# Patient Record
Sex: FEMALE | Race: WHITE | Employment: OTHER | URBAN - METROPOLITAN AREA
[De-identification: names, ages, dates, MRNs, and addresses within clinical notes are randomized per-mention and may not be internally consistent; named-entity substitution may affect disease eponyms.]

---

## 2020-03-05 LAB
BASOPHILS ABSOLUTE: ABNORMAL
BASOPHILS ABSOLUTE: ABNORMAL
BASOPHILS RELATIVE PERCENT: ABNORMAL
BASOPHILS RELATIVE PERCENT: ABNORMAL
EOSINOPHILS ABSOLUTE: ABNORMAL
EOSINOPHILS ABSOLUTE: ABNORMAL
EOSINOPHILS RELATIVE PERCENT: ABNORMAL
EOSINOPHILS RELATIVE PERCENT: ABNORMAL
HCT VFR BLD CALC: 28.6 % (ref 36–46)
HCT VFR BLD CALC: 28.6 % (ref 36–46)
HEMOGLOBIN: 8.4 G/DL (ref 12–16)
HEMOGLOBIN: 8.4 G/DL (ref 12–16)
LYMPHOCYTES ABSOLUTE: ABNORMAL
LYMPHOCYTES ABSOLUTE: ABNORMAL
LYMPHOCYTES RELATIVE PERCENT: ABNORMAL
LYMPHOCYTES RELATIVE PERCENT: ABNORMAL
MCH RBC QN AUTO: ABNORMAL PG
MCH RBC QN AUTO: ABNORMAL PG
MCHC RBC AUTO-ENTMCNC: 29.4 G/DL
MCHC RBC AUTO-ENTMCNC: 29.4 G/DL
MCV RBC AUTO: 78 FL
MCV RBC AUTO: 78 FL
MONOCYTES ABSOLUTE: ABNORMAL
MONOCYTES ABSOLUTE: ABNORMAL
MONOCYTES RELATIVE PERCENT: ABNORMAL
MONOCYTES RELATIVE PERCENT: ABNORMAL
NEUTROPHILS ABSOLUTE: ABNORMAL
NEUTROPHILS ABSOLUTE: ABNORMAL
NEUTROPHILS RELATIVE PERCENT: ABNORMAL
NEUTROPHILS RELATIVE PERCENT: ABNORMAL
PLATELET # BLD: 357 K/ΜL
PLATELET # BLD: 357 K/ΜL
PMV BLD AUTO: ABNORMAL FL
PMV BLD AUTO: ABNORMAL FL
RBC # BLD: 3.65 10^6/ΜL
RBC # BLD: ABNORMAL 10*6/UL
WBC # BLD: 6.1 10^3/ML
WBC # BLD: 8.1 10^3/ML

## 2020-08-03 LAB
ALBUMIN SERPL-MCNC: 3.5 G/DL
ALP BLD-CCNC: 66 U/L
ALT SERPL-CCNC: 9 U/L
ANION GAP SERPL CALCULATED.3IONS-SCNC: 12 MMOL/L
AST SERPL-CCNC: 18 U/L
BILIRUB SERPL-MCNC: 0.4 MG/DL (ref 0.1–1.4)
BUN BLDV-MCNC: 10 MG/DL
CALCIUM SERPL-MCNC: 8.6 MG/DL
CHLORIDE BLD-SCNC: 102 MMOL/L
CO2: NORMAL
CREAT SERPL-MCNC: 0.37 MG/DL
FERRITIN: 10 NG/ML (ref 9–150)
GFR CALCULATED: NORMAL
GLUCOSE BLD-MCNC: 94 MG/DL
IRON: 11
POTASSIUM SERPL-SCNC: 4.3 MMOL/L
SEDIMENTATION RATE, ERYTHROCYTE: 46
SODIUM BLD-SCNC: 137 MMOL/L
TOTAL IRON BINDING CAPACITY: 379
TOTAL PROTEIN: 8.8

## 2020-09-28 ENCOUNTER — OFFICE VISIT (OUTPATIENT)
Dept: FAMILY MEDICINE CLINIC | Age: 65
End: 2020-09-28
Payer: MEDICARE

## 2020-09-28 VITALS
SYSTOLIC BLOOD PRESSURE: 90 MMHG | OXYGEN SATURATION: 96 % | HEART RATE: 74 BPM | DIASTOLIC BLOOD PRESSURE: 60 MMHG | TEMPERATURE: 98.1 F | HEIGHT: 63 IN

## 2020-09-28 PROBLEM — H04.123 DRY EYE SYNDROME, BILATERAL: Status: ACTIVE | Noted: 2020-07-23

## 2020-09-28 PROBLEM — G89.4 CHRONIC PAIN DISORDER: Status: ACTIVE | Noted: 2018-11-07

## 2020-09-28 PROBLEM — M81.0 AGE-RELATED OSTEOPOROSIS WITHOUT CURRENT PATHOLOGICAL FRACTURE: Status: ACTIVE | Noted: 2018-03-29

## 2020-09-28 PROBLEM — D64.9 ANEMIA, UNSPECIFIED: Status: ACTIVE | Noted: 2018-10-16

## 2020-09-28 PROBLEM — M05.79 RHEU ARTHRITIS W RHEU FACTOR MULT SITE W/O ORG/SYS INVOLV (HCC): Status: ACTIVE | Noted: 2018-11-07

## 2020-09-28 PROBLEM — E03.9 HYPOTHYROIDISM, UNSPECIFIED: Status: ACTIVE | Noted: 2018-10-16

## 2020-09-28 PROCEDURE — 1036F TOBACCO NON-USER: CPT | Performed by: FAMILY MEDICINE

## 2020-09-28 PROCEDURE — 1090F PRES/ABSN URINE INCON ASSESS: CPT | Performed by: FAMILY MEDICINE

## 2020-09-28 PROCEDURE — 3017F COLORECTAL CA SCREEN DOC REV: CPT | Performed by: FAMILY MEDICINE

## 2020-09-28 PROCEDURE — G8421 BMI NOT CALCULATED: HCPCS | Performed by: FAMILY MEDICINE

## 2020-09-28 PROCEDURE — 1123F ACP DISCUSS/DSCN MKR DOCD: CPT | Performed by: FAMILY MEDICINE

## 2020-09-28 PROCEDURE — 99203 OFFICE O/P NEW LOW 30 MIN: CPT | Performed by: FAMILY MEDICINE

## 2020-09-28 PROCEDURE — 4040F PNEUMOC VAC/ADMIN/RCVD: CPT | Performed by: FAMILY MEDICINE

## 2020-09-28 PROCEDURE — G8400 PT W/DXA NO RESULTS DOC: HCPCS | Performed by: FAMILY MEDICINE

## 2020-09-28 PROCEDURE — G8427 DOCREV CUR MEDS BY ELIG CLIN: HCPCS | Performed by: FAMILY MEDICINE

## 2020-09-28 RX ORDER — LIFITEGRAST 50 MG/ML
1 SOLUTION/ DROPS OPHTHALMIC 2 TIMES DAILY
COMMUNITY
Start: 2020-07-23

## 2020-09-28 RX ORDER — FOLIC ACID 1 MG/1
TABLET ORAL
COMMUNITY
Start: 2020-07-05

## 2020-09-28 RX ORDER — PREDNISONE 1 MG/1
5 TABLET ORAL DAILY
COMMUNITY

## 2020-09-28 RX ORDER — LEVOTHYROXINE SODIUM 0.1 MG/1
TABLET ORAL
COMMUNITY
Start: 2020-07-07

## 2020-09-28 RX ORDER — OXYCODONE AND ACETAMINOPHEN 7.5; 325 MG/1; MG/1
1 TABLET ORAL
COMMUNITY
Start: 2020-09-04 | End: 2021-11-16 | Stop reason: SDUPTHER

## 2020-09-28 RX ORDER — ZOLEDRONIC ACID 5 MG/100ML
5 INJECTION, SOLUTION INTRAVENOUS ONCE
COMMUNITY

## 2020-09-28 RX ORDER — NAPROXEN 500 MG/1
TABLET ORAL
Status: ON HOLD | COMMUNITY
Start: 2020-06-23 | End: 2021-10-04 | Stop reason: HOSPADM

## 2020-09-28 SDOH — HEALTH STABILITY: MENTAL HEALTH: HOW OFTEN DO YOU HAVE A DRINK CONTAINING ALCOHOL?: NEVER

## 2020-09-28 ASSESSMENT — PATIENT HEALTH QUESTIONNAIRE - PHQ9
SUM OF ALL RESPONSES TO PHQ QUESTIONS 1-9: 0
SUM OF ALL RESPONSES TO PHQ9 QUESTIONS 1 & 2: 0
1. LITTLE INTEREST OR PLEASURE IN DOING THINGS: 0
2. FEELING DOWN, DEPRESSED OR HOPELESS: 0
SUM OF ALL RESPONSES TO PHQ QUESTIONS 1-9: 0

## 2020-09-28 ASSESSMENT — ENCOUNTER SYMPTOMS
COUGH: 0
CONSTIPATION: 0
SHORTNESS OF BREATH: 0
BLOOD IN STOOL: 0
CHEST TIGHTNESS: 0
APNEA: 0
NAUSEA: 0
DIARRHEA: 0
VOMITING: 0
ABDOMINAL PAIN: 0

## 2020-09-28 NOTE — PROGRESS NOTES
Subjective:      Patient ID: Amena Petit is a 76 y.o. female who presents today for:     Chief Complaint   Patient presents with    New Patient     New patient presents to establish care, previous PCP Dr. Roger Sauer, still sees him for rheumatology.  Health Maintenance     Patient states she had a mammogram done at [facility] about 1 year ago, refuses colonoscopy. HPI  Patient is a 50-year-old female with a history of severe rheumatoid arthritis and hypothyroidism and anemia that is been in the care of rheumatology without having a PCP. She recently suffered from a bout of gastroenteritis after eating an egg sandwich at [facility] about 3 weeks ago. Both she and her  became ill with nausea, vomiting and diarrhea which lasted for approximately 2 days. Since that time patient has had weakness and fatigue and has not returned to her baseline. She initially was not able to eat or drink during the acute phase of the illness, and supplemented with Pedialyte and Gatorade which provoked improvement. she is slowly increased her diet and has been craving [employer] man turkey dinners\". At baseline she is typically able to ambulate around the house with her walker but does need assistance from her . Since this illness, she is required much more assistance and takes longer more frequent naps during the day. She has difficulty toileting and has needed assistance. She states that she has had approximately 30% improvement over the past 3 weeks and has not reached her baseline. She denies any fever, chills, abdominal pain, nausea, vomiting, diarrhea, shortness of breath, cough. Her main complaint is weakness and fatigue    Anemia: Patient receives iron infusions and is followed by rheumatology.   We do not have current access to the baseline labs      Past Medical History:   Diagnosis Date    Fibromyalgia     Fracture of pelvis ([facility]     left    History of left hip replacement     History of total left knee replacement     History of total right knee replacement     Light sensitivity     bilateral eye    Osteoporosis     Rheumatoid arthritis ([address]     Sjogrens syndrome ([address]      Past Surgical History:   Procedure Laterality Date    CATARACT REMOVAL WITH IMPLANT  01/2013    bilateral    CHOLECYSTECTOMY      OTHER SURGICAL HISTORY      bilateral eye implant    OVARY REMOVAL  06/2000    right    REVISION TOTAL HIP ARTHROPLASTY  10/2012    left    TOTAL HIP ARTHROPLASTY  04/1990    left    TOTAL KNEE ARTHROPLASTY  08/1997    right and left     History reviewed. No pertinent family history.   Social History     Socioeconomic History    Marital status:      Spouse name: Not on file    Number of children: Not on file    Years of education: Not on file    Highest education level: Not on file   Occupational History    Not on file   Social Needs    Financial resource strain: Not on file    Food insecurity     Worry: Not on file     Inability: Not on file    Transportation needs     Medical: Not on file     Non-medical: Not on file   Tobacco Use    Smoking status: Never Smoker    Smokeless tobacco: Never Used   Substance and Sexual Activity    Alcohol use: Never     Frequency: Never    Drug use: Never    Sexual activity: Not Currently   Lifestyle    Physical activity     Days per week: Not on file     Minutes per session: Not on file    Stress: Not on file   Relationships    Social connections     Talks on phone: Not on file     Gets together: Not on file     Attends Judaism service: Not on file     Active member of club or organization: Not on file     Attends meetings of clubs or organizations: Not on file     Relationship status: Not on file    Intimate partner violence     Fear of current or ex partner: Not on file     Emotionally abused: Not on file     Physically abused: Not on file     Forced sexual activity: Not on file   Other Topics Concern    Not on file   Social History Narrative    Not on file     Current Outpatient Medications on File Prior to Visit   Medication Sig Dispense Refill    etanercept (ENBREL) 50 MG/ML injection Inject into the skin once a week      folic acid (FOLVITE) 1 MG tablet TAKE 1 TABLET BY MOUTH ONCE DAILY      predniSONE (DELTASONE) 5 MG tablet Take 5 mg by mouth daily      Calcium Carbonate-Vit D-Min (CALTRATE 600+D PLUS PO) Take by mouth daily      levothyroxine (SYNTHROID) 100 MCG tablet TAKE 1 TABLET BY MOUTH ONCE DAILY      Lifitegrast (XIIDRA) 5 % SOLN Apply 1 drop to eye 2 times daily      oxyCODONE-acetaminophen (PERCOCET) 7.5-325 MG per tablet TAKE 1 2 (ONE HALF) TABLET BY MOUTH EVERY 4 HOURS AS NEEDED AND 1 AT BEDTIME AS NEEDED AS DIRECTED      naproxen (NAPROSYN) 500 MG tablet TAKE 1 TABLET BY MOUTH TWICE DAILY AS NEEDED      zoledronic acid (RECLAST) 5 MG/100ML SOLN Infuse 5 mg intravenously once      Multiple Vitamins-Minerals (CENTRUM ADULTS PO) Take by mouth      Carboxymethylcellul-Glycerin (REFRESH RELIEVA OP) Apply to eye      Pregabalin (LYRICA PO) Take 7 mLs by mouth nightly      iron sucrose (VENOFER) 20 MG/ML injection Infuse 100 mg intravenously Once in dialysis       No current facility-administered medications on file prior to visit. Allergies:  Hydrocodone-acetaminophen; Hydroxychloroquine; Indomethacin; Amoxicillin; Lactose; and Other    Review of Systems   Constitutional: Positive for appetite change and fatigue. Negative for activity change, fever and unexpected weight change. Respiratory: Negative for apnea, cough, chest tightness and shortness of breath. Cardiovascular: Negative for chest pain, palpitations and leg swelling. Gastrointestinal: Negative for abdominal pain, blood in stool, constipation, diarrhea, nausea and vomiting. Musculoskeletal: Positive for arthralgias (Chronic), back pain, gait problem and myalgias. Negative for joint swelling, neck pain and neck stiffness.    Neurological: Positive for weakness (Generalized). Negative for seizures and headaches. Psychiatric/Behavioral: Negative for hallucinations and suicidal ideas. Objective:   BP 90/60 (Site: Right Upper Arm, Position: Sitting, Cuff Size: Small Adult)   Pulse 74   Temp 98.1 °F (36.7 °C) (Temporal)   Ht 5' 3\" (1.6 m)   SpO2 96%     Physical Exam  Vitals signs and nursing note reviewed. Constitutional:       General: She is not in acute distress. Appearance: Normal appearance. She is well-developed. She is not diaphoretic. Comments: Underweight  In wheelchair   HENT:      Head: Normocephalic and atraumatic. Right Ear: Tympanic membrane, ear canal and external ear normal. There is no impacted cerumen. Left Ear: Tympanic membrane, ear canal and external ear normal. There is no impacted cerumen. Nose: Nose normal.      Mouth/Throat:      Mouth: Mucous membranes are moist.      Pharynx: Oropharynx is clear. No oropharyngeal exudate or posterior oropharyngeal erythema. Eyes:      Extraocular Movements: Extraocular movements intact. Conjunctiva/sclera: Conjunctivae normal.      Pupils: Pupils are equal, round, and reactive to light. Neck:      Musculoskeletal: Normal range of motion and neck supple. Cardiovascular:      Rate and Rhythm: Normal rate and regular rhythm. Pulses: Normal pulses. Heart sounds: Normal heart sounds. No murmur. No friction rub. No gallop. Pulmonary:      Effort: Pulmonary effort is normal. No respiratory distress. Breath sounds: Normal breath sounds. No wheezing or rales. Chest:      Chest wall: No tenderness. Abdominal:      General: Abdomen is flat. Bowel sounds are normal.      Palpations: Abdomen is soft. Tenderness: There is no abdominal tenderness. Musculoskeletal:      Comments: Contractures of bilateral hands and fingers  Inward rotated right foot/ankle contracture   Skin:     General: Skin is warm and dry.    Neurological: Mental Status: She is alert and oriented to person, place, and time. Psychiatric:         Mood and Affect: Mood normal.         Behavior: Behavior normal.         Thought Content: Thought content normal.         Judgment: Judgment normal.         Assessment & Plan:     1. Acute gastroenteritis  Resolved symptoms although still profound weakness  - CBC Auto Differential; Future  - Comprehensive Metabolic Panel; Future    2. Physical deconditioning  We will continue to monitor but patient may need formalized physical therapy and nutritional supplementation   - Comprehensive Metabolic Panel; Future    3. Hypothyroidism, unspecified type  We will check current TSH levels  - TSH with Reflex; Future      Return in about 1 month (around 10/28/2020), or if symptoms worsen or fail to improve.     Louis Sutton MD

## 2020-09-29 ENCOUNTER — HOSPITAL ENCOUNTER (OUTPATIENT)
Dept: LAB | Age: 65
Discharge: HOME | End: 2020-09-29
Payer: MEDICARE

## 2020-09-29 LAB
ALBUMIN SERPL-MCNC: 3 G/DL (ref 3.5–4.6)
ALP BLD-CCNC: 63 U/L (ref 40–130)
ALT SERPL-CCNC: 10 U/L (ref 0–33)
ANION GAP SERPL CALCULATED.3IONS-SCNC: 9 MEQ/L (ref 9–15)
AST SERPL-CCNC: 23 U/L (ref 0–35)
BASOPHILS ABSOLUTE: 0.1 K/UL (ref 0–0.2)
BASOPHILS RELATIVE PERCENT: 1.4 %
BILIRUB SERPL-MCNC: 0.3 MG/DL (ref 0.2–0.7)
BUN BLDV-MCNC: 7 MG/DL (ref 8–23)
CALCIUM SERPL-MCNC: 8.5 MG/DL (ref 8.5–9.9)
CHLORIDE BLD-SCNC: 104 MEQ/L (ref 95–107)
CO2: 25 MEQ/L (ref 20–31)
CREAT SERPL-MCNC: 0.36 MG/DL (ref 0.5–0.9)
EOSINOPHILS ABSOLUTE: 0 K/UL (ref 0–0.7)
EOSINOPHILS RELATIVE PERCENT: 0.7 %
GFR AFRICAN AMERICAN: >60
GFR NON-AFRICAN AMERICAN: >60
GLOBULIN: 3 G/DL (ref 2.3–3.5)
GLUCOSE BLD-MCNC: 101 MG/DL (ref 70–99)
HCT VFR BLD CALC: 33.8 % (ref 37–47)
HEMOGLOBIN: 10.3 G/DL (ref 12–16)
LYMPHOCYTES ABSOLUTE: 0.9 K/UL (ref 1–4.8)
LYMPHOCYTES RELATIVE PERCENT: 22.1 %
MCH RBC QN AUTO: 25.1 PG (ref 27–31.3)
MCHC RBC AUTO-ENTMCNC: 30.5 % (ref 33–37)
MCV RBC AUTO: 82.4 FL (ref 82–100)
MONOCYTES ABSOLUTE: 0.5 K/UL (ref 0.2–0.8)
MONOCYTES RELATIVE PERCENT: 12.6 %
NEUTROPHILS ABSOLUTE: 2.6 K/UL (ref 1.4–6.5)
NEUTROPHILS RELATIVE PERCENT: 63.2 %
PDW BLD-RTO: 20.4 % (ref 11.5–14.5)
PLATELET # BLD: 343 K/UL (ref 130–400)
POTASSIUM SERPL-SCNC: 4.4 MEQ/L (ref 3.4–4.9)
RBC # BLD: 4.1 M/UL (ref 4.2–5.4)
SODIUM BLD-SCNC: 138 MEQ/L (ref 135–144)
TOTAL PROTEIN: 6 G/DL (ref 6.3–8)
TSH REFLEX: 0.56 UIU/ML (ref 0.44–3.86)
WBC # BLD: 4.1 K/UL (ref 4.8–10.8)

## 2020-09-29 PROCEDURE — 36415 COLL VENOUS BLD VENIPUNCTURE: CPT

## 2020-09-29 PROCEDURE — 84443 ASSAY THYROID STIM HORMONE: CPT

## 2020-09-29 PROCEDURE — 80053 COMPREHEN METABOLIC PANEL: CPT

## 2020-09-29 PROCEDURE — 85025 COMPLETE CBC W/AUTO DIFF WBC: CPT

## 2020-10-02 ASSESSMENT — ENCOUNTER SYMPTOMS: BACK PAIN: 1

## 2020-10-13 ENCOUNTER — TELEPHONE (OUTPATIENT)
Dept: FAMILY MEDICINE CLINIC | Age: 65
End: 2020-10-13

## 2020-10-13 NOTE — TELEPHONE ENCOUNTER
Patient is calling us to give update from [address] [place] [address]. She states she has been improving, her appetite is fine but she has not had a BM in a few weeks. She denied any abdominal pain or constipation. She stated that she has had bad experiences with OTC laxatives so she does not want to go that route.

## 2020-10-15 NOTE — TELEPHONE ENCOUNTER
Spoke to patient today. She is not uncomfortable, has an appetite and eating normally. She is passing gas and denies any nausea. When asked about OTC medications, she was referencing miralax that worked too well resulting in an accident because she was not able to make it to the restroom in time due to limited mobility. I advised that she use miralax again but start with 1/4 capful and titrate upwards.  If she is not having a bowel movement in the next 48 hours we will need to obtain imaging or if any of the redflag signs above occurs she will need to go to the ER

## 2021-06-23 ENCOUNTER — TELEPHONE (OUTPATIENT)
Dept: PRIMARY CARE CLINIC | Age: 66
End: 2021-06-23

## 2021-08-25 ENCOUNTER — TELEPHONE (OUTPATIENT)
Dept: FAMILY MEDICINE CLINIC | Age: 66
End: 2021-08-25

## 2021-09-22 ENCOUNTER — HOSPITAL ENCOUNTER (OUTPATIENT)
Dept: LAB | Age: 66
Discharge: HOME | End: 2021-09-22
Payer: MEDICARE

## 2021-09-22 ENCOUNTER — OFFICE VISIT (OUTPATIENT)
Dept: FAMILY MEDICINE CLINIC | Age: 66
End: 2021-09-22
Payer: MEDICARE

## 2021-09-22 VITALS
HEIGHT: 63 IN | TEMPERATURE: 99.3 F | DIASTOLIC BLOOD PRESSURE: 60 MMHG | BODY MASS INDEX: 15.06 KG/M2 | SYSTOLIC BLOOD PRESSURE: 104 MMHG | WEIGHT: 85 LBS | OXYGEN SATURATION: 97 % | HEART RATE: 73 BPM

## 2021-09-22 DIAGNOSIS — R19.7 DIARRHEA, UNSPECIFIED TYPE: ICD-10-CM

## 2021-09-22 DIAGNOSIS — D64.9 ANEMIA, UNSPECIFIED TYPE: ICD-10-CM

## 2021-09-22 DIAGNOSIS — E03.9 HYPOTHYROIDISM, UNSPECIFIED TYPE: ICD-10-CM

## 2021-09-22 DIAGNOSIS — G89.4 CHRONIC PAIN SYNDROME: Primary | ICD-10-CM

## 2021-09-22 DIAGNOSIS — M05.79 RHEU ARTHRITIS W RHEU FACTOR MULT SITE W/O ORG/SYS INVOLV (HCC): ICD-10-CM

## 2021-09-22 LAB
AMYLASE: 50 U/L (ref 22–93)
ANISOCYTOSIS: ABNORMAL
BASOPHILS ABSOLUTE: 0 K/UL (ref 0–0.2)
BASOPHILS RELATIVE PERCENT: 0.4 %
EOSINOPHILS ABSOLUTE: 0.1 K/UL (ref 0–0.7)
EOSINOPHILS RELATIVE PERCENT: 1 %
HCT VFR BLD CALC: 33.3 % (ref 37–47)
HEMOGLOBIN: 10.1 G/DL (ref 12–16)
HYPOCHROMIA: ABNORMAL
LACTIC ACID: 1.3 MMOL/L (ref 0.5–2.2)
LIPASE: 17 U/L (ref 12–95)
LYMPHOCYTES ABSOLUTE: 0.8 K/UL (ref 1–4.8)
LYMPHOCYTES RELATIVE PERCENT: 12 %
MAMMOGRAPHY, EXTERNAL: NORMAL
MCH RBC QN AUTO: 24.7 PG (ref 27–31.3)
MCHC RBC AUTO-ENTMCNC: 30.5 % (ref 33–37)
MCV RBC AUTO: 81.1 FL (ref 82–100)
METAMYELOCYTES RELATIVE PERCENT: 1 %
MONOCYTES ABSOLUTE: 0.6 K/UL (ref 0.2–0.8)
MONOCYTES RELATIVE PERCENT: 8.5 %
NEUTROPHILS ABSOLUTE: 5.1 K/UL (ref 1.4–6.5)
NEUTROPHILS RELATIVE PERCENT: 77 %
PDW BLD-RTO: 25.7 % (ref 11.5–14.5)
PLATELET # BLD: 641 K/UL (ref 130–400)
PLATELET SLIDE REVIEW: ABNORMAL
RBC # BLD: 4.1 M/UL (ref 4.2–5.4)
T4 FREE: 1.32 NG/DL (ref 0.84–1.68)
TSH SERPL DL<=0.05 MIU/L-ACNC: 8.11 UIU/ML (ref 0.44–3.86)
WBC # BLD: 6.6 K/UL (ref 4.8–10.8)

## 2021-09-22 PROCEDURE — 80053 COMPREHEN METABOLIC PANEL: CPT

## 2021-09-22 PROCEDURE — 1036F TOBACCO NON-USER: CPT | Performed by: FAMILY MEDICINE

## 2021-09-22 PROCEDURE — 1123F ACP DISCUSS/DSCN MKR DOCD: CPT | Performed by: FAMILY MEDICINE

## 2021-09-22 PROCEDURE — 84439 ASSAY OF FREE THYROXINE: CPT

## 2021-09-22 PROCEDURE — 85025 COMPLETE CBC W/AUTO DIFF WBC: CPT

## 2021-09-22 PROCEDURE — 1090F PRES/ABSN URINE INCON ASSESS: CPT | Performed by: FAMILY MEDICINE

## 2021-09-22 PROCEDURE — 36415 COLL VENOUS BLD VENIPUNCTURE: CPT

## 2021-09-22 PROCEDURE — G8427 DOCREV CUR MEDS BY ELIG CLIN: HCPCS | Performed by: FAMILY MEDICINE

## 2021-09-22 PROCEDURE — G8400 PT W/DXA NO RESULTS DOC: HCPCS | Performed by: FAMILY MEDICINE

## 2021-09-22 PROCEDURE — 4040F PNEUMOC VAC/ADMIN/RCVD: CPT | Performed by: FAMILY MEDICINE

## 2021-09-22 PROCEDURE — 3017F COLORECTAL CA SCREEN DOC REV: CPT | Performed by: FAMILY MEDICINE

## 2021-09-22 PROCEDURE — 82150 ASSAY OF AMYLASE: CPT

## 2021-09-22 PROCEDURE — 84443 ASSAY THYROID STIM HORMONE: CPT

## 2021-09-22 PROCEDURE — G8419 CALC BMI OUT NRM PARAM NOF/U: HCPCS | Performed by: FAMILY MEDICINE

## 2021-09-22 PROCEDURE — 99214 OFFICE O/P EST MOD 30 MIN: CPT | Performed by: FAMILY MEDICINE

## 2021-09-22 PROCEDURE — 83605 ASSAY OF LACTIC ACID: CPT

## 2021-09-22 PROCEDURE — 83690 ASSAY OF LIPASE: CPT

## 2021-09-22 SDOH — ECONOMIC STABILITY: FOOD INSECURITY: WITHIN THE PAST 12 MONTHS, YOU WORRIED THAT YOUR FOOD WOULD RUN OUT BEFORE YOU GOT MONEY TO BUY MORE.: NEVER TRUE

## 2021-09-22 SDOH — ECONOMIC STABILITY: FOOD INSECURITY: WITHIN THE PAST 12 MONTHS, THE FOOD YOU BOUGHT JUST DIDN'T LAST AND YOU DIDN'T HAVE MONEY TO GET MORE.: NEVER TRUE

## 2021-09-22 ASSESSMENT — PATIENT HEALTH QUESTIONNAIRE - PHQ9
2. FEELING DOWN, DEPRESSED OR HOPELESS: 1
SUM OF ALL RESPONSES TO PHQ QUESTIONS 1-9: 2
SUM OF ALL RESPONSES TO PHQ QUESTIONS 1-9: 2
1. LITTLE INTEREST OR PLEASURE IN DOING THINGS: 1
SUM OF ALL RESPONSES TO PHQ9 QUESTIONS 1 & 2: 2
SUM OF ALL RESPONSES TO PHQ QUESTIONS 1-9: 2

## 2021-09-22 ASSESSMENT — ENCOUNTER SYMPTOMS
CHEST TIGHTNESS: 0
NAUSEA: 0
VOMITING: 0
BLOOD IN STOOL: 0
ABDOMINAL DISTENTION: 0
COUGH: 0
ABDOMINAL PAIN: 0
APNEA: 0
RECTAL PAIN: 0
DIARRHEA: 1
SHORTNESS OF BREATH: 0
CONSTIPATION: 0
ANAL BLEEDING: 0

## 2021-09-22 ASSESSMENT — SOCIAL DETERMINANTS OF HEALTH (SDOH): HOW HARD IS IT FOR YOU TO PAY FOR THE VERY BASICS LIKE FOOD, HOUSING, MEDICAL CARE, AND HEATING?: NOT HARD AT ALL

## 2021-09-22 NOTE — PROGRESS NOTES
Subjective:      Patient ID: Jack Moscoso is a 78 y.o. female who presents today for:     Chief Complaint   Patient presents with    Other     follow up, still fatigue, no diarrhea since sunday, no appetite x3.5 weeks        HPI  Patient is a very pleasant 71-year-old female with a history of severe rheumatoid arthritis who presents today to follow-up. She states that she was relatively well until approximately 3 weeks ago when she began to have diarrhea. She states that she would have 2-3 loose stools per day and had decreased appetite. She denies any fever, chills, abdominal pain, nausea or vomiting. She states due to her lack of intake, she has had significant fatigue. She has been utilizing Pedialyte to help replace her electrolytes which she states has helped with her overall energy level. Most recently during this past week, she has 3 days of diarrhea followed by 2 and half days of resolved symptoms followed by 1 day of severe diarrhea but she has not had a bowel movement in the past 3 days. She denies any fullness, cramping or pain. She has been able to tolerate a soft and clear liquid diet. She did have mashed potatoes yesterday and some cereal this morning and tolerated it well. Additionally patient states that her rheumatoid arthritis is quite severe. This is caused significant chronic disability. Patient is not able to perform her normal activities of daily living secondary to her pain. She is currently managed with Percocet and Lyrica by her rheumatologist.  She states that she has exhausted all DMARD options. She states that the degree of pain is at times intolerable. Her pain level has been affecting her mood and her motivation. She at times feels as though she is a burden to her  and she struggles to stay motivated. She does admit to increased anxiety about her overall situation in anticipation of pain. She admits to a depressed mood as well.   She is open to trying medication but does not want to consider counseling at this time. She has an excellent support system of both her  and her children. Patient also has a long history of anemia and has been receiving iron infusions coordinated by rheumatology. She states the most recent labs obtained to [facility] rheumatologist show a hemoglobin level of 8.2. Last recorded hemoglobin within our system was 10.3. She was supposed to have repeat testing, but was unable to do so. Past Medical History:   Diagnosis Date    Fibromyalgia     Fracture of pelvis ([address]     left    History of left hip replacement     History of total left knee replacement     History of total right knee replacement     Light sensitivity     bilateral eye    Osteoporosis     Rheumatoid arthritis ([facility] [address]     Sjogrens syndrome ([address]      Past Surgical History:   Procedure Laterality Date    CATARACT REMOVAL WITH IMPLANT  01/2013    bilateral    CHOLECYSTECTOMY      OTHER SURGICAL HISTORY      bilateral eye implant    OVARY REMOVAL  06/2000    right    REVISION TOTAL HIP ARTHROPLASTY  10/2012    left    TOTAL HIP ARTHROPLASTY  04/1990    left    TOTAL KNEE ARTHROPLASTY  08/1997    right and left     History reviewed. No pertinent family history.   Social History     Socioeconomic History    Marital status:      Spouse name: Not on file    Number of children: Not on file    Years of education: Not on file    Highest education level: Not on file   Occupational History    Not on file   Tobacco Use    Smoking status: Never Smoker    Smokeless tobacco: Never Used   Vaping Use    Vaping Use: Never used   Substance and Sexual Activity    Alcohol use: Never    Drug use: Never    Sexual activity: Not Currently   Other Topics Concern    Not on file   Social History Narrative    Not on file     Social Determinants of Health     Financial Resource Strain: Low Risk     Difficulty of Paying Living Expenses: Not hard at all Food Insecurity: No Food Insecurity    Worried About Running Out of Food in the Last Year: Never true    [place] of Food in the Last Year: Never true   Transportation Needs:     Lack of Transportation (Medical):      Lack of Transportation (Non-Medical):    Physical Activity:     Days of Exercise per Week:     Minutes of Exercise per Session:    Stress:     Feeling of Stress :    Social Connections:     Frequency of Communication with Friends and Family:     Frequency of Social Gatherings with Friends and Family:     Attends Mandaeism Services:     Active Member of Clubs or Organizations:     Attends Club or Organization Meetings:     Marital Status:    Intimate Partner Violence:     Fear of Current or Ex-Partner:     Emotionally Abused:     Physically Abused:     Sexually Abused:      Current Outpatient Medications on File Prior to Visit   Medication Sig Dispense Refill    folic acid (FOLVITE) 1 MG tablet TAKE 1 TABLET BY MOUTH ONCE DAILY      predniSONE (DELTASONE) 5 MG tablet Take 5 mg by mouth daily      Calcium Carbonate-Vit D-Min (CALTRATE 600+D PLUS PO) Take by mouth daily      levothyroxine (SYNTHROID) 100 MCG tablet TAKE 1 TABLET BY MOUTH ONCE DAILY      Lifitegrast (XIIDRA) 5 % SOLN Apply 1 drop to eye 2 times daily      oxyCODONE-acetaminophen (PERCOCET) 7.5-325 MG per tablet TAKE 1 2 (ONE HALF) TABLET BY MOUTH EVERY 4 HOURS AS NEEDED AND 1 AT BEDTIME AS NEEDED AS DIRECTED      naproxen (NAPROSYN) 500 MG tablet TAKE 1 TABLET BY MOUTH TWICE DAILY AS NEEDED      zoledronic acid (RECLAST) 5 MG/100ML SOLN Infuse 5 mg intravenously once      Multiple Vitamins-Minerals (CENTRUM ADULTS PO) Take by mouth      Carboxymethylcellul-Glycerin (REFRESH RELIEVA OP) Apply to eye      Pregabalin (LYRICA PO) Take 7 mLs by mouth nightly      etanercept (ENBREL) 50 MG/ML injection Inject into the skin once a week (Patient not taking: Reported on 9/22/2021)      iron sucrose (VENOFER) 20 MG/ML injection Infuse 100 mg intravenously Once in dialysis (Patient not taking: Reported on 9/22/2021)       No current facility-administered medications on file prior to visit. Allergies:  Hydrocodone-acetaminophen, Hydroxychloroquine, Indomethacin, Amoxicillin, Lactose, and Other    Review of Systems   Constitutional: Positive for activity change and appetite change. Negative for chills, diaphoresis, fatigue, fever and unexpected weight change. Respiratory: Negative for apnea, cough, chest tightness and shortness of breath. Cardiovascular: Negative for chest pain, palpitations and leg swelling. Gastrointestinal: Positive for diarrhea. Negative for abdominal distention, abdominal pain, anal bleeding, blood in stool, constipation, nausea, rectal pain and vomiting. Musculoskeletal: Negative for arthralgias. Neurological: Negative for seizures and headaches. Psychiatric/Behavioral: Negative for hallucinations and suicidal ideas. Objective:   /60   Pulse 73   Temp 99.3 °F (37.4 °C) (Infrared)   Ht 5' 3\" (1.6 m)   Wt 85 lb (38.6 kg) Comment: pt reported  SpO2 97%   BMI 15.06 kg/m²     Physical Exam  Vitals and nursing note reviewed. Constitutional:       General: She is not in acute distress. Appearance: Normal appearance. She is well-developed. She is not diaphoretic. Comments: Underweight  In wheelchair   HENT:      Head: Normocephalic and atraumatic. Right Ear: Tympanic membrane, ear canal and external ear normal. There is no impacted cerumen. Left Ear: Tympanic membrane, ear canal and external ear normal. There is no impacted cerumen. Nose: Nose normal.      Mouth/Throat:      Mouth: Mucous membranes are moist.      Pharynx: Oropharynx is clear. No oropharyngeal exudate or posterior oropharyngeal erythema. Eyes:      Extraocular Movements: Extraocular movements intact.       Conjunctiva/sclera: Conjunctivae normal.      Pupils: Pupils are equal, round, and reactive to light. Cardiovascular:      Rate and Rhythm: Normal rate and regular rhythm. Pulses: Normal pulses. Heart sounds: Normal heart sounds. No murmur heard. No friction rub. No gallop. Pulmonary:      Effort: Pulmonary effort is normal. No respiratory distress. Breath sounds: Normal breath sounds. No wheezing or rales. Chest:      Chest wall: No tenderness. Abdominal:      General: Abdomen is flat. Bowel sounds are normal. There is no distension. Palpations: Abdomen is soft. There is no mass. Tenderness: There is no abdominal tenderness. Hernia: No hernia is present. Musculoskeletal:      Cervical back: Normal range of motion and neck supple. Comments: Contractures of bilateral hands and fingers  Inward rotated right foot/ankle contracture   Skin:     General: Skin is warm and dry. Neurological:      Mental Status: She is alert and oriented to person, place, and time. Psychiatric:         Mood and Affect: Mood normal.         Behavior: Behavior normal.         Thought Content: Thought content normal.         Judgment: Judgment normal.         Assessment & Plan:     1. Chronic pain syndrome  We will refer to pain management to assist in new medication or supplementation of existing regimen    2. Rheu arthritis w rheu factor mult site w/o org/sys involv ([facility]  Continue to follow-up with rheumatology    3. Diarrhea, unspecified type  Unclear etiology at this time. We will check current lab results. As this has been a prolonged episode and recurrent believe patient would benefit from seeing gastroenterology. We will also arrange for imaging if symptoms do not improve  - Comprehensive Metabolic Panel; Future  - O&P Panel ([employer]) #1; Future  - Gastrointestinal Panel by DNA; Future  - Clostridium Difficile Toxin/Antigen; Future  - Ambulatory referral to Gastroenterology  - Lipase; Future  - Amylase; Future    4.  Anemia, unspecified type   check current results and strongly consider hematology consult  - CBC Auto Differential; Future    5. Hypothyroidism, unspecified type  We will check current thyroid levels and titrate medication accordingly  - TSH without Reflex; Future  - T4, Free; Future    6. Depression and anxiety:  Will strongly consider an SSRI or potentially Cymbalta but will await GI symptoms to resolve    Return if symptoms worsen or fail to improve.     Chris Guan MD

## 2021-09-23 DIAGNOSIS — D64.9 CHRONIC ANEMIA: ICD-10-CM

## 2021-09-23 DIAGNOSIS — D75.839 THROMBOCYTOSIS: Primary | ICD-10-CM

## 2021-09-23 LAB
ALBUMIN SERPL-MCNC: 3 G/DL (ref 3.5–4.6)
ALP BLD-CCNC: 70 U/L (ref 40–130)
ALT SERPL-CCNC: 7 U/L (ref 0–33)
ANION GAP SERPL CALCULATED.3IONS-SCNC: 19 MEQ/L (ref 9–15)
AST SERPL-CCNC: 23 U/L (ref 0–35)
BILIRUB SERPL-MCNC: <0.2 MG/DL (ref 0.2–0.7)
BUN BLDV-MCNC: 11 MG/DL (ref 8–23)
CALCIUM SERPL-MCNC: 8.7 MG/DL (ref 8.5–9.9)
CHLORIDE BLD-SCNC: 92 MEQ/L (ref 95–107)
CO2: 21 MEQ/L (ref 20–31)
CREAT SERPL-MCNC: 0.41 MG/DL (ref 0.5–0.9)
GFR AFRICAN AMERICAN: >60
GFR NON-AFRICAN AMERICAN: >60
GLOBULIN: 2.9 G/DL (ref 2.3–3.5)
GLUCOSE BLD-MCNC: 99 MG/DL (ref 70–99)
POTASSIUM SERPL-SCNC: 4.7 MEQ/L (ref 3.4–4.9)
SODIUM BLD-SCNC: 132 MEQ/L (ref 135–144)
TOTAL PROTEIN: 5.9 G/DL (ref 6.3–8)

## 2021-09-24 ENCOUNTER — TELEPHONE (OUTPATIENT)
Dept: FAMILY MEDICINE CLINIC | Age: 66
End: 2021-09-24

## 2021-09-24 NOTE — TELEPHONE ENCOUNTER
A referral was placed at the time of appointment.   Please make sure that the referral was faxed so that the patient may schedule

## 2021-09-24 NOTE — TELEPHONE ENCOUNTER
----- Message from [employer] sent at 9/24/2021  2:33 PM EDT -----  Subject: Message to Provider    QUESTIONS  Information for Provider? pt. states that they could not schedule an appt. with Dr. Elena Barnett because Dr. Nataliya Gonzalez needs to complete paper work   and fax it to Dr. Janay Tucker office. ---------------------------------------------------------------------------  --------------  Giselle Amanda INFO  What is the best way for the office to contact you? OK to leave message on   voicemail  Preferred Call Back Phone Number? [phone]  ---------------------------------------------------------------------------  --------------  SCRIPT ANSWERS  Relationship to Patient?  Self

## 2021-10-01 ENCOUNTER — APPOINTMENT (OUTPATIENT)
Dept: CT IMAGING | Age: 66
DRG: 391 | End: 2021-10-01
Payer: MEDICARE

## 2021-10-01 ENCOUNTER — TELEPHONE (OUTPATIENT)
Dept: FAMILY MEDICINE CLINIC | Age: 66
End: 2021-10-01

## 2021-10-01 ENCOUNTER — HOSPITAL ENCOUNTER (INPATIENT)
Age: 66
LOS: 3 days | Discharge: HOME | DRG: 391 | End: 2021-10-04
Attending: EMERGENCY MEDICINE | Admitting: INTERNAL MEDICINE
Payer: MEDICARE

## 2021-10-01 DIAGNOSIS — R11.2 NON-INTRACTABLE VOMITING WITH NAUSEA, UNSPECIFIED VOMITING TYPE: ICD-10-CM

## 2021-10-01 DIAGNOSIS — R19.7 DIARRHEA, UNSPECIFIED TYPE: ICD-10-CM

## 2021-10-01 DIAGNOSIS — M79.7 FIBROMYALGIA, SECONDARY: ICD-10-CM

## 2021-10-01 DIAGNOSIS — G89.29 OTHER CHRONIC PAIN: ICD-10-CM

## 2021-10-01 DIAGNOSIS — E87.20 LACTIC ACIDOSIS: ICD-10-CM

## 2021-10-01 DIAGNOSIS — M05.79 RHEU ARTHRITIS W RHEU FACTOR MULT SITE W/O ORG/SYS INVOLV (HCC): ICD-10-CM

## 2021-10-01 DIAGNOSIS — K52.9 COLITIS: Primary | ICD-10-CM

## 2021-10-01 LAB
ALBUMIN SERPL-MCNC: 3.1 G/DL (ref 3.5–4.6)
ALP BLD-CCNC: 57 U/L (ref 40–130)
ALT SERPL-CCNC: <5 U/L (ref 0–33)
ANION GAP SERPL CALCULATED.3IONS-SCNC: 17 MEQ/L (ref 9–15)
ANISOCYTOSIS: ABNORMAL
AST SERPL-CCNC: 29 U/L (ref 0–35)
BANDED NEUTROPHILS RELATIVE PERCENT: 2 % (ref 5–11)
BASOPHILS ABSOLUTE: 0.1 K/UL (ref 0–0.2)
BASOPHILS RELATIVE PERCENT: 1 %
BILIRUB SERPL-MCNC: 0.3 MG/DL (ref 0.2–0.7)
BILIRUBIN URINE: NEGATIVE
BLOOD, URINE: NEGATIVE
BUN BLDV-MCNC: 8 MG/DL (ref 8–23)
CALCIUM SERPL-MCNC: 8.2 MG/DL (ref 8.5–9.9)
CHLORIDE BLD-SCNC: 98 MEQ/L (ref 95–107)
CLARITY: CLEAR
CO2: 20 MEQ/L (ref 20–31)
COLOR: YELLOW
CREAT SERPL-MCNC: 0.33 MG/DL (ref 0.5–0.9)
EOSINOPHILS ABSOLUTE: 0 K/UL (ref 0–0.7)
EOSINOPHILS RELATIVE PERCENT: 0.3 %
GFR AFRICAN AMERICAN: >60
GFR AFRICAN AMERICAN: >60
GFR NON-AFRICAN AMERICAN: >60
GFR NON-AFRICAN AMERICAN: >60
GLOBULIN: 2.9 G/DL (ref 2.3–3.5)
GLUCOSE BLD-MCNC: 80 MG/DL (ref 70–99)
GLUCOSE URINE: NEGATIVE MG/DL
HCT VFR BLD CALC: 35.4 % (ref 37–47)
HEMOGLOBIN: 10.9 G/DL (ref 12–16)
HYPOCHROMIA: ABNORMAL
KETONES, URINE: 15 MG/DL
LACTIC ACID: 2.6 MMOL/L (ref 0.5–2.2)
LACTIC ACID: 2.7 MMOL/L (ref 0.5–2.2)
LEUKOCYTE ESTERASE, URINE: NEGATIVE
LIPASE: 16 U/L (ref 12–95)
LYMPHOCYTES ABSOLUTE: 0.6 K/UL (ref 1–4.8)
LYMPHOCYTES RELATIVE PERCENT: 4 %
MACROCYTES: ABNORMAL
MAGNESIUM: 2.1 MG/DL (ref 1.7–2.4)
MCH RBC QN AUTO: 26.1 PG (ref 27–31.3)
MCHC RBC AUTO-ENTMCNC: 30.7 % (ref 33–37)
MCV RBC AUTO: 84.8 FL (ref 82–100)
MICROCYTES: ABNORMAL
MONOCYTES ABSOLUTE: 0.7 K/UL (ref 0.2–0.8)
MONOCYTES RELATIVE PERCENT: 4.7 %
NEUTROPHILS ABSOLUTE: 13.3 K/UL (ref 1.4–6.5)
NEUTROPHILS RELATIVE PERCENT: 89 %
NITRITE, URINE: NEGATIVE
PDW BLD-RTO: 27.8 % (ref 11.5–14.5)
PERFORMED ON: ABNORMAL
PH UA: 7 (ref 5–9)
PLATELET # BLD: 432 K/UL (ref 130–400)
PLATELET SLIDE REVIEW: ADEQUATE
POC CREATININE: 0.2 MG/DL (ref 0.6–1.2)
POC SAMPLE TYPE: ABNORMAL
POIKILOCYTES: ABNORMAL
POTASSIUM SERPL-SCNC: 4.1 MEQ/L (ref 3.4–4.9)
PREALBUMIN: 14.2 MG/DL (ref 20–40)
PROTEIN UA: NEGATIVE MG/DL
RBC # BLD: 4.18 M/UL (ref 4.2–5.4)
SODIUM BLD-SCNC: 135 MEQ/L (ref 135–144)
SPECIFIC GRAVITY UA: 1.02 (ref 1–1.03)
TEAR DROP CELLS: ABNORMAL
TOTAL PROTEIN: 6 G/DL (ref 6.3–8)
UROBILINOGEN, URINE: 0.2 E.U./DL
WBC # BLD: 14.6 K/UL (ref 4.8–10.8)

## 2021-10-01 PROCEDURE — 96375 TX/PRO/DX INJ NEW DRUG ADDON: CPT

## 2021-10-01 PROCEDURE — 36415 COLL VENOUS BLD VENIPUNCTURE: CPT

## 2021-10-01 PROCEDURE — 2580000003 HC RX 258: Performed by: NURSE PRACTITIONER

## 2021-10-01 PROCEDURE — 74177 CT ABD & PELVIS W/CONTRAST: CPT

## 2021-10-01 PROCEDURE — 6360000002 HC RX W HCPCS: Performed by: EMERGENCY MEDICINE

## 2021-10-01 PROCEDURE — 83605 ASSAY OF LACTIC ACID: CPT

## 2021-10-01 PROCEDURE — 6360000002 HC RX W HCPCS: Performed by: NURSE PRACTITIONER

## 2021-10-01 PROCEDURE — 96374 THER/PROPH/DIAG INJ IV PUSH: CPT

## 2021-10-01 PROCEDURE — 83690 ASSAY OF LIPASE: CPT

## 2021-10-01 PROCEDURE — 2500000003 HC RX 250 WO HCPCS: Performed by: EMERGENCY MEDICINE

## 2021-10-01 PROCEDURE — 81003 URINALYSIS AUTO W/O SCOPE: CPT

## 2021-10-01 PROCEDURE — 83735 ASSAY OF MAGNESIUM: CPT

## 2021-10-01 PROCEDURE — 84134 ASSAY OF PREALBUMIN: CPT

## 2021-10-01 PROCEDURE — 6370000000 HC RX 637 (ALT 250 FOR IP): Performed by: EMERGENCY MEDICINE

## 2021-10-01 PROCEDURE — 2580000003 HC RX 258: Performed by: EMERGENCY MEDICINE

## 2021-10-01 PROCEDURE — C9113 INJ PANTOPRAZOLE SODIUM, VIA: HCPCS | Performed by: NURSE PRACTITIONER

## 2021-10-01 PROCEDURE — 85025 COMPLETE CBC W/AUTO DIFF WBC: CPT

## 2021-10-01 PROCEDURE — 99285 EMERGENCY DEPT VISIT HI MDM: CPT

## 2021-10-01 PROCEDURE — 96361 HYDRATE IV INFUSION ADD-ON: CPT

## 2021-10-01 PROCEDURE — 1210000000 HC MED SURG R&B

## 2021-10-01 PROCEDURE — 6360000004 HC RX CONTRAST MEDICATION: Performed by: EMERGENCY MEDICINE

## 2021-10-01 PROCEDURE — 80053 COMPREHEN METABOLIC PANEL: CPT

## 2021-10-01 RX ORDER — ONDANSETRON 2 MG/ML
4 INJECTION INTRAMUSCULAR; INTRAVENOUS ONCE
Status: COMPLETED | OUTPATIENT
Start: 2021-10-01 | End: 2021-10-01

## 2021-10-01 RX ORDER — PANTOPRAZOLE SODIUM 40 MG/10ML
40 INJECTION, POWDER, LYOPHILIZED, FOR SOLUTION INTRAVENOUS DAILY
Status: DISCONTINUED | OUTPATIENT
Start: 2021-10-01 | End: 2021-10-03

## 2021-10-01 RX ORDER — ACETAMINOPHEN 325 MG/1
650 TABLET ORAL EVERY 6 HOURS PRN
Status: DISCONTINUED | OUTPATIENT
Start: 2021-10-01 | End: 2021-10-02

## 2021-10-01 RX ORDER — SODIUM CHLORIDE 0.9 % (FLUSH) 0.9 %
10 SYRINGE (ML) INJECTION 2 TIMES DAILY
Status: DISCONTINUED | OUTPATIENT
Start: 2021-10-01 | End: 2021-10-04 | Stop reason: HOSPADM

## 2021-10-01 RX ORDER — SODIUM CHLORIDE 9 MG/ML
10 INJECTION INTRAVENOUS DAILY
Status: DISCONTINUED | OUTPATIENT
Start: 2021-10-01 | End: 2021-10-03

## 2021-10-01 RX ORDER — ACETAMINOPHEN 650 MG/1
650 SUPPOSITORY RECTAL EVERY 6 HOURS PRN
Status: DISCONTINUED | OUTPATIENT
Start: 2021-10-01 | End: 2021-10-02

## 2021-10-01 RX ORDER — METOCLOPRAMIDE HYDROCHLORIDE 5 MG/ML
10 INJECTION INTRAMUSCULAR; INTRAVENOUS ONCE
Status: COMPLETED | OUTPATIENT
Start: 2021-10-01 | End: 2021-10-01

## 2021-10-01 RX ORDER — DIPHENHYDRAMINE HYDROCHLORIDE 50 MG/ML
25 INJECTION INTRAMUSCULAR; INTRAVENOUS ONCE
Status: COMPLETED | OUTPATIENT
Start: 2021-10-01 | End: 2021-10-01

## 2021-10-01 RX ORDER — SODIUM CHLORIDE, SODIUM LACTATE, POTASSIUM CHLORIDE, CALCIUM CHLORIDE 600; 310; 30; 20 MG/100ML; MG/100ML; MG/100ML; MG/100ML
INJECTION, SOLUTION INTRAVENOUS CONTINUOUS
Status: DISCONTINUED | OUTPATIENT
Start: 2021-10-01 | End: 2021-10-03

## 2021-10-01 RX ORDER — FENTANYL CITRATE 50 UG/ML
50 INJECTION, SOLUTION INTRAMUSCULAR; INTRAVENOUS ONCE
Status: COMPLETED | OUTPATIENT
Start: 2021-10-01 | End: 2021-10-01

## 2021-10-01 RX ORDER — SODIUM CHLORIDE 0.9 % (FLUSH) 0.9 %
5-40 SYRINGE (ML) INJECTION PRN
Status: DISCONTINUED | OUTPATIENT
Start: 2021-10-01 | End: 2021-10-04 | Stop reason: HOSPADM

## 2021-10-01 RX ORDER — CIPROFLOXACIN 2 MG/ML
400 INJECTION, SOLUTION INTRAVENOUS ONCE
Status: COMPLETED | OUTPATIENT
Start: 2021-10-01 | End: 2021-10-01

## 2021-10-01 RX ORDER — ONDANSETRON 4 MG/1
4 TABLET, ORALLY DISINTEGRATING ORAL EVERY 8 HOURS PRN
Status: DISCONTINUED | OUTPATIENT
Start: 2021-10-01 | End: 2021-10-04 | Stop reason: HOSPADM

## 2021-10-01 RX ORDER — SODIUM CHLORIDE 9 MG/ML
25 INJECTION, SOLUTION INTRAVENOUS PRN
Status: DISCONTINUED | OUTPATIENT
Start: 2021-10-01 | End: 2021-10-04 | Stop reason: HOSPADM

## 2021-10-01 RX ORDER — LOPERAMIDE HYDROCHLORIDE 2 MG/1
2 CAPSULE ORAL ONCE
Status: COMPLETED | OUTPATIENT
Start: 2021-10-01 | End: 2021-10-01

## 2021-10-01 RX ORDER — POLYETHYLENE GLYCOL 3350 17 G/17G
17 POWDER, FOR SOLUTION ORAL DAILY PRN
Status: DISCONTINUED | OUTPATIENT
Start: 2021-10-01 | End: 2021-10-04 | Stop reason: HOSPADM

## 2021-10-01 RX ORDER — SODIUM CHLORIDE 0.9 % (FLUSH) 0.9 %
5-40 SYRINGE (ML) INJECTION EVERY 12 HOURS SCHEDULED
Status: DISCONTINUED | OUTPATIENT
Start: 2021-10-01 | End: 2021-10-04 | Stop reason: HOSPADM

## 2021-10-01 RX ORDER — CIPROFLOXACIN 2 MG/ML
400 INJECTION, SOLUTION INTRAVENOUS EVERY 12 HOURS
Status: DISCONTINUED | OUTPATIENT
Start: 2021-10-02 | End: 2021-10-03

## 2021-10-01 RX ORDER — ONDANSETRON 2 MG/ML
4 INJECTION INTRAMUSCULAR; INTRAVENOUS EVERY 6 HOURS PRN
Status: DISCONTINUED | OUTPATIENT
Start: 2021-10-01 | End: 2021-10-04 | Stop reason: HOSPADM

## 2021-10-01 RX ORDER — 0.9 % SODIUM CHLORIDE 0.9 %
1000 INTRAVENOUS SOLUTION INTRAVENOUS ONCE
Status: COMPLETED | OUTPATIENT
Start: 2021-10-01 | End: 2021-10-01

## 2021-10-01 RX ADMIN — LOPERAMIDE HYDROCHLORIDE 2 MG: 2 CAPSULE ORAL at 11:24

## 2021-10-01 RX ADMIN — ONDANSETRON 4 MG: 2 INJECTION INTRAMUSCULAR; INTRAVENOUS at 17:13

## 2021-10-01 RX ADMIN — CIPROFLOXACIN 400 MG: 2 INJECTION, SOLUTION INTRAVENOUS at 17:14

## 2021-10-01 RX ADMIN — FENTANYL CITRATE 50 MCG: 0.05 INJECTION, SOLUTION INTRAMUSCULAR; INTRAVENOUS at 11:19

## 2021-10-01 RX ADMIN — DIPHENHYDRAMINE HYDROCHLORIDE 25 MG: 50 INJECTION, SOLUTION INTRAMUSCULAR; INTRAVENOUS at 11:19

## 2021-10-01 RX ADMIN — HYDROMORPHONE HYDROCHLORIDE 1 MG: 1 INJECTION, SOLUTION INTRAMUSCULAR; INTRAVENOUS; SUBCUTANEOUS at 14:11

## 2021-10-01 RX ADMIN — HYDROMORPHONE HYDROCHLORIDE 1 MG: 1 INJECTION, SOLUTION INTRAMUSCULAR; INTRAVENOUS; SUBCUTANEOUS at 17:12

## 2021-10-01 RX ADMIN — SODIUM CHLORIDE 1000 ML: 9 INJECTION, SOLUTION INTRAVENOUS at 11:19

## 2021-10-01 RX ADMIN — HYDROMORPHONE HYDROCHLORIDE 0.5 MG: 1 INJECTION, SOLUTION INTRAMUSCULAR; INTRAVENOUS; SUBCUTANEOUS at 23:20

## 2021-10-01 RX ADMIN — SODIUM CHLORIDE, POTASSIUM CHLORIDE, SODIUM LACTATE AND CALCIUM CHLORIDE: 600; 310; 30; 20 INJECTION, SOLUTION INTRAVENOUS at 20:45

## 2021-10-01 RX ADMIN — METRONIDAZOLE 500 MG: 500 INJECTION, SOLUTION INTRAVENOUS at 18:25

## 2021-10-01 RX ADMIN — ONDANSETRON 4 MG: 2 INJECTION INTRAMUSCULAR; INTRAVENOUS at 23:20

## 2021-10-01 RX ADMIN — METOCLOPRAMIDE HYDROCHLORIDE 10 MG: 5 INJECTION INTRAMUSCULAR; INTRAVENOUS at 11:19

## 2021-10-01 RX ADMIN — PANTOPRAZOLE SODIUM 40 MG: 40 INJECTION, POWDER, FOR SOLUTION INTRAVENOUS at 20:45

## 2021-10-01 RX ADMIN — IOPAMIDOL 75 ML: 755 INJECTION, SOLUTION INTRAVENOUS at 13:12

## 2021-10-01 RX ADMIN — FAMOTIDINE 20 MG: 10 INJECTION, SOLUTION INTRAVENOUS at 11:18

## 2021-10-01 RX ADMIN — ONDANSETRON 4 MG: 2 INJECTION INTRAMUSCULAR; INTRAVENOUS at 14:11

## 2021-10-01 ASSESSMENT — PAIN SCALES - GENERAL
PAINLEVEL_OUTOF10: 8
PAINLEVEL_OUTOF10: 7
PAINLEVEL_OUTOF10: 8
PAINLEVEL_OUTOF10: 8
PAINLEVEL_OUTOF10: 5

## 2021-10-01 ASSESSMENT — ENCOUNTER SYMPTOMS
VOMITING: 1
COUGH: 0
ABDOMINAL PAIN: 1
ABDOMINAL DISTENTION: 0
BACK PAIN: 0
NAUSEA: 1
BLOOD IN STOOL: 0
SORE THROAT: 0
CONSTIPATION: 0
ANAL BLEEDING: 0
SHORTNESS OF BREATH: 0
DIARRHEA: 1
RESPIRATORY NEGATIVE: 1
EYES NEGATIVE: 1

## 2021-10-01 ASSESSMENT — PAIN DESCRIPTION - DESCRIPTORS
DESCRIPTORS: ACHING
DESCRIPTORS: ACHING

## 2021-10-01 ASSESSMENT — PAIN DESCRIPTION - LOCATION
LOCATION: ABDOMEN

## 2021-10-01 ASSESSMENT — PAIN DESCRIPTION - PAIN TYPE
TYPE: ACUTE PAIN

## 2021-10-01 ASSESSMENT — PAIN DESCRIPTION - FREQUENCY: FREQUENCY: CONTINUOUS

## 2021-10-01 NOTE — H&P
[facility]  MEDICINE    HISTORY AND PHYSICAL EXAM    PATIENT NAME:  Beth Rosas    MRN:  [id number]  SERVICE DATE:  10/1/2021   SERVICE TIME:  3:44 PM    Primary Care Physician: Jean Paul Dooley MD     SUBJECTIVE  CHIEF COMPLAINT:  N/V, weakness    HPI:  Beth Rosas is a 78 y.o., , female who has a past medical history of severe rheumatoid arthritis, fibromyalgia, osteoporosis and Sjogrens syndrome presented to the ED today with complaints of nausea, vomiting, weakness, diarrhea, fever and chills that have been ongoing for approximately 4 weeks now. Reports 10 days ago she presented to her PCP in which blood work was completed and was essentially unremarkable other than elevated TSH. Trula Blew She was encouraged to drink Pedialyte and increase her fluid intake. Reports her diarrhea subsided 9 days ago but however last night she began having diarrhea and emesis. Reports emesis last night was bilious and brown in color. Her PCP was contacted today instructed her to have evaluation in the ED for continued symptoms. In the ED labs and imaging were completed. She received a normal saline bolus, ciprofloxacin, Benadryl, Pepcid, fentanyl, Dilaudid, Imodium, Reglan, Flagyl, Zofran for his symptoms. CT of the abdomen and pelvis was completed. Given her medical complexities the decision made under the hospital service was made. Upon exam she is weak and frail and complains of nausea. Reports chronic pain secondary to her severe rheumatoid arthritis and  is asking for a pain management consult. Denies chest pain shortness of breath.       PAST MEDICAL HISTORY:    Past Medical History:   Diagnosis Date    Fibromyalgia     Fracture of pelvis ([address]     left    History of left hip replacement     History of total left knee replacement     History of total right knee replacement     Light sensitivity     bilateral eye    Osteoporosis     Rheumatoid arthritis (HCC)     Sjogrens syndrome [facility])      PAST SURGICAL HISTORY:    Past Surgical History:   Procedure Laterality Date    CATARACT REMOVAL WITH IMPLANT  01/2013    bilateral    CHOLECYSTECTOMY      OTHER SURGICAL HISTORY      bilateral eye implant    OVARY REMOVAL  06/2000    right    REVISION TOTAL HIP ARTHROPLASTY  10/2012    left    TOTAL HIP ARTHROPLASTY  04/1990    left    TOTAL KNEE ARTHROPLASTY  08/1997    right and left     FAMILY HISTORY:  No family history on file. SOCIAL HISTORY:    Social History     Socioeconomic History    Marital status:      Spouse name: Not on file    Number of children: Not on file    Years of education: Not on file    Highest education level: Not on file   Occupational History    Not on file   Tobacco Use    Smoking status: Never Smoker    Smokeless tobacco: Never Used   Vaping Use    Vaping Use: Never used   Substance and Sexual Activity    Alcohol use: Never    Drug use: Never    Sexual activity: Not Currently   Other Topics Concern    Not on file   Social History Narrative    Not on file     Social Determinants of Health     Financial Resource Strain: Low Risk     Difficulty of Paying Living Expenses: Not hard at all   Food Insecurity: No Food Insecurity    Worried About [address] [employer] in the Last Year: Never true    [address] in the Last Year: Never true   Transportation Needs:     Lack of Transportation (Medical):      Lack of Transportation (Non-Medical):    Physical Activity:     Days of Exercise per Week:     Minutes of Exercise per Session:    Stress:     Feeling of Stress :    Social Connections:     Frequency of Communication with Friends and Family:     Frequency of Social Gatherings with Friends and Family:     Attends Jew Services:     Active Member of Clubs or Organizations:     Attends Club or Organization Meetings:     Marital Status:    Intimate Partner Violence:     Fear of Current or Ex-Partner:     Emotionally Abused:     Physically Abused:     Sexually Abused:      MEDICATIONS:   Prior to Admission medications    Medication Sig Start Date End Date Taking? Authorizing Provider   Chlorpheniramine Maleate (CHLOR-TABLETS PO) Take 4 mg by mouth   Yes Historical Provider, MD   etanercept (ENBREL) 50 MG/ML injection Inject into the skin once a week    Yes Historical Provider, MD   folic acid (FOLVITE) 1 MG tablet TAKE 1 TABLET BY MOUTH ONCE DAILY 7/5/20  Yes Historical Provider, MD   predniSONE (DELTASONE) 5 MG tablet Take 5 mg by mouth daily   Yes Historical Provider, MD   Calcium Carbonate-Vit D-Min (CALTRATE 600+D PLUS PO) Take by mouth daily   Yes Historical Provider, MD   levothyroxine (SYNTHROID) 100 MCG tablet TAKE 1 TABLET BY MOUTH ONCE DAILY 7/7/20  Yes Historical Provider, MD   Lifitegrast Emelyn Flattery) 5 % SOLN Apply 1 drop to eye 2 times daily 7/23/20  Yes Historical Provider, MD   oxyCODONE-acetaminophen (PERCOCET) 7.5-325 MG per tablet TAKE 1 2 (ONE HALF) TABLET BY MOUTH EVERY 4 HOURS AS NEEDED AND 1 AT BEDTIME AS NEEDED AS DIRECTED 9/4/20  Yes Historical Provider, MD   naproxen (NAPROSYN) 500 MG tablet TAKE 1 TABLET BY MOUTH TWICE DAILY AS NEEDED 6/23/20  Yes Historical Provider, MD   zoledronic acid (RECLAST) 5 MG/100ML SOLN Infuse 5 mg intravenously once   Yes Historical Provider, MD   Multiple Vitamins-Minerals (CENTRUM ADULTS PO) Take by mouth   Yes Historical Provider, MD   Carboxymethylcellul-Glycerin (Cookie Mix OP) Apply to eye   Yes Historical Provider, MD   Pregabalin (LYRICA PO) Take 7 mLs by mouth nightly   Yes Historical Provider, MD   iron sucrose (VENOFER) 20 MG/ML injection Infuse 100 mg intravenously Once in dialysis    Yes Historical Provider, MD       ALLERGIES: Hydrocodone-acetaminophen, Hydroxychloroquine, Indomethacin, Amoxicillin, Lactose, and Other    REVIEW OF SYSTEM:   Review of Systems   Constitutional: Positive for activity change, appetite change, chills, fatigue and fever.  Negative for diaphoresis and unexpected weight change. HENT: Negative. Eyes: Negative. Respiratory: Negative. Cardiovascular: Negative. Gastrointestinal: Positive for abdominal pain, diarrhea, nausea and vomiting. Negative for abdominal distention, anal bleeding, blood in stool and constipation. Endocrine: Negative. Genitourinary: Negative. Musculoskeletal: Positive for joint swelling and myalgias. Skin: Positive for pallor. Neurological: Positive for weakness. Hematological: Negative. Psychiatric/Behavioral: Negative. OBJECTIVE  PHYSICAL EXAM:   Physical Exam  Constitutional:       Appearance: She is cachectic. She is ill-appearing. Comments: Appears older than stated age   HENT:      Head: Normocephalic and atraumatic. Right Ear: External ear normal.      Left Ear: External ear normal.      Mouth/Throat:      Mouth: Mucous membranes are dry. Eyes:      Conjunctiva/sclera: Conjunctivae normal.   Cardiovascular:      Rate and Rhythm: Normal rate. Pulses:           Dorsalis pedis pulses are 1+ on the right side and 1+ on the left side. Pulmonary:      Effort: Pulmonary effort is normal. No respiratory distress. Breath sounds: No stridor. No wheezing, rhonchi or rales. Abdominal:      General: Abdomen is flat. Bowel sounds are decreased. Tenderness: There is generalized abdominal tenderness. Musculoskeletal:         General: Swelling, tenderness and deformity present. Right lower le+ Edema present. Left lower le+ Edema present. Comments: Severe deformities to her joint spaces  Muscle wasting   Skin:     General: Skin is cool and dry. Capillary Refill: Capillary refill takes 2 to 3 seconds. Coloration: Skin is pale. Comments: Dressing. No open area   Neurological:      General: No focal deficit present. Mental Status: She is easily aroused. Motor: Weakness present.       Comments: Easily arousable, alert and follows commands   Psychiatric:         Mood and Affect: Affect is flat. /71   Pulse 66   Temp 98.5 °F (36.9 °C) (Oral)   Resp 16   Ht 5' 3\" (1.6 m)   Wt 80 lb (36.3 kg)   SpO2 97%   BMI 14.17 kg/m²     DATA:     Diagnostic tests reviewed for today's visit:    Most recent labs and imaging results reviewed.      LABS:    Recent Results (from the past 24 hour(s))   Urinalysis    Collection Time: 10/01/21 10:45 AM   Result Value Ref Range    Color, UA Yellow Straw/Yellow    Clarity, UA Clear Clear    Glucose, Ur Negative Negative mg/dL    Bilirubin Urine Negative Negative    Ketones, Urine 15 (A) Negative mg/dL    Specific Gravity, UA 1.019 1.005 - 1.030    Blood, Urine Negative Negative    pH, UA 7.0 5.0 - 9.0    Protein, UA Negative Negative mg/dL    Urobilinogen, Urine 0.2 <2.0 E.U./dL    Nitrite, Urine Negative Negative    Leukocyte Esterase, Urine Negative Negative   Comprehensive Metabolic Panel    Collection Time: 10/01/21  1:36 PM   Result Value Ref Range    Sodium 135 135 - 144 mEq/L    Potassium 4.1 3.4 - 4.9 mEq/L    Chloride 98 95 - 107 mEq/L    CO2 20 20 - 31 mEq/L    Anion Gap 17 (H) 9 - 15 mEq/L    Glucose 80 70 - 99 mg/dL    BUN 8 8 - 23 mg/dL    CREATININE 0.33 (L) 0.50 - 0.90 mg/dL    GFR Non-African American >60.0 >60    GFR  >60.0 >60    Calcium 8.2 (L) 8.5 - 9.9 mg/dL    Total Protein 6.0 (L) 6.3 - 8.0 g/dL    Albumin 3.1 (L) 3.5 - 4.6 g/dL    Total Bilirubin 0.3 0.2 - 0.7 mg/dL    Alkaline Phosphatase 57 40 - 130 U/L    ALT <5 0 - 33 U/L    AST 29 0 - 35 U/L    Globulin 2.9 2.3 - 3.5 g/dL   CBC Auto Differential    Collection Time: 10/01/21  1:36 PM   Result Value Ref Range    WBC 14.6 (H) 4.8 - 10.8 K/uL    RBC 4.18 (L) 4.20 - 5.40 M/uL    Hemoglobin 10.9 (L) 12.0 - 16.0 g/dL    Hematocrit 35.4 (L) 37.0 - 47.0 %    MCV 84.8 82.0 - 100.0 fL    MCH 26.1 (L) 27.0 - 31.3 pg    MCHC 30.7 (L) 33.0 - 37.0 %    RDW 27.8 (H) 11.5 - 14.5 %    Platelets 732 (H) 068 - 400 The urinary bladder is very distended there is free fluid seen in the posterior pelvis. The pancreas is atrophic. Mild intrahepatic ductal dilation is seen but is thought likely due to cholecystectomy. The liver is decreased in attenuation. The spleen is unremarkable. No focal masses identified in the adrenal glands. Bilaterally both kidneys show uptake and excretion of contrast with no evidence for hydronephrosis or renal calculi. Diffuse atherosclerotic calcifications are seen in the abdomen and pelvis. A total hip prosthetic is seen on the left. Severe intervertebral disc space narrowing is seen at L4-5. Anterolisthesis of L4 on L5 is seen. The L5 vertebral body is posterior in relationship to L4 and protrudes posteriorly into the spinal canal. This appears to be chronic. Impression: 1. Multiple fluid-filled loops of small bowel are seen that could represent ileus. . The cerebellum measure up to 2.5 cm in greatest transverse diameter. In the right lower pelvis there are narrowed loops of bowel visualized and developing small bowel obstruction is not excluded. 2. Thick walled loops of large bowel are seen versus incomplete distention that may reflect colitis. 3. There is free fluid seen in the abdomen and pelvis and the liver is decreased in attenuation. The pancreas is atrophic. Sclerosis is also a consideration. 4. Severe intervertebral disc space narrowing is seen at L4-5 and the L5 vertebral body is displaced posteriorly and appears to be chronically position with sclerosis visualized. All CT scans at this facility use dose modulation, iterative reconstruction, and/or weight based dosing       VTE Prophylaxis: SCDs    ASSESSMENT AND PLAN    Non-intractable nausea and vomiting in the setting of colitis versus ileus. CT the abdomen completed there are multiple fluid-filled loops of small bowel that could represent ileus.   Possible developing small bowel obstruction is not excluded, possible colitis free fluid seen in the abdomen and pelvis. Clear liquids only. Antiemetics, IV antibiotics, IV PPI, IV fluids, GI consult, stool cultures, stool for C. difficile. Further recommendations per GI    Lactic acidosis in the setting of decreased intake: Continue IV fluids, repeat lactic in 2 hours. Severe protein calorie malnutrition: Consult dietitian for further recommendations. BMI 14.17. Thin and frail in appearance. Muscle wasting apparent. Check PAB    Iron deficiency anemia: H&H stable. Follow labs. Receives IV infusions outpatient. Chronic pain in the setting of severe RA: Consult pain management. Dilaudid every 4 hours as needed. Review and resume home medications once verified. Hypothyroidism: Follow-up with PCP outpatient     GI prophylaxis as on long-term steroids. Advanced care planning: [facility]- would benefit from palliative care    Debility:  is primary care taker. Patient minimally ambulatory. Severely malnourished. Generalized weakness. PT/OT consults.        Plan of care discussed with: patient and family    SIGNATURE: ROBIN Vela NP  DATE: October 1, 2021  TIME: 3:44 PM   No admitting provider for patient encounter.  - supervising physician

## 2021-10-01 NOTE — ED TRIAGE NOTES
Pt brought in by EMS due to being weak and having pain in ABD. Pt  called Dr. Kusum Calderon and he suggested that pt come to the ER due to possible bowel obstruction. Pt denies any chest pain. Pt denies any H/A. Pt states abd for 4 weeks and states has had diarrhea off and on for that time. Pt states that she was eating and drinking until last night at 10:30 pm when she started vomiting. Pt is alert and oriented times 3.

## 2021-10-01 NOTE — ED NOTES
Pt lying in bed and alert and oriented at this time. Pt and  educated on medications given at this time. Pt states that pain is at an 8/10.       Master Bower RN  10/01/21 [id number] Gilmar Solorzano  10/01/21 [id number]

## 2021-10-01 NOTE — ED NOTES
Viet Erazo RN attempted to get labs on pt, unable at this time. Pt arms placed in warm towels and will reattempt.       Brendan Arrieta RN  10/01/21 [id number]

## 2021-10-01 NOTE — ED PROVIDER NOTES
[address] [facility] ED  eMERGENCYdEPARTMENT eNCOUnter      Pt Name: Heidi Catherine  MRN: [id number]  [place]io 1955  Date of evaluation: 10/1/2021  Jose Bella MD    CHIEF COMPLAINT           HPI  Heidi Catherine is a 78 y.o. female per chart review has a h/o fibromyalgia, RA with chronic hand and ankle deformities who does not walk, HTN, hpl presents to the ED with ab pain, n/v/d. Pt notes gradual onset, moderate, constant, sharp, epigastric ab pain x 4 weeks. +N/v/d. Pt denies fever, cp, sob, dysuria. ROS  Review of Systems   Constitutional: Negative for activity change, chills and fever. HENT: Negative for ear pain and sore throat. Eyes: Negative for visual disturbance. Respiratory: Negative for cough and shortness of breath. Cardiovascular: Negative for chest pain, palpitations and leg swelling. Gastrointestinal: Positive for abdominal pain, diarrhea, nausea and vomiting. Genitourinary: Negative for dysuria. Musculoskeletal: Negative for back pain. Skin: Negative for rash. Neurological: Negative for dizziness and weakness. Except as noted above the remainder of the review of systems was reviewed and negative.        PAST MEDICAL HISTORY     Past Medical History:   Diagnosis Date    Fibromyalgia     Fracture of pelvis ([address]     left    History of left hip replacement     History of total left knee replacement     History of total right knee replacement     Light sensitivity     bilateral eye    Osteoporosis     Rheumatoid arthritis ([address]     Sjogrens syndrome ([address]          SURGICAL HISTORY       Past Surgical History:   Procedure Laterality Date    CATARACT REMOVAL WITH IMPLANT  01/2013    bilateral    CHOLECYSTECTOMY      OTHER SURGICAL HISTORY      bilateral eye implant    OVARY REMOVAL  06/2000    right    REVISION TOTAL HIP ARTHROPLASTY  10/2012    left    TOTAL HIP ARTHROPLASTY  04/1990    left    TOTAL KNEE ARTHROPLASTY  08/1997    right and left         CURRENTMEDICATIONS       Previous Medications    CALCIUM CARBONATE-VIT D-MIN (CALTRATE 600+D PLUS PO)    Take by mouth daily    CARBOXYMETHYLCELLUL-GLYCERIN (REFRESH RELIEVA OP)    Apply to eye    CHLORPHENIRAMINE MALEATE (CHLOR-TABLETS PO)    Take 4 mg by mouth    ETANERCEPT (ENBREL) 50 MG/ML INJECTION    Inject into the skin once a week     FOLIC ACID (FOLVITE) 1 MG TABLET    TAKE 1 TABLET BY MOUTH ONCE DAILY    IRON SUCROSE (VENOFER) 20 MG/ML INJECTION    Infuse 100 mg intravenously Once in dialysis     LEVOTHYROXINE (SYNTHROID) 100 MCG TABLET    TAKE 1 TABLET BY MOUTH ONCE DAILY    LIFITEGRAST (XIIDRA) 5 % SOLN    Apply 1 drop to eye 2 times daily    MULTIPLE VITAMINS-MINERALS (CENTRUM ADULTS PO)    Take by mouth    NAPROXEN (NAPROSYN) 500 MG TABLET    TAKE 1 TABLET BY MOUTH TWICE DAILY AS NEEDED    OXYCODONE-ACETAMINOPHEN (PERCOCET) 7.5-325 MG PER TABLET    TAKE 1 2 (ONE HALF) TABLET BY MOUTH EVERY 4 HOURS AS NEEDED AND 1 AT BEDTIME AS NEEDED AS DIRECTED    PREDNISONE (DELTASONE) 5 MG TABLET    Take 5 mg by mouth daily    PREGABALIN (LYRICA PO)    Take 7 mLs by mouth nightly    ZOLEDRONIC ACID (RECLAST) 5 MG/100ML SOLN    Infuse 5 mg intravenously once       ALLERGIES     Hydrocodone-acetaminophen, Hydroxychloroquine, Indomethacin, Amoxicillin, Lactose, and Other    FAMILY HISTORY     No family history on file.        SOCIAL HISTORY       Social History     Socioeconomic History    Marital status:      Spouse name: Not on file    Number of children: Not on file    Years of education: Not on file    Highest education level: Not on file   Occupational History    Not on file   Tobacco Use    Smoking status: Never Smoker    Smokeless tobacco: Never Used   Vaping Use    Vaping Use: Never used   Substance and Sexual Activity    Alcohol use: Never    Drug use: Never    Sexual activity: Not Currently   Other Topics Concern    Not on file   Social History Narrative    Not on file Social Determinants of Health     Financial Resource Strain: Low Risk     Difficulty of Paying Living Expenses: Not hard at all   Food Insecurity: No Food Insecurity    Worried About Running Out of Food in the Last Year: Never true    [place] [employer] in the Last Year: Never true   Transportation Needs:     Lack of Transportation (Medical):  Lack of Transportation (Non-Medical):    Physical Activity:     Days of Exercise per Week:     Minutes of Exercise per Session:    Stress:     Feeling of Stress :    Social Connections:     Frequency of Communication with Friends and Family:     Frequency of Social Gatherings with Friends and Family:     Attends Latter-day Services:     Active Member of Clubs or Organizations:     Attends Club or Organization Meetings:     Marital Status:    Intimate Partner Violence:     Fear of Current or Ex-Partner:     Emotionally Abused:     Physically Abused:     Sexually Abused:          PHYSICAL EXAM       ED Triage Vitals [10/01/21 1040]   BP Temp Temp Source Pulse Resp SpO2 Height Weight   134/67 98.5 °F (36.9 °C) Oral 69 18 96 % 5' 3\" (1.6 m) 80 lb (36.3 kg)       Physical Exam  Vitals and nursing note reviewed. Constitutional:       Appearance: She is well-developed. HENT:      Head: Normocephalic. Right Ear: External ear normal.      Left Ear: External ear normal.   Eyes:      Conjunctiva/sclera: Conjunctivae normal.      Pupils: Pupils are equal, round, and reactive to light. Cardiovascular:      Rate and Rhythm: Normal rate and regular rhythm. Heart sounds: Normal heart sounds. Pulmonary:      Effort: Pulmonary effort is normal.      Breath sounds: Normal breath sounds. Abdominal:      General: Bowel sounds are normal. There is no distension. Palpations: Abdomen is soft. Tenderness: There is abdominal tenderness in the epigastric area. There is no guarding or rebound. Musculoskeletal:         General: Normal range of motion. Cervical back: Normal range of motion and neck supple. Skin:     General: Skin is warm and dry. Neurological:      Mental Status: She is alert and oriented to person, place, and time. Psychiatric:         Mood and Affect: Mood normal.           MDM  77 yo female presents to the ED with ab pain, n/v/d. Pt is afebrile, hemodynamically stable. Pt given 1 L NS, IV fentanyl, IV reglan, IV benadryl, IV pepcid, PO loperamide in the ED with moderate relief. Labs remarkable for WBC 15, Hb 10.9, lactic acid 2.7.  UA negative. CT AP shows enteritis and colitis. Pt reassessed and still having pain. Pt given IV dilaudid, IV zofran in the ED. Pt however still unable to tolerate PO. Pt given IV cipro, IV flagyl in the ED. However, given colitis, n/v/d, inability to tolerate PO, case discussed with Dr. Zahraa Wiley (medicine) and pt admitted to medicine in stable condition. FINAL IMPRESSION      1. Colitis    2. Non-intractable vomiting with nausea, unspecified vomiting type    3. Diarrhea, unspecified type    4.  Lactic acidosis          DISPOSITION/PLAN   DISPOSITION Decision To Admit 10/01/2021 03:31:03 PM        DISCHARGE MEDICATIONS:  [unfilled]         Hermelinda Ballesteros MD(electronically signed)  Attending Emergency Physician            Hermelinda Ballesteros MD  10/01/21 [id number]

## 2021-10-01 NOTE — TELEPHONE ENCOUNTER
Checked on patient 3 days ago. She states that she was having more energy and appetite was returning and had no longer had diarrhea for 9 days. She also mentions that she had restarted Enbrel from her rheumatologist.     Received a call today from . Who states that patient began vomiting last night and is continued to this morning. Emesis contents were bilious and brown-colored. Patient has had increasing weakness.   I advised patient to be seen in the emergency room

## 2021-10-01 NOTE — CARE COORDINATION
[facility] AT Advance Case Management Initial Discharge Assessment    Met with Patient to discuss discharge plan. PCP: Marcella Grayson MD                                Date of Last Visit: last week    If no PCP, list provided? N/A    Discharge Planning    Living Arrangements: independently at home    Who do you live with?     Who helps you with your care:  self    If lives at home:     Do you have any barriers navigating in your home? no    Patient can perform ADL? Yes    Current Services (outpatient and in home) :  None    Dialysis: No    Is transportation available to get to your appointments? Yes    DME Equipment:  yes - walker, wheel chair    Respiratory equipment: None    Respiratory provider:  no     Pharmacy:  yes - [facility]    Consult with Medication Assistance Program?  No      Does Patient Have a High-Risk for Readmission Diagnosis (CHF, PN, MI, COPD)? No    Initial Discharge Plan? (Note: please see concurrent daily documentation for any updates after initial note). Pt was very tired during the interview and states she is not sure what she will need after d/c. Cm to assess.     Readmission Risk              Risk of Unplanned Readmission:  8         Electronically signed by Ramiro Sanders on 10/1/2021 at 7:26 PM

## 2021-10-01 NOTE — ACP (ADVANCE CARE PLANNING)
Advance Care Planning     Advance Care Planning Activator (Inpatient)  Conversation Note      Date of ACP Conversation: 10/1/2021     Conversation Conducted with: Patient with Decision Making Capacity    ACP Activator: Adriana Patricio    Pt states that she does have a living will.  Dr benton [facility].

## 2021-10-02 PROBLEM — R13.10 DIFFICULTY SWALLOWING: Status: ACTIVE | Noted: 2021-10-02

## 2021-10-02 PROBLEM — M62.81 MUSCLE WEAKNESS (GENERALIZED): Status: ACTIVE | Noted: 2021-10-02

## 2021-10-02 PROBLEM — M79.7 FIBROMYALGIA, SECONDARY: Status: ACTIVE | Noted: 2021-10-02

## 2021-10-02 PROBLEM — Z79.52 LONG TERM (CURRENT) USE OF SYSTEMIC STEROIDS: Status: ACTIVE | Noted: 2021-10-02

## 2021-10-02 PROBLEM — M21.969: Status: ACTIVE | Noted: 2021-10-02

## 2021-10-02 PROBLEM — M79.10 GENERALIZED MUSCLE ACHE: Status: ACTIVE | Noted: 2021-10-02

## 2021-10-02 PROBLEM — Z79.891 LONG-TERM CURRENT USE OF OPIATE ANALGESIC: Status: ACTIVE | Noted: 2021-10-02

## 2021-10-02 PROBLEM — R26.9 ABNORMALITY OF GAIT: Status: ACTIVE | Noted: 2021-10-02

## 2021-10-02 PROBLEM — M21.949 ACQUIRED DEFORMITY OF HAND: Status: ACTIVE | Noted: 2021-10-02

## 2021-10-02 PROBLEM — G89.29 CHRONIC PAIN: Status: ACTIVE | Noted: 2018-11-07

## 2021-10-02 LAB
ANION GAP SERPL CALCULATED.3IONS-SCNC: 11 MEQ/L (ref 9–15)
ANION GAP SERPL CALCULATED.3IONS-SCNC: 12 MEQ/L (ref 9–15)
BASOPHILS ABSOLUTE: 0.1 K/UL (ref 0–0.2)
BASOPHILS RELATIVE PERCENT: 0.5 %
BUN BLDV-MCNC: 7 MG/DL (ref 8–23)
BUN BLDV-MCNC: 8 MG/DL (ref 8–23)
CALCIUM SERPL-MCNC: 7.3 MG/DL (ref 8.5–9.9)
CALCIUM SERPL-MCNC: 7.4 MG/DL (ref 8.5–9.9)
CHLORIDE BLD-SCNC: 100 MEQ/L (ref 95–107)
CHLORIDE BLD-SCNC: 101 MEQ/L (ref 95–107)
CO2: 19 MEQ/L (ref 20–31)
CO2: 23 MEQ/L (ref 20–31)
CREAT SERPL-MCNC: 0.35 MG/DL (ref 0.5–0.9)
CREAT SERPL-MCNC: 0.37 MG/DL (ref 0.5–0.9)
EOSINOPHILS ABSOLUTE: 0 K/UL (ref 0–0.7)
EOSINOPHILS RELATIVE PERCENT: 0.2 %
GFR AFRICAN AMERICAN: >60
GFR AFRICAN AMERICAN: >60
GFR NON-AFRICAN AMERICAN: >60
GFR NON-AFRICAN AMERICAN: >60
GLUCOSE BLD-MCNC: 95 MG/DL (ref 70–99)
GLUCOSE BLD-MCNC: 97 MG/DL (ref 70–99)
HCT VFR BLD CALC: 29.6 % (ref 37–47)
HEMOGLOBIN: 9.3 G/DL (ref 12–16)
LACTIC ACID: 0.8 MMOL/L (ref 0.5–2.2)
LACTIC ACID: 1.1 MMOL/L (ref 0.5–2.2)
LACTIC ACID: 1.2 MMOL/L (ref 0.5–2.2)
LACTIC ACID: 1.3 MMOL/L (ref 0.5–2.2)
LYMPHOCYTES ABSOLUTE: 0.7 K/UL (ref 1–4.8)
LYMPHOCYTES RELATIVE PERCENT: 6.7 %
MAGNESIUM: 1.8 MG/DL (ref 1.7–2.4)
MCH RBC QN AUTO: 26.7 PG (ref 27–31.3)
MCHC RBC AUTO-ENTMCNC: 31.5 % (ref 33–37)
MCV RBC AUTO: 84.6 FL (ref 82–100)
MONOCYTES ABSOLUTE: 1 K/UL (ref 0.2–0.8)
MONOCYTES RELATIVE PERCENT: 8.7 %
NEUTROPHILS ABSOLUTE: 9.3 K/UL (ref 1.4–6.5)
NEUTROPHILS RELATIVE PERCENT: 83.9 %
PDW BLD-RTO: 27.2 % (ref 11.5–14.5)
PLATELET # BLD: 296 K/UL (ref 130–400)
POTASSIUM REFLEX MAGNESIUM: 2.9 MEQ/L (ref 3.4–4.9)
POTASSIUM SERPL-SCNC: 4.2 MEQ/L (ref 3.4–4.9)
RBC # BLD: 3.5 M/UL (ref 4.2–5.4)
SODIUM BLD-SCNC: 131 MEQ/L (ref 135–144)
SODIUM BLD-SCNC: 135 MEQ/L (ref 135–144)
WBC # BLD: 11.1 K/UL (ref 4.8–10.8)

## 2021-10-02 PROCEDURE — 97162 PT EVAL MOD COMPLEX 30 MIN: CPT

## 2021-10-02 PROCEDURE — 99222 1ST HOSP IP/OBS MODERATE 55: CPT | Performed by: INTERNAL MEDICINE

## 2021-10-02 PROCEDURE — 2500000003 HC RX 250 WO HCPCS: Performed by: NURSE PRACTITIONER

## 2021-10-02 PROCEDURE — 6360000002 HC RX W HCPCS: Performed by: NURSE PRACTITIONER

## 2021-10-02 PROCEDURE — 83605 ASSAY OF LACTIC ACID: CPT

## 2021-10-02 PROCEDURE — 1210000000 HC MED SURG R&B

## 2021-10-02 PROCEDURE — 2580000003 HC RX 258: Performed by: NURSE PRACTITIONER

## 2021-10-02 PROCEDURE — 6360000002 HC RX W HCPCS: Performed by: INTERNAL MEDICINE

## 2021-10-02 PROCEDURE — 83735 ASSAY OF MAGNESIUM: CPT

## 2021-10-02 PROCEDURE — 6370000000 HC RX 637 (ALT 250 FOR IP): Performed by: ANESTHESIOLOGY

## 2021-10-02 PROCEDURE — 2580000003 HC RX 258: Performed by: EMERGENCY MEDICINE

## 2021-10-02 PROCEDURE — C9113 INJ PANTOPRAZOLE SODIUM, VIA: HCPCS | Performed by: NURSE PRACTITIONER

## 2021-10-02 PROCEDURE — 80048 BASIC METABOLIC PNL TOTAL CA: CPT

## 2021-10-02 PROCEDURE — 36415 COLL VENOUS BLD VENIPUNCTURE: CPT

## 2021-10-02 PROCEDURE — 85025 COMPLETE CBC W/AUTO DIFF WBC: CPT

## 2021-10-02 PROCEDURE — 6370000000 HC RX 637 (ALT 250 FOR IP): Performed by: NURSE PRACTITIONER

## 2021-10-02 PROCEDURE — 6370000000 HC RX 637 (ALT 250 FOR IP): Performed by: INTERNAL MEDICINE

## 2021-10-02 RX ORDER — LIDOCAINE 4 G/G
3 PATCH TOPICAL DAILY
Status: DISCONTINUED | OUTPATIENT
Start: 2021-10-03 | End: 2021-10-04 | Stop reason: HOSPADM

## 2021-10-02 RX ORDER — PREDNISONE 1 MG/1
5 TABLET ORAL DAILY
Status: DISCONTINUED | OUTPATIENT
Start: 2021-10-02 | End: 2021-10-04 | Stop reason: HOSPADM

## 2021-10-02 RX ORDER — LEVOTHYROXINE SODIUM 0.1 MG/1
100 TABLET ORAL DAILY
Status: DISCONTINUED | OUTPATIENT
Start: 2021-10-02 | End: 2021-10-04 | Stop reason: HOSPADM

## 2021-10-02 RX ORDER — LIDOCAINE 4 G/G
1 PATCH TOPICAL DAILY
Status: DISCONTINUED | OUTPATIENT
Start: 2021-10-02 | End: 2021-10-02

## 2021-10-02 RX ORDER — POTASSIUM CHLORIDE 7.45 MG/ML
10 INJECTION INTRAVENOUS
Status: DISPENSED | OUTPATIENT
Start: 2021-10-02 | End: 2021-10-02

## 2021-10-02 RX ORDER — FOLIC ACID 1 MG/1
1 TABLET ORAL DAILY
Status: DISCONTINUED | OUTPATIENT
Start: 2021-10-02 | End: 2021-10-04 | Stop reason: HOSPADM

## 2021-10-02 RX ORDER — TROLAMINE SALICYLATE 10 G/100G
CREAM TOPICAL 4 TIMES DAILY PRN
Status: DISCONTINUED | OUTPATIENT
Start: 2021-10-02 | End: 2021-10-04 | Stop reason: HOSPADM

## 2021-10-02 RX ORDER — GABAPENTIN 250 MG/5ML
125 SOLUTION ORAL EVERY 12 HOURS SCHEDULED
Status: DISCONTINUED | OUTPATIENT
Start: 2021-10-02 | End: 2021-10-04 | Stop reason: HOSPADM

## 2021-10-02 RX ORDER — FENTANYL 12 UG/H
1 PATCH TRANSDERMAL
Status: DISCONTINUED | OUTPATIENT
Start: 2021-10-02 | End: 2021-10-04 | Stop reason: HOSPADM

## 2021-10-02 RX ORDER — ACETAMINOPHEN 160 MG/5ML
650 SOLUTION ORAL EVERY 4 HOURS PRN
Status: DISCONTINUED | OUTPATIENT
Start: 2021-10-02 | End: 2021-10-04 | Stop reason: HOSPADM

## 2021-10-02 RX ORDER — OXYCODONE AND ACETAMINOPHEN 7.5; 325 MG/1; MG/1
1 TABLET ORAL EVERY 4 HOURS PRN
Status: DISCONTINUED | OUTPATIENT
Start: 2021-10-02 | End: 2021-10-04 | Stop reason: HOSPADM

## 2021-10-02 RX ADMIN — OXYCODONE AND ACETAMINOPHEN 1 TABLET: 7.5; 325 TABLET ORAL at 21:20

## 2021-10-02 RX ADMIN — SODIUM CHLORIDE, POTASSIUM CHLORIDE, SODIUM LACTATE AND CALCIUM CHLORIDE: 600; 310; 30; 20 INJECTION, SOLUTION INTRAVENOUS at 22:48

## 2021-10-02 RX ADMIN — CIPROFLOXACIN 400 MG: 2 INJECTION, SOLUTION INTRAVENOUS at 04:06

## 2021-10-02 RX ADMIN — LEVOTHYROXINE SODIUM 100 MCG: 0.1 TABLET ORAL at 07:49

## 2021-10-02 RX ADMIN — POTASSIUM CHLORIDE 10 MEQ: 7.46 INJECTION, SOLUTION INTRAVENOUS at 23:44

## 2021-10-02 RX ADMIN — HYDROMORPHONE HYDROCHLORIDE 0.5 MG: 1 INJECTION, SOLUTION INTRAMUSCULAR; INTRAVENOUS; SUBCUTANEOUS at 04:06

## 2021-10-02 RX ADMIN — HYDROMORPHONE HYDROCHLORIDE 0.5 MG: 1 INJECTION, SOLUTION INTRAMUSCULAR; INTRAVENOUS; SUBCUTANEOUS at 09:03

## 2021-10-02 RX ADMIN — METRONIDAZOLE 500 MG: 500 INJECTION, SOLUTION INTRAVENOUS at 23:52

## 2021-10-02 RX ADMIN — METRONIDAZOLE 500 MG: 500 INJECTION, SOLUTION INTRAVENOUS at 11:59

## 2021-10-02 RX ADMIN — POTASSIUM CHLORIDE 10 MEQ: 7.46 INJECTION, SOLUTION INTRAVENOUS at 21:25

## 2021-10-02 RX ADMIN — PREDNISONE 5 MG: 5 TABLET ORAL at 12:00

## 2021-10-02 RX ADMIN — METRONIDAZOLE 500 MG: 500 INJECTION, SOLUTION INTRAVENOUS at 02:27

## 2021-10-02 RX ADMIN — CIPROFLOXACIN 400 MG: 2 INJECTION, SOLUTION INTRAVENOUS at 21:23

## 2021-10-02 RX ADMIN — ONDANSETRON 4 MG: 2 INJECTION INTRAMUSCULAR; INTRAVENOUS at 16:20

## 2021-10-02 RX ADMIN — POTASSIUM CHLORIDE 10 MEQ: 7.46 INJECTION, SOLUTION INTRAVENOUS at 19:18

## 2021-10-02 RX ADMIN — SODIUM CHLORIDE, PRESERVATIVE FREE 10 ML: 5 INJECTION INTRAVENOUS at 21:10

## 2021-10-02 RX ADMIN — POTASSIUM CHLORIDE 10 MEQ: 7.46 INJECTION, SOLUTION INTRAVENOUS at 16:02

## 2021-10-02 RX ADMIN — POTASSIUM CHLORIDE 10 MEQ: 7.46 INJECTION, SOLUTION INTRAVENOUS at 17:25

## 2021-10-02 RX ADMIN — FOLIC ACID 1 MG: 1 TABLET ORAL at 12:07

## 2021-10-02 RX ADMIN — OXYCODONE AND ACETAMINOPHEN 1 TABLET: 7.5; 325 TABLET ORAL at 16:20

## 2021-10-02 RX ADMIN — PANTOPRAZOLE SODIUM 40 MG: 40 INJECTION, POWDER, FOR SOLUTION INTRAVENOUS at 21:10

## 2021-10-02 RX ADMIN — OXYCODONE AND ACETAMINOPHEN 1 TABLET: 7.5; 325 TABLET ORAL at 12:00

## 2021-10-02 RX ADMIN — ONDANSETRON 4 MG: 2 INJECTION INTRAMUSCULAR; INTRAVENOUS at 09:03

## 2021-10-02 RX ADMIN — ONDANSETRON 4 MG: 2 INJECTION INTRAMUSCULAR; INTRAVENOUS at 22:44

## 2021-10-02 RX ADMIN — SODIUM CHLORIDE, PRESERVATIVE FREE 10 ML: 5 INJECTION INTRAVENOUS at 21:15

## 2021-10-02 ASSESSMENT — ENCOUNTER SYMPTOMS
SINUS PRESSURE: 0
RESPIRATORY NEGATIVE: 1
VOMITING: 0
DIARRHEA: 0
COLOR CHANGE: 1
VOICE CHANGE: 0
ABDOMINAL DISTENTION: 1
TROUBLE SWALLOWING: 1
ABDOMINAL PAIN: 1
RHINORRHEA: 0
CONSTIPATION: 1
BACK PAIN: 1
NAUSEA: 1
ANAL BLEEDING: 0
SORE THROAT: 0
FACIAL SWELLING: 0
SINUS PAIN: 0
BLOOD IN STOOL: 0
RECTAL PAIN: 0
EYES NEGATIVE: 1

## 2021-10-02 ASSESSMENT — PAIN DESCRIPTION - DESCRIPTORS: DESCRIPTORS: ACHING

## 2021-10-02 ASSESSMENT — PAIN SCALES - GENERAL
PAINLEVEL_OUTOF10: 7
PAINLEVEL_OUTOF10: 9
PAINLEVEL_OUTOF10: 9
PAINLEVEL_OUTOF10: 7
PAINLEVEL_OUTOF10: 7
PAINLEVEL_OUTOF10: 8
PAINLEVEL_OUTOF10: 9
PAINLEVEL_OUTOF10: 8

## 2021-10-02 ASSESSMENT — PAIN DESCRIPTION - PAIN TYPE: TYPE: ACUTE PAIN;CHRONIC PAIN

## 2021-10-02 ASSESSMENT — PAIN DESCRIPTION - LOCATION: LOCATION: ABDOMEN

## 2021-10-02 ASSESSMENT — PAIN DESCRIPTION - FREQUENCY: FREQUENCY: CONTINUOUS

## 2021-10-02 NOTE — PROGRESS NOTES
Comprehensive Nutrition Assessment    Type and Reason for Visit:  Initial, Consult    Nutrition Recommendations/Plan: Continue Current Diet, Start Oral Nutrition Supplement , Start Parenteral Nutrition (Recommend change IVF to standard PN)    Nutrition Assessment:  Pt presents with severe malnutrition related to altered GI function, evidenced by poor intake pta, with N/D, and significant weight loss, and per NFPA. To provide clear supplement tid with meals. Suggest change IVF to standard PN for supplemental nutritional support. Malnutrition Assessment:  Malnutrition Status:  Severe malnutrition    Context:  Acute Illness     Findings of the 6 clinical characteristics of malnutrition:  Energy Intake:  7 - 50% or less of estimated energy requirements for 5 or more days  Weight Loss:  Unable to assess     Body Fat Loss:  7 - Moderate body fat loss Orbital, Buccal region   Muscle Mass Loss:  7 - Moderate muscle mass loss Temples (temporalis), Clavicles (pectoralis & deltoids)  Fluid Accumulation:  No significant fluid accumulation     Strength:  Not Performed    Estimated Daily Nutrient Needs:  Energy (kcal):  9744-1719 (kg x 33-36 for weight gain); Weight Used for Energy Requirements:  Admission     Protein (g):  50-66 (kg x 1.5-2.0); Weight Used for Protein Requirements:  Admission        Fluid (ml/day):  ~1200; Method Used for Fluid Requirements:  1 ml/kcal      Nutrition Related Findings:    PMH-fibromyalgia, htn, hld, RS; adm with N/D x 4 weeks; CT abdomen indicates colitis vs ileus. Pt is cachectic in appearance. IVF infusing at 50ml/hr. Labs (10/2): K-2.9 (KCl given 10/2), Mg-1.8, Gluc-97. Medications reviewed. Pt reports continued intermittent nausea (zofran ordered prn). She stated that she ate selective items over the past 4 weeks, with N/D and poor appetite. Pt agreeable to clear supplement.     Wounds:   (small scabbed area to right heel and redness with small tear to right ankle)       Current Nutrition Therapies:    ADULT DIET; Clear Liquid (sips)    Anthropometric Measures:  · Height: 5' 3\" (160 cm)  · Current Body Weight:   73 lb (33.1 kg) (bedscale)  · Admission Body Weight: 73 lb (33.1 kg) (bedscale)    · Usual Body Weight: 85 lb (38.6 kg) (stated; 90lb (?source 6/2018))     · Ideal Body Weight: 115 lbs; % Ideal Body Weight   63%   · BMI:  13   · BMI Categories: Underweight (BMI less than 22) age over 72       Nutrition Diagnosis:   · Severe malnutrition, In context of acute illness or injury related to inadequate protein-energy intake as evidenced by poor intake prior to admission, weight loss, intake 0-25%, moderate muscle loss, moderate loss of subcutaneous fat  · Altered GI function related to  (colitis vs ileus) as evidenced by weight loss, poor intake prior to admission, nausea, diarrhea    Nutrition Interventions:   Food and/or Nutrient Delivery:  Continue Current Diet, Start Oral Nutrition Supplement, Start Parenteral Nutrition (Recommend change IVF to standard PN (612 kcals/51gms protein))  Nutrition Education/Counseling:  Education not indicated   Coordination of Nutrition Care:  Continue to monitor while inpatient    Goals:  Intake >50% of meals with tolerance/advancement of diet. 100% of supplement. Weight gain.        Nutrition Monitoring and Evaluation:   Food/Nutrient Intake Outcomes:  Diet Advancement/Tolerance, Food and Nutrient Intake, Supplement Intake  Physical Signs/Symptoms Outcomes:  Weight, Biochemical Data, Nausea or Vomiting, Diarrhea     Electronically signed by Tracey Linn RD, LD on 10/2/21 at 2:49 PM EDT

## 2021-10-02 NOTE — CONSULTS
INITIAL CONSULT -PAIN MANAGEMENT     SERVICE DATE:  10/2/2021   SERVICE TIME:  5:27 PM  Admission date 10/1/2021  REASON FORCONSULT: Severe generalized pain  REQUESTING PHYSICIAN: Hospitalist team  Vandana Alanis MD    Chief complaint: Generalized widespread pain due to long history with rheumatoid arthritis with generalized joint deformity. HISTORY OF PRESENTILLNESS:  Ms. Sasha Purvis is a 78 y.o. female who presents for [facility] due to worsening GI symptoms, diagnosed with colitis. Patient has long history with complicated rheumatological disease, history of 47 years of rheumatoid arthritis, probably worsening end-stage joint deformity of affecting upper extremity joints hands and feet and ankle with difficulty walking, also acquired fusion for cervical spine. Patient has secondary fibromyalgia. She does see rheumatology as an outpatient, she has been monthly Enbrel therapy, patient came to the hospital complaining of severe intractable worsening GI complaint of nausea vomiting weakness diarrhea associated other electrolyte abnormalities with hypokalemia, patient states that she has been the symptoms for 10 days, generalized anemia, she has difficulty swallowing due to her rheumatological disease, she only does her p.o. in the fluid or liquid. Patient has been suffering of chronic pain, seen currently by her primary care who is planning to send her for pain management. She is on oxycodone/Percocet that been crushed in order for her to take it, also liquid Lyrica that is nonformulary in the hospital.  Patient is stated that the take Lyrica for fibromyalgia and the Percocet for pain but only last for 2 hours, she is always taking more than what she is prescribed because of the pain is intractable and not lasting with only 3 pills a day.     I came to interview the patient, she has her daughter bedside, she has her , I had a long discussion with the whole family about the current plan of pain management, including the limitation of treatment given her comorbidities    PAIN  ASSESSMENT:    constant, waxing and waning    aching, crushing, dull ache, pressure, sharp, shooting, throbbing, tight band, tingling, constant and nagging    pain is excruciating , incapacitating and unbearable (9-10 pain scale)    PAST MEDICAL HISTORY:    Past Medical History:   Diagnosis Date    Fibromyalgia     Fracture of pelvis ([address]     left    History of left hip replacement     History of total left knee replacement     History of total right knee replacement     Light sensitivity     bilateral eye    Osteoporosis     Rheumatoid arthritis ([address]     S[facility]ogrens syndrome [facility]      PAST SURGICAL HISTORY:    Past Surgical History:   Procedure Laterality Date    CATARACT REMOVAL WITH IMPLANT  01/2013    bilateral    CHOLECYSTECTOMY      OTHER SURGICAL HISTORY      bilateral eye implant    OVARY REMOVAL  06/2000    right    REVISION TOTAL HIP ARTHROPLASTY  10/2012    left    TOTAL HIP ARTHROPLASTY  04/1990    left    TOTAL KNEE ARTHROPLASTY  08/1997    right and left     FAMILY HISTORY:  No family history on file.   SOCIALHISTORY:    Social History     Socioeconomic History    Marital status:      Spouse name: Not on file    Number of children: Not on file    Years of education: Not on file    Highest education level: Not on file   Occupational History    Not on file   Tobacco Use    Smoking status: Never Smoker    Smokeless tobacco: Never Used   Vaping Use    Vaping Use: Never used   Substance and Sexual Activity    Alcohol use: Never    Drug use: Never    Sexual activity: Not Currently   Other Topics Concern    Not on file   Social History Narrative    Not on file     Social Determinants of Health     Financial Resource Strain: Low Risk     Difficulty of Paying Living Expenses: Not hard at all   Food Insecurity: No Food Insecurity    Worried About [facility] in the Last Year: Never true    Ran Out of Food in the Last Year: Never true   Transportation Needs:     Lack of Transportation (Medical):  Lack of Transportation (Non-Medical):    Physical Activity:     Days of Exercise per Week:     Minutes of Exercise per Session:    Stress:     Feeling of Stress :    Social Connections:     Frequency of Communication with Friends and Family:     Frequency of Social Gatherings with Friends and Family:     Attends L[employer] Services:     Active Member of Clubs or Organizations:     Attends Club or Organization Meetings:     Marital Status:    Intimate Partner Violence:     Fear of Current or Ex-Partner:     Emotionally Abused:     Physically Abused:     Sexually Abused:      PSYCHOLOGICAL HISTORY: Associated depression probably underlying chronic medical condition    MEDICATIONS:  Medications Prior to Admission: etanercept (ENBREL) 50 MG/ML injection, Inject into the skin once a week Takes monday  folic acid (FOLVITE) 1 MG tablet, TAKE 1 TABLET BY MOUTH ONCE DAILY  predniSONE (DELTASONE) 5 MG tablet, Take 5 mg by mouth daily  Calcium Carbonate-Vit D-Min (CALTRATE 600+D PLUS PO), Take by mouth daily  levothyroxine (SYNTHROID) 100 MCG tablet, TAKE 1 TABLET BY MOUTH ONCE DAILY  Lifitegrast (XIIDRA) 5 % SOLN, Apply 1 drop to eye 2 times daily  oxyCODONE-acetaminophen (PERCOCET) 7.5-325 MG per tablet, TAKE 1 2 (ONE HALF) TABLET BY MOUTH EVERY 4 HOURS AS NEEDED AND 1 AT BEDTIME AS NEEDED AS DIRECTED  zoledronic acid (RECLAST) 5 MG/100ML SOLN, Infuse 5 mg intravenously once Yearly  Multiple Vitamins-Minerals (CENTRUM ADULTS PO), Take by mouth  Carboxymethylcellul-Glycerin (REFRESH RELIEVA OP), Apply to eye  Pregabalin (LYRICA PO), Take 7 mLs by mouth nightly  iron sucrose (VENOFER) 20 MG/ML injection, Infuse 100 mg intravenously Q 3 months.   Had 1 month ago  [DISCONTINUED] Chlorpheniramine Maleate (CHLOR-TABLETS PO), Take 4 mg by mouth  naproxen (NAPROSYN) 500 MG tablet, TAKE 1 TABLET BY MOUTH TWICE DAILY AS NEEDED  [unfilled]    ALLERGIES:  Hydrocodone-acetaminophen, Hydroxychloroquine, Indomethacin, Amoxicillin, Lactose, and Other    COMPLETE REVIEW OF SYSTEMS:  As noted in HPI, 12 point ROS reviewed and otherwise negative. Review of Systems   Constitutional: Positive for activity change, appetite change, fatigue and unexpected weight change. Negative for chills, diaphoresis and fever. HENT: Positive for trouble swallowing. Negative for congestion, dental problem, drooling, ear discharge, ear pain, facial swelling, hearing loss, mouth sores, nosebleeds, postnasal drip, rhinorrhea, sinus pressure, sinus pain, sneezing, sore throat, tinnitus and voice change. Eyes: Negative. Respiratory: Negative. Cardiovascular: Negative. Gastrointestinal: Positive for abdominal distention, abdominal pain, constipation and nausea. Negative for anal bleeding, blood in stool, diarrhea, rectal pain and vomiting. Endocrine: Negative. Genitourinary: Negative. Musculoskeletal: Positive for arthralgias, back pain, gait problem, joint swelling, myalgias, neck pain and neck stiffness. Skin: Positive for color change and pallor. Negative for rash and wound. Allergic/Immunologic: Positive for immunocompromised state. Negative for environmental allergies and food allergies. Neurological: Positive for weakness and numbness. Negative for dizziness, seizures, syncope, facial asymmetry, speech difficulty, light-headedness and headaches. Hematological: Negative for adenopathy. Bruises/bleeds easily. Psychiatric/Behavioral: Positive for behavioral problems, decreased concentration, dysphoric mood and sleep disturbance. Negative for agitation, confusion, hallucinations, self-injury and suicidal ideas. The patient is nervous/anxious. The patient is not hyperactive.           OBJECTIVE  PHYSICAL EXAM:  BP (!) 95/50 Comment: rn notified  Pulse 64   Temp 99.1 °F (37.3 °C)   Resp 17   Ht 5' 3\" (1.6 m)   Wt 73 lb 6.6 oz (33.3 kg)   SpO2 100%   BMI 13.00 kg/m²   Body mass index is 13 kg/m². CONSTITUTIONAL: Very weak lethargic low muscle mass,. Thin low body build but she is awake alert answering question appropriately. Mild distress due to the pain is noticed. EYES:  vision intact  ENT:  normocepalic, without obvious abnormality, atraumatic  NECK: Very stiff neck, limited range of motion due to rheumatological disease  BACK: Kyphotic, very low muscle mass, generalized tenderness  LUNGS:  no increased work of breathing  CARDIOVASCULAR:  regular rate and rhythm  ABDOMEN: Soft and nondistended  MUSCULOSKELETAL: Severe deformity involving all her joints especially with bilateral elbow, bilateral wrist, all hand joints, bilateral deviation and frozen ankles, bilateral knees is the least affected, replaced knees and hips, flexion deformities, low muscle mass and slight weakness  NEUROLOGIC: Mental status seem to be intact, decreased muscle mass associated with weakness, symmetrical, 4+/5, sensory is symmetrical  SKIN: Very sensitive, frail skin    DATA:   Diagnostic tests reviewed for today's visit:    All labs and imaging results reviewed.      Lab Results   Component Value Date    WBC 11.1 10/02/2021    HGB 9.3 10/02/2021    HCT 29.6 10/02/2021    MCV 84.6 10/02/2021     10/02/2021     10/02/2021    K 2.9 10/02/2021     10/02/2021    CO2 23 10/02/2021    BUN 7 10/02/2021    CREATININE 0.37 10/02/2021    CALCIUM 7.3 10/02/2021    ALKPHOS 57 10/01/2021    ALT <5 10/01/2021    AST 29 10/01/2021    BILITOT 0.3 10/01/2021    LABALBU 3.1 10/01/2021     No results found for: [address], BARBSCNU, LABBENZ, CANSU, COCAIMETSCRU, OPIATESCREENURINE, PHENCYCLIDINESCREENURINE, LABMETH, PROPOX, OXYCODONEUR, DSCOMMENT  CT ABDOMEN PELVIS W IV CONTRAST Additional Contrast? None    Result Date: 10/1/2021  Comparison: No prior History: Abdominal pain, nausea vomiting and diarrhea  Technique: CT ABDOMEN PELVIS obstruction is not excluded. 2. Thick walled loops of large bowel are seen versus incomplete distention that may reflect colitis. 3. There is free fluid seen in the abdomen and pelvis and the liver is decreased in attenuation. The pancreas is atrophic. Sclerosis is also a consideration. 4. Severe intervertebral disc space narrowing is seen at L4-5 and the L5 vertebral body is displaced posteriorly and appears to be chronically position with sclerosis visualized. All CT scans at this facility use dose modulation, iterative reconstruction, and/or weight based dosing         ASSESSMENT & PLAN  Active Hospital Problems    Diagnosis Date Noted    Generalized muscle ache [M79.10] 10/02/2021    Long-term current use of opiate analgesic [Z79.891] 10/02/2021    Acquired deformity joint foot [M21.969] 10/02/2021    Acquired deformity of hand [M21.949] 10/02/2021    Long term (current) use of systemic steroids [Z79.52] 10/02/2021    Muscle weakness (generalized) [M62.81] 10/02/2021    Abnormality of gait [R26.9] 10/02/2021    Difficulty swallowing [R13.10] 10/02/2021    Fibromyalgia, secondary [M79.7] 10/02/2021    Colitis [K52.9] 10/01/2021    Chronic pain [G89.29] 11/07/2018    Rheu arthritis w rheu factor mult site w/o org/sys involv ([facility] [M05.79] 11/07/2018    Secondary Sjogren's syndrome ([facility] [M35.00] 02/21/2012     78years old female with very complex presentation with advanced end-stage rheumatoid arthritis/drug current and complex rheumatological condition with fibromyalgia on top, end-stage joint deformity, failure to physically thrive, difficulty walking, joint deformity with hard to hold objects and severe sensitive frail skin, generalized pain with high opioid tolerance, limitation with low muscle mass, frail body.     RECOMMENDATION:  SEE ORDERS    Will start Duragesic patch 12 mcg trial    Keep the Percocet for breakthrough pain    In here in the hospital unfortunately did not have liquid Lyrica and she cannot swallow pills but we would be trying alternative gabapentin that she can try 125 mg twice daily. At home I instructed the family that the Lyrica liquid that she takes 20 mg/mL concentration we 1 mL 3 times daily and 2 mL at bedtime which can give total dose of 100 mg Lyrica daily. I discussed also other alternative measures for chronic pain including CBD oil application as she has tried oral CBD and cause some GI aspect/side effect probably due to her sensitive GI system    I also discussed alternative will be the Belbuca to apply to the buccal mucosa which can be used if failed on fentanyl patch but the issue with dry mouth will be a problem for her but this can be managed. Regarding the Percocet, to continue the current dose of the Percocet but probably the frequency need to be increased every 4 hours at home      Palliative care would be a good idea as an outpatient and should be discussed with patient and family.     SIGNATURE: Jeremy Almodovar MD PATIENT NAME: Luis Alberto Cohen   DATE: October 2, 2021 MRN: [id number]   TIME: 5:27 PM PAGER/CONTACT #: [phone]

## 2021-10-02 NOTE — CONSULTS
Consult to Gastroenterology  Consult performed by: Lilly Bella MD  Consult ordered by: ROBIN Valerio NP      Patient Name: Marc Garcia Date: 10/1/2021 10:40 AM  MR #: [id number]  : 1955    Attending Physician: Christian Bundy DO  Reason for consult: Enteritis  History Obtained From:  patient, spouse, electronic medical record, staff nurse  History of Presenting Illness:      Karishma Del Real is a 78 y.o. female on hospital day 1 with PMH severe rheumatoid arthritis, fibromyalgia, osteoporosis, surgeon syndrome, HTN, admitted with a history of nausea, vomiting, generalized weakness, diarrhea, decreased appetite for approximately 4 weeks. GI consulted for enteritis. Patient and spouse, Julian Brandt, report onset of nausea/vomiting, generalized abdominal pain, and diarrhea that began around 4 weeks ago. Denies recent antibiotic use, travel, or sick contacts. Denies hematemesis, hematochezia, melena or fevers. Her  does endorse around 1 year ago they both became ill w/nausea/vomiting/diarrhea after eating an egg sandwich that was not cooked properly at a fast food restaurant. State he recovered in a couple of weeks, however her symptoms persistent for at least a month or 2 and she continues to have diarrhea off and on since then. Since admission to the hospital, patient endorses nausea, no vomiting, generalized pain all over, and left sided abdominal pain. She has not had a bowel movement since admission. Nonsmoker, no alcohol or illicit drug use. Of note, naproxen, prednisone, and percocet on home medications. History of iron deficiency anemia, receives iron infusions through , coordinated by her rheumatologist.   No prior history of EGD or colonoscopy. On admit, WBCs 14.6, Hgb 10.9, MCV 84.8, platelets 089, BUN 8, creatinine 0.33. Today, WBCs 11.1, Hgb 9.3, HCT 29.6, MCV 84.6, platelets 764.     History:      Past Medical History:   Diagnosis Date    Fibromyalgia     Fracture of pelvis ([facility]     left    History of left hip replacement     History of total left knee replacement     History of total right knee replacement     Light sensitivity     bilateral eye    Osteoporosis     Rheumatoid arthritis ([facility]     Sjogrens syndrome ([facility] [address] [facility]      Past Surgical History:   Procedure Laterality Date    CATARACT REMOVAL WITH IMPLANT  01/2013    bilateral    CHOLECYSTECTOMY      OTHER SURGICAL HISTORY      bilateral eye implant    OVARY REMOVAL  06/2000    right    REVISION TOTAL HIP ARTHROPLASTY  10/2012    left    TOTAL HIP ARTHROPLASTY  04/1990    left    TOTAL KNEE ARTHROPLASTY  08/1997    right and left     Family History  No family history on file. [] Unable to obtain due to ventilated and/ or neurologic status  Social History     Socioeconomic History    Marital status:      Spouse name: Not on file    Number of children: Not on file    Years of education: Not on file    Highest education level: Not on file   Occupational History    Not on file   Tobacco Use    Smoking status: Never Smoker    Smokeless tobacco: Never Used   Vaping Use    Vaping Use: Never used   Substance and Sexual Activity    Alcohol use: Never    Drug use: Never    Sexual activity: Not Currently   Other Topics Concern    Not on file   Social History Narrative    Not on file     Social Determinants of Health     Financial Resource Strain: Low Risk     Difficulty of Paying Living Expenses: Not hard at all   Food Insecurity: No Food Insecurity    Worried About [facility] in the Last Year: Never true    [facility] in the Last Year: Never true   Transportation Needs:     Lack of Transportation (Medical):      Lack of Transportation (Non-Medical):    Physical Activity:     Days of Exercise per Week:     Minutes of Exercise per Session:    Stress:     Feeling of Stress :    Social Connections:     Frequency of Communication with Friends and Family:     Frequency of Social Gatherings with Friends and Family:     Attends Yarsani Services:     Active Member of Clubs or Organizations:     Attends Club or Organization Meetings:     Marital Status:    Intimate Partner Violence:     Fear of Current or Ex-Partner:     Emotionally Abused:     Physically Abused:     Sexually Abused:       [] Unable to obtain due to ventilated and/ or neurologic status    Home Medications:      Medications Prior to Admission: etanercept (ENBREL) 50 MG/ML injection, Inject into the skin once a week Takes monday  folic acid (FOLVITE) 1 MG tablet, TAKE 1 TABLET BY MOUTH ONCE DAILY  predniSONE (DELTASONE) 5 MG tablet, Take 5 mg by mouth daily  Calcium Carbonate-Vit D-Min (CALTRATE 600+D PLUS PO), Take by mouth daily  levothyroxine (SYNTHROID) 100 MCG tablet, TAKE 1 TABLET BY MOUTH ONCE DAILY  Lifitegrast (XIIDRA) 5 % SOLN, Apply 1 drop to eye 2 times daily  oxyCODONE-acetaminophen (PERCOCET) 7.5-325 MG per tablet, TAKE 1 2 (ONE HALF) TABLET BY MOUTH EVERY 4 HOURS AS NEEDED AND 1 AT BEDTIME AS NEEDED AS DIRECTED  zoledronic acid (RECLAST) 5 MG/100ML SOLN, Infuse 5 mg intravenously once Yearly  Multiple Vitamins-Minerals (CENTRUM ADULTS PO), Take by mouth  Carboxymethylcellul-Glycerin (REFRESH RELIEVA OP), Apply to eye  Pregabalin (LYRICA PO), Take 7 mLs by mouth nightly  iron sucrose (VENOFER) 20 MG/ML injection, Infuse 100 mg intravenously Q 3 months.   Had 1 month ago  [DISCONTINUED] Chlorpheniramine Maleate (CHLOR-TABLETS PO), Take 4 mg by mouth  naproxen (NAPROSYN) 500 MG tablet, TAKE 1 TABLET BY MOUTH TWICE DAILY AS NEEDED    Current Hospital Medications:   Scheduled Meds:   folic acid  1 mg Oral Daily    levothyroxine  100 mcg Oral Daily    lidocaine  1 patch TransDERmal Daily    predniSONE  5 mg Oral Daily    sodium chloride flush  10 mL IntraVENous BID    sodium chloride flush  5-40 mL IntraVENous 2 times per day    metroNIDAZOLE  500 mg IntraVENous Q8H    ciprofloxacin  400 mg IntraVENous Q12H    pantoprazole  40 mg IntraVENous Daily    And    sodium chloride (PF)  10 mL IntraVENous Daily     Continuous Infusions:   sodium chloride      lactated ringers 50 mL/hr at 10/01/21 2045     PRN Meds:.oxyCODONE-acetaminophen, sodium chloride flush, sodium chloride, ondansetron **OR** ondansetron, acetaminophen **OR** acetaminophen, polyethylene glycol, HYDROmorphone   sodium chloride      lactated ringers 50 mL/hr at 10/01/21 2045      Allergies: Allergies   Allergen Reactions    Hydrocodone-Acetaminophen Other (See Comments)     Dizzy, Nausea.  Hydroxychloroquine Other (See Comments)     Nausea, stomach upset    Indomethacin Other (See Comments)     Dizziness, nausea, rash and vomiting    Amoxicillin Diarrhea and Nausea And Vomiting    Lactose Nausea And Vomiting    Other      \"Cheap metals\"  rash      Review of Systems:       [x] CV, Resp, Neuro, , and all other systems reviewed and negative other than listed in HPI. Objective Findings:     Vitals:   Vitals:    10/01/21 2130 10/01/21 2134 10/02/21 0405 10/02/21 0727   BP:  (!) 113/50 (!) 128/59 (!) 95/50   Pulse:  66 81 64   Resp:  20 19 17   Temp:  99.2 °F (37.3 °C)  99.1 °F (37.3 °C)   TempSrc:  Oral     SpO2:  97% 98% 100%   Weight: 73 lb 6.6 oz (33.3 kg)      Height:            Physical Examination:  General: Alert, oriented, in no acute distress, appears severely malnourished, thin/frail  HEENT: Normocephalic, no scleral icterus. Neck: No JVD. Heart: Regular, no murmur, no rub/gallop. No RV heave. Lungs: Clear to ascultation, no rales/wheezing/rhonchi. Good chest wall excursion. Abdomen: Appearance: No distension, Soft , + generalized tenderness, Bowel sounds normal  Extremities: No clubbing/cyanosis, no edema, RA deformities present in bilateral upper and lower extremities  Skin: Warm, dry, normal turgor, no rash, no bruise, no petichiae. Neuro: No myoclonus or tremor.    Psych: Normal affect    Results/ Medications reviewed 10/2/2021, 10:47 AM     Laboratory, Microbiology, Pathology, Radiology, Cardiology, Medications and Transcriptions reviewed  Scheduled Meds:   folic acid  1 mg Oral Daily    levothyroxine  100 mcg Oral Daily    lidocaine  1 patch TransDERmal Daily    predniSONE  5 mg Oral Daily    sodium chloride flush  10 mL IntraVENous BID    sodium chloride flush  5-40 mL IntraVENous 2 times per day    metroNIDAZOLE  500 mg IntraVENous Q8H    ciprofloxacin  400 mg IntraVENous Q12H    pantoprazole  40 mg IntraVENous Daily    And    sodium chloride (PF)  10 mL IntraVENous Daily     Continuous Infusions:   sodium chloride      lactated ringers 50 mL/hr at 10/01/21 2045       Recent Labs     10/01/21  1336 10/02/21  0635   WBC 14.6* 11.1*   HGB 10.9* 9.3*   HCT 35.4* 29.6*   MCV 84.8 84.6   * 296     Recent Labs     10/01/21  1230 10/01/21  1336   NA  --  135   K  --  4.1   CL  --  98   CO2  --  20   BUN  --  8   CREATININE 0.2* 0.33*     Recent Labs     10/01/21  1336   AST 29   ALT <5   BILITOT 0.3   ALKPHOS 57     Recent Labs     10/01/21  1336   LIPASE 16     Recent Labs     10/01/21  1336   PROT 6.0*     CT ABDOMEN PELVIS W IV CONTRAST Additional Contrast? None    Result Date: 10/1/2021  Comparison: No prior History: Abdominal pain, nausea vomiting and diarrhea  Technique: CT ABDOMEN PELVIS W IV CONTRAST Helically Acquired CT of the  abdomen and pelvis was performed during the intravenous infusion of 100 mL of Isovue-370. Axial delayed images were also performed. Reformatted sagittal and coronal images were also  obtained. Findings: The visualized bases of the lungs contain no consolidating pneumonia, pleural effusion or pneumothorax. There are scattered opacities seen in the visualized right lower lobe with the largest measuring 6 mm. In the posterior medial aspect of the  right lower lobe there are groundglass opacities. In the lingula there is atelectasis versus infiltrate.  The gallbladder is surgically absent. Free fluid is seen in the abdomen and pelvis. Bowel wall thickening is seen in the colon. There are fluid-filled loops of small bowel visualized. In the right side of the pelvis there are cysts sutures seen. In this area narrowed loops of bowel are seen. The urinary bladder is very distended there is free fluid seen in the posterior pelvis. The pancreas is atrophic. Mild intrahepatic ductal dilation is seen but is thought likely due to cholecystectomy. The liver is decreased in attenuation. The spleen is unremarkable. No focal masses identified in the adrenal glands. Bilaterally both kidneys show uptake and excretion of contrast with no evidence for hydronephrosis or renal calculi. Diffuse atherosclerotic calcifications are seen in the abdomen and pelvis. A total hip prosthetic is seen on the left. Severe intervertebral disc space narrowing is seen at L4-5. Anterolisthesis of L4 on L5 is seen. The L5 vertebral body is posterior in relationship to L4 and protrudes posteriorly into the spinal canal. This appears to be chronic. Impression: 1. Multiple fluid-filled loops of small bowel are seen that could represent ileus. . The cerebellum measure up to 2.5 cm in greatest transverse diameter. In the right lower pelvis there are narrowed loops of bowel visualized and developing small bowel obstruction is not excluded. 2. Thick walled loops of large bowel are seen versus incomplete distention that may reflect colitis. 3. There is free fluid seen in the abdomen and pelvis and the liver is decreased in attenuation. The pancreas is atrophic. Sclerosis is also a consideration. 4. Severe intervertebral disc space narrowing is seen at L4-5 and the L5 vertebral body is displaced posteriorly and appears to be chronically position with sclerosis visualized.   All CT scans at this facility use dose modulation, iterative reconstruction, and/or weight based dosing     Impression:   78 y.o. female with PMH severe rheumatoid arthritis, fibromyalgia, osteoporosis, surgeon syndrome, HTN, iron deficiency anemia (received iron infusions @  though rheumatology), admitted with a history of nausea, vomiting, generalized weakness, and diarrhea for approximately 4 weeks. Patient's symptoms seemed to be resolving 9 days ago, however they returned so she presented to the ER. Also, with history of similar symptoms after eating undercooked food 1 year ago, without full resolution of symptoms since.  -Wide differential for patient's symptoms including acute gastroenteritis (viral or bacterial) or ?postinfectious IBS. Ileus and SBO in differential as well. On admit, WBCs 14.6, Hgb 10.9, MCV 84.8, platelets 311, BUN 8, creatinine 0.33. Today, WBCs 11.1, Hgb 9.3, HCT 29.6, MCV 84.6, platelets 898. Plan:   -Observe clinical course, medical management per primary team  - Await stool studies  -Recommend limiting opioid use as much as possible as CT suggest possible ileus or SBO. -Recommend nutrition consult in the setting of severely low BMI. -Recommend starting Miralax daily to flush out the colon  -Recommend probiotic daily    Comments: Thank you for allowing us to participate in the care of this patient. Will continue to follow. Please call if questions or concerns arise. Electronically signed by Adeline Bustamante MD on 10/2/2021 at 10:47 AM    Please note this report has been partially produced using speech recognition software and may cause contain errors related to that system including grammar, punctuation and spelling as well as words and phrases that may seem inappropriate. If there are questions or concerns please feel free to contact me to clarify.

## 2021-10-02 NOTE — PROGRESS NOTES
Pt arrived to floor and was oriented to room. Complaining of pain and nausea. No emesis noted. Pt is alert and oriented. Unable to push call light, will attempt to locate and light touch call light. 2240:  Avisis placed in room for safety as pt has difficulty with pressing the call light. 2325:  Pt medicated with dilaudid and zofran. No emesis noted, but pt reports nausea. States pain is to abd as well as all over. Pt is alert and oriented. Lungs clear. BS active. Assisted with positioning as pt has limited movement. Pt with RA and has deformities to bilateral hand. Pt noted to have small scabbed area to right heel and redness with small tear to right ankle. Mepilex placed. Assisted with bed pain. Urine yellow and clear. Call light in reach. Bed alarm on.

## 2021-10-02 NOTE — PROGRESS NOTES
Physician Progress Note    10/2/2021   11:40 AM    Name:  Cristian Mathews  MRN:    [id number]     IP Day: 1     Admit Date: 10/1/2021 10:40 AM  PCP: Solomon Maria MD    Code Status:  DNR-CCA      Assessment and Plan: Active Problems/ diagnosis:     Non-intractable nausea and vomiting in the setting of colitis versus ileus. CT the abdomen completed there are multiple fluid-filled loops of small bowel that could represent ileus. Possible developing small bowel obstruction is not excluded, possible colitis free fluid seen in the abdomen and pelvis. Clear liquids only. Antiemetics, IV antibiotics, IV PPI, IV fluids, GI consult, stool cultures, stool for C. difficile. Further recommendations per GI     Lactic acidosis in the setting of decreased intake: Continue IV fluids, repeat lactic in 2 hours.     Severe protein calorie malnutrition: Consult dietitian for further recommendations. BMI 14.17. Thin and frail in appearance. Muscle wasting apparent. Check PAB     Iron deficiency anemia: H&H stable. Follow labs. Receives IV infusions outpatient.     Chronic pain in the setting of severe RA: Consult pain management. Dilaudid every 4 hours as needed. Review and resume home medications once verified.      Hypothyroidism: Follow-up with PCP outpatient      GI prophylaxis as on long-term steroids.     Advanced care planning: [facility]- would benefit from palliative care     Debility:  is primary care taker. Patient minimally ambulatory. Severely malnourished. Generalized weakness. PT/OT consults.         Plan of care discussed with: patient and family    Subjective:      no new events.      Physical Examination:      Vitals:  BP (!) 95/50 Comment: rn notified  Pulse 64   Temp 99.1 °F (37.3 °C)   Resp 17   Ht 5' 3\" (1.6 m)   Wt 73 lb 6.6 oz (33.3 kg)   SpO2 100%   BMI 13.00 kg/m²   Temp (24hrs), Av.2 °F (37.3 °C), Min:99.1 °F (37.3 °C), Max:99.2 °F (37.3 °C)      General appearance: alert,

## 2021-10-02 NOTE — CARE COORDINATION
MET WITH PATIENT AND SPOUSE. [address] [facility] Case Management Initial Discharge Assessment    Met with Patient and Family to discuss discharge plan. PCP: Corina Ramirez MD                                Date of Last Visit: 10 DAYS AGO    If no PCP, list provided? Declined    Discharge Planning    Living Arrangements: at home dependent on family care    Who do you live with? SPOUSE    Who helps you with your care:  family or spouse (FULLTIME CAREGIVER IS THERE 24/7)    If lives at home:     Do you have any barriers navigating in your home? no    Patient can perform ADL? Yes--WITH SPOUSE ASSIST    Current Services (outpatient and in home) :  None    Dialysis: No    Is transportation available to get to your appointments? Yes--SPOUSE    DME Equipment:  yes - HAS ALL EQUIPMENT--SHOWER CHAIR/BSC, W/C, WALKER, CANE     Respiratory equipment: None    Respiratory provider:  no     Pharmacy:  yes - [place] with Medication Assistance Program?  No      [facility] and Declined    Patient agreeable to SNF/Rehab? No and Declined    Other discharge needs identified? N/A    Does Patient Have a High-Risk for Readmission Diagnosis (CHF, PN, MI, COPD)? No    Initial Discharge Plan? (Note: please see concurrent daily documentation for any updates after initial note). RETURN HOME W/ SPOUSE. SPOUSE IS CAREGIVER. DENIES ANY DME NEEDS OR THERAPY NEEDS.      Readmission Risk              Risk of Unplanned Readmission:  10         Electronically signed by Aline Jordan RN on 10/2/2021 at 12:20 PM

## 2021-10-02 NOTE — PLAN OF CARE
Nutrition Problem #1: Severe malnutrition, In context of acute illness or injury  Intervention: Food and/or Nutrient Delivery: Continue Current Diet, Start Oral Nutrition Supplement, Start Parenteral Nutrition   Nutritional Goals: Intake >50% of meals with tolerance/advancement of diet. 100% of supplement. Weight gain.

## 2021-10-02 NOTE — PROGRESS NOTES
Physical Therapy Med Surg Initial Assessment  Fac[facility]/Department: [facility]  [facility]       NAME: Vilma Wetzel  : 1955 (35 y.o.)  MRN: [id number]  CODE STATUS: DNR-CCA    Date of Service: 10/2/2021    Patient Diagnosis(es): Lactic acidosis [E87.2]  Colitis [K52.9]  Diarrhea, unspecified type [R19.7]  Non-intractable vomiting with nausea, unspecified vomiting type [R11.2]   Chief Complaint   Patient presents with    Abdominal Pain     n/v     Patient Active Problem List    Diagnosis Date Noted    Colitis 10/01/2021    Dry eye syndrome, bilateral 2020    Rheu arthritis w rheu factor mult site w/o org/sys involv ([address] 2018    Chronic pain disorder 2018    Anemia, unspecified 10/16/2018    Hypothyroidism, unspecified 10/16/2018    Age-related osteoporosis without current pathological fracture 2018    Secondary Sjogren's syndrome ([address] 2012    Steroid long-term use 2012        Past Medical History:   Diagnosis Date    Fibromyalgia     Fracture of pelvis ([address]     left    History of left hip replacement     History of total left knee replacement     History of total right knee replacement     Light sensitivity     bilateral eye    Osteoporosis     Rheumatoid arthritis ([address]     Sjogrens syndrome ([address]      Past Surgical History:   Procedure Laterality Date    CATARACT REMOVAL WITH IMPLANT  2013    bilateral    CHOLECYSTECTOMY      OTHER SURGICAL HISTORY      bilateral eye implant    OVARY REMOVAL  2000    right    REVISION TOTAL HIP ARTHROPLASTY  10/2012    left    TOTAL HIP ARTHROPLASTY  1990    left    TOTAL KNEE ARTHROPLASTY  1997    right and left       Chart Reviewed: Yes  Patient assessed for rehabilitation services?: Yes  Family / Caregiver Present: No  General Comment  Comments: Pt laying in bed, agreeable to PTeval    Restrictions:  Restrictions/Precautions: Fall Risk (high fall risk per Alfaro score) SUBJECTIVE: Subjective: Pt reports pain mildly improving.  States pain was 10/10 and now 7 out of 10    Pain  Pre Treatment Pain Screening  Pain at present: 7  Scale Used: Numeric Score  Intervention List: Patient able to continue with treatment;Nurse/physician notified  Comments / Details: pt reports chronic joint pain with RA and acute abdominal pain    Post Treatment Pain Screening:   Pain Screening  Patient Currently in Pain: Yes  Pain Assessment  Pain Assessment: 0-10  Pain Level: 7  Pain Type: Acute pain;Chronic pain  Pain Location: Abdomen  Pain Descriptors: Aching  Pain Frequency: Continuous    Prior Level of Function:  Social/Functional History  Lives With: Spouse  Type of Home: House  Home Layout: One level  Home Access: Level entry  Bathroom Shower/Tub: Walk-in shower  Bathroom Equipment: Shower chair, Grab bars in shower, 3-in-1 commode, Toilet raiser  Bathroom Accessibility: Wheelchair accessible  Home Equipment: Rolling walker, Lift chair  ADL Assistance: Needs assistance (~month ago pt has been needing total assist)  Homemaking Assistance: Needs assistance  Homemaking Responsibilities: No  Ambulation Assistance: Independent (with 2ww)  Transfer Assistance: Independent  Active : No    OBJECTIVE:   Vision: Within Functional Limits  Hearing: Exceptions to [facility]  Hearing Exceptions: Hard of hearing/hearing concerns    Cognition:  Overall Orientation Status: Within Normal Limits  Follows Commands: Within Functional Limits    Observation/Palpation  Posture: Fair  Observation: significant joint deformities in hands/wrists bilaterally, pleasant, cooperative, no acute distress noted on RA    ROM:  RLE General PROM: no ankle movement-pt reports ankles \"fused\", impaired hip flexor, HS mm length  RLE General AROM: hip lacking ~15-20 degrees extension, knee lacking ~20degrees ext, flex to ~100 degrees  LLE General PROM: no ankle movement-pt reports ankles \"fused\", impaired hip flexor, HS mm length  LLE General AROM: hip lacking ~15-20 degrees extension, knee lacking ~20degrees ext, flex to ~100 degrees    Strength:  Strength RLE  Comment: observed 3/5 at hip, knee; no observed ankle movement  Strength LLE  Comment: observed 3/5 at hip, knee; no observed ankle movement    Neuro:  Balance  Sitting - Static:  (pt declined)     Tone RLE  RLE Tone: Normotonic  Tone LLE  LLE Tone: Normotonic  Motor Control  Gross Motor?: Exceptions  Comments: generalized weakness/debility  Sensation  Overall Sensation Status: WNL    Bed mobility  Comment: pt declined all mobility d/t pain reports, declined repositioning, Pt ed in Q2 hr turns and positining with pillows to decrease risk for skin breakdown or further joint contractures    Transfers  Sit to Stand: Unable to assess (pt declined)  Stand to sit: Unable to assess    Ambulation  Ambulation? :  (pt declined)    Activity Tolerance  Activity Tolerance: Patient limited by pain      PT Education  PT Education: PT Role;General Safety;Goals;Plan of Care  Patient Education: benefits of early mobility    ASSESSMENT:   Body structures, Functions, Activity limitations: Decreased functional mobility ; Decreased strength;Decreased balance;Decreased ROM; Decreased posture; Increased pain  Decision Making: Medium Complexity  History: high  Exam: high  Clinical Presentation: med    Prognosis: Good  Patient Education: benefits of early mobility  Barriers to Learning: none    DISCHARGE RECOMMENDATIONS:  Discharge Recommendations: Continue to assess pending progress, Patient would benefit from continued therapy after discharge    Assessment: Pt demonstrates the above deficits and decline in functional mobility status placing them at increased risk for falls. Pt reports steady decline in mobility and ADLs over the last month. Exam limited to bed d/t pt declining to participate in edge of bed activity.  Pt would benefit from physical therapy to address above deficits and allow for safe return home at highest level of function, decrease risk for falls, and improve QOL. REQUIRES PT FOLLOW UP: Yes      PLAN OF CARE:  Plan  Times per week: 3-6  Current Treatment Recommendations: Strengthening, Functional Mobility Training, Neuromuscular Re-education, Home Exercise Program, Equipment Evaluation, Education, & procurement, Safety Education & Training, Modalities, Gait Training, Transfer Training, Balance Training, Stair training, Patient/Caregiver Education & Training, Positioning, ROM, Pain Management, Wheelchair Mobility Training  Safety Devices  Type of devices: Left in bed, Call light within reach, Bed alarm in place, Telesitter in use    Goals:  Patient goals : \"to feel better'  Long term goals  Long term goal 1: Bed mobillity with SBA  Long term goal 2: unsupported sitting x8 min with supervision  Long term goal 3: progress to standing EOB with AD and min A  Long term goal 4: Functional transfers with Min A to promote OOB activity    [facility] ([address] [place] [address]) Denis 95 Raw Score : 8     Therapy Time:   Individual   Time In 0925   Time Out 0940   Minutes [address] [place] [address], 10/02/21 at 9:47 AM         Definitions for assistance levels  Independent = pt does not require any physical supervision or assistance from another person for activity completion. Device may be needed.   Stand by assistance = pt requires verbal cues or instructions from another person, close to but not touching, to perform the activity  Minimal assistance= pt performs 75% or more of the activity; assistance is required to complete the activity  Moderate assistance= pt performs 50% of the activity; assistance is required to complete the activity  Maximal assistance = pt performs 25% of the activity; assistance is required to complete the activity  Dependent = pt requires total physical assistance to accomplish the task

## 2021-10-03 LAB — LACTIC ACID: 0.8 MMOL/L (ref 0.5–2.2)

## 2021-10-03 PROCEDURE — 83605 ASSAY OF LACTIC ACID: CPT

## 2021-10-03 PROCEDURE — 36415 COLL VENOUS BLD VENIPUNCTURE: CPT

## 2021-10-03 PROCEDURE — 6370000000 HC RX 637 (ALT 250 FOR IP): Performed by: NURSE PRACTITIONER

## 2021-10-03 PROCEDURE — 2580000003 HC RX 258: Performed by: NURSE PRACTITIONER

## 2021-10-03 PROCEDURE — 6360000002 HC RX W HCPCS: Performed by: NURSE PRACTITIONER

## 2021-10-03 PROCEDURE — 6370000000 HC RX 637 (ALT 250 FOR IP): Performed by: INTERNAL MEDICINE

## 2021-10-03 PROCEDURE — 6360000002 HC RX W HCPCS: Performed by: INTERNAL MEDICINE

## 2021-10-03 PROCEDURE — 6370000000 HC RX 637 (ALT 250 FOR IP): Performed by: ANESTHESIOLOGY

## 2021-10-03 PROCEDURE — 1210000000 HC MED SURG R&B

## 2021-10-03 RX ORDER — PANTOPRAZOLE SODIUM 40 MG/1
40 TABLET, DELAYED RELEASE ORAL
Status: DISCONTINUED | OUTPATIENT
Start: 2021-10-04 | End: 2021-10-04 | Stop reason: HOSPADM

## 2021-10-03 RX ORDER — METRONIDAZOLE 500 MG/1
500 TABLET ORAL EVERY 8 HOURS SCHEDULED
Status: DISCONTINUED | OUTPATIENT
Start: 2021-10-03 | End: 2021-10-04 | Stop reason: HOSPADM

## 2021-10-03 RX ORDER — CIPROFLOXACIN 500 MG/1
500 TABLET, FILM COATED ORAL EVERY 12 HOURS SCHEDULED
Status: DISCONTINUED | OUTPATIENT
Start: 2021-10-03 | End: 2021-10-04 | Stop reason: HOSPADM

## 2021-10-03 RX ORDER — METOCLOPRAMIDE HYDROCHLORIDE 5 MG/ML
10 INJECTION INTRAMUSCULAR; INTRAVENOUS EVERY 6 HOURS
Status: DISCONTINUED | OUTPATIENT
Start: 2021-10-03 | End: 2021-10-04 | Stop reason: HOSPADM

## 2021-10-03 RX ADMIN — SODIUM CHLORIDE, PRESERVATIVE FREE 10 ML: 5 INJECTION INTRAVENOUS at 21:06

## 2021-10-03 RX ADMIN — METRONIDAZOLE 500 MG: 500 TABLET ORAL at 23:49

## 2021-10-03 RX ADMIN — OXYCODONE AND ACETAMINOPHEN 1 TABLET: 7.5; 325 TABLET ORAL at 17:02

## 2021-10-03 RX ADMIN — PREDNISONE 5 MG: 5 TABLET ORAL at 10:23

## 2021-10-03 RX ADMIN — ONDANSETRON 4 MG: 2 INJECTION INTRAMUSCULAR; INTRAVENOUS at 04:14

## 2021-10-03 RX ADMIN — METRONIDAZOLE 500 MG: 500 TABLET ORAL at 14:32

## 2021-10-03 RX ADMIN — LEVOTHYROXINE SODIUM 100 MCG: 0.1 TABLET ORAL at 10:23

## 2021-10-03 RX ADMIN — METOCLOPRAMIDE 10 MG: 5 INJECTION, SOLUTION INTRAMUSCULAR; INTRAVENOUS at 12:02

## 2021-10-03 RX ADMIN — OXYCODONE AND ACETAMINOPHEN 1 TABLET: 7.5; 325 TABLET ORAL at 01:30

## 2021-10-03 RX ADMIN — METOCLOPRAMIDE 10 MG: 5 INJECTION, SOLUTION INTRAMUSCULAR; INTRAVENOUS at 23:45

## 2021-10-03 RX ADMIN — SODIUM CHLORIDE, PRESERVATIVE FREE 10 ML: 5 INJECTION INTRAVENOUS at 10:24

## 2021-10-03 RX ADMIN — OXYCODONE AND ACETAMINOPHEN 1 TABLET: 7.5; 325 TABLET ORAL at 07:39

## 2021-10-03 RX ADMIN — GABAPENTIN 125 MG: 250 SOLUTION ORAL at 21:05

## 2021-10-03 RX ADMIN — TROLAMINE SALICYLATE: 100 CREAM TOPICAL at 17:10

## 2021-10-03 RX ADMIN — GABAPENTIN 125 MG: 250 SOLUTION ORAL at 12:02

## 2021-10-03 RX ADMIN — OXYCODONE AND ACETAMINOPHEN 1 TABLET: 7.5; 325 TABLET ORAL at 21:16

## 2021-10-03 RX ADMIN — OXYCODONE AND ACETAMINOPHEN 1 TABLET: 7.5; 325 TABLET ORAL at 12:02

## 2021-10-03 RX ADMIN — FOLIC ACID 1 MG: 1 TABLET ORAL at 10:23

## 2021-10-03 RX ADMIN — METOCLOPRAMIDE 10 MG: 5 INJECTION, SOLUTION INTRAMUSCULAR; INTRAVENOUS at 17:02

## 2021-10-03 RX ADMIN — CIPROFLOXACIN 500 MG: 500 TABLET, FILM COATED ORAL at 21:05

## 2021-10-03 RX ADMIN — CIPROFLOXACIN 500 MG: 500 TABLET, FILM COATED ORAL at 10:23

## 2021-10-03 ASSESSMENT — PAIN SCALES - GENERAL
PAINLEVEL_OUTOF10: 6
PAINLEVEL_OUTOF10: 0
PAINLEVEL_OUTOF10: 8
PAINLEVEL_OUTOF10: 8
PAINLEVEL_OUTOF10: 7
PAINLEVEL_OUTOF10: 8

## 2021-10-03 NOTE — PROGRESS NOTES
2000- Ree Medina on floor to round on the patient. Informed him of no stool sample to send for C-Diff today and potassium is low and she is receiving supplements. Patient is tired but was able to eat today. Patient turned on her right side. 2300- Patient is nauseated and had a small emesis of bile. Patient medicated with Zofran. New IV was placed for fluids and antibiotics. Patient turned in the bed on her left side.     0030-lab unable to draw her labs

## 2021-10-03 NOTE — PROGRESS NOTES
Physician Progress Note    10/3/2021   1:39 PM    Name:  Juan Caba  MRN:    [id number]     IP Day: 2     Admit Date: 10/1/2021 10:40 AM  PCP: Iris Alvarenga MD    Code Status:  DNR-CCA      Assessment and Plan: Active Problems/ diagnosis:     Intractable nausea and vomiting  Possible ileus  Colitis-CT finding  Lactic acidosis-improved, no sepsis  Iron deficiency anemia  Severe protein calorie malnutrition  Severe joint deformity throughout her body due to severe rheumatoid arthritis  Debility  BMI 13      Plan  1. Continues to have significant nausea. Start IV Reglan scheduled. Resume as needed Zofran. GI seen, no plan for procedure at this time. 2. Resume Cipro and Flagyl, however switch to p.o.  3. Switch to p.o. Protonix  4. Home medication resumed as appropriate  5. Encourage p.o. intake  6. Discussed plan of care with patient and her  at bedside  7. Pain management is following, recommendation for SNF however patient is not interested. On chronic steroids for RA. 8. Recheck a.m. BMP, CBC  9. Presentation not consistent with C. difficile, will discontinue. No bowel movements the last 48 hours. Dispo-pending improvement in oral intake. Will be going home.  helps her at home he is retired. Subjective:      no new events. Physical Examination:      Vitals:  BP (!) 149/80   Pulse 65   Temp 98.6 °F (37 °C) (Oral)   Resp 18   Ht 5' 3\" (1.6 m)   Wt 73 lb 6.6 oz (33.3 kg)   SpO2 98%   BMI 13.00 kg/m²   Temp (24hrs), Av.7 °F (37.1 °C), Min:98.6 °F (37 °C), Max:98.8 °F (37.1 °C)      General appearance: alert, cooperative and no distress.   Very thin  Mental Status: oriented to person, place and time and normal affect  Lungs: clear to auscultation bilaterally, normal effort  Heart: regular rate and rhythm, no murmur  Abdomen: soft, nontender, nondistended, bowel sounds present, no masses  Extremities: Multiple deformities in extremities related to severe rheumatoid arthritis.   No edema, redness, tenderness in the calves  Skin: no gross lesions, rashes    Data:     Labs:  Recent Labs     10/01/21  1336 10/02/21  0635   WBC 14.6* 11.1*   HGB 10.9* 9.3*   * 296     Recent Labs     10/02/21  0635 10/02/21  2036    131*   K 2.9* 4.2    100   CO2 23 19*   BUN 7* 8   CREATININE 0.37* 0.35*   GLUCOSE 97 95     Recent Labs     10/01/21  1336   AST 29   ALT <5   BILITOT 0.3   ALKPHOS 57       Current Facility-Administered Medications   Medication Dose Route Frequency Provider Last Rate Last Admin    ciprofloxacin (CIPRO) tablet 500 mg  500 mg Oral 2 times per day Natividad Pop, DO   500 mg at 10/03/21 1023    metroNIDAZOLE (FLAGYL) tablet 500 mg  500 mg Oral 3 times per day Natividad Pop, DO        [START ON 10/4/2021] pantoprazole (PROTONIX) tablet 40 mg  40 mg Oral QAM AC Darin Rosen DO        metoclopramide (REGLAN) injection 10 mg  10 mg IntraVENous Q6H Darin Rosen DO   10 mg at 54/00/79 6825    folic acid (FOLVITE) tablet 1 mg  1 mg Oral Daily Anola Mylar, APRN - CNP   1 mg at 10/03/21 1023    levothyroxine (SYNTHROID) tablet 100 mcg  100 mcg Oral Daily Anola Mylar, APRN - CNP   100 mcg at 10/03/21 1023    predniSONE (DELTASONE) tablet 5 mg  5 mg Oral Daily Arinan [place], DO   5 mg at 10/03/21 1023    oxyCODONE-acetaminophen (PERCOCET) 7.5-325 MG per tablet 1 tablet  1 tablet Oral Q4H PRN Natividad Loverod, DO   1 tablet at 10/03/21 1202    fentaNYL (DURAGESIC) 12 MCG/HR 1 patch  1 patch TransDERmal Q72H Karyle Hanks, MD   1 patch at 10/02/21 2112    lidocaine 4 % external patch 3 patch  3 patch TransDERmal Daily Karyle Hanks, MD   3 patch at 10/03/21 1022    trolamine salicylate (ASPERCREME) 10 % cream   Topical 4x Daily PRN Karyle Hanks, MD        acetaminophen (TYLENOL) 160 MG/5ML solution 650 mg  650 mg Oral Q4H PRN Karyle Hanks, MD        gabapentin (NEURONTIN) 250 MG/5ML solution 125 mg  125 mg Oral 2 times per day Yony Gallego MD   125 mg at 10/03/21 1202    sodium chloride flush 0.9 % injection 10 mL  10 mL IntraVENous BID Wallace Del Real MD   10 mL at 10/02/21 2115    sodium chloride flush 0.9 % injection 5-40 mL  5-40 mL IntraVENous 2 times per day Fredia Katherine, APRN - NP   10 mL at 10/03/21 1024    sodium chloride flush 0.9 % injection 5-40 mL  5-40 mL IntraVENous PRN Fredia Stockton, APRN - NP        0.9 % sodium chloride infusion  25 mL IntraVENous PRN Fredia Katherine, APRN - NP        ondansetron (ZOFRAN-ODT) disintegrating tablet 4 mg  4 mg Oral Q8H PRN Fredia Katherine, APRN - NP        Or    ondansetron (ZOFRAN) injection 4 mg  4 mg IntraVENous Q6H PRN Fredia Stockton, APRN - NP   4 mg at 10/03/21 0414    polyethylene glycol (GLYCOLAX) packet 17 g  17 g Oral Daily PRN Selvindia Stockton, APRN - NP        HYDROmorphone (DILAUDID) injection 0.5 mg  0.5 mg IntraVENous Q4H PRN Selvindia Stockton, APRN - NP   0.5 mg at 10/02/21 4001       Additional work up or/and treatment plan may be added today or then after based on clinical progression. I am managing a portion of pt care. Some medical issues are handled by other specialists. Additional work up and treatment should be done in out pt setting by pt PCP and other out pt providers. In addition to examining and evaluating pt, I spent additional time explaining care, normaland abnormal findings, and treatment plan. All of pt questions were answered. Counseling, diet and education were provided. Case will be discussed with nursing staff when appropriate. Family will be updated if and when appropriate.        Electronically signed by Flores Christianson DO on 10/3/2021 at 1:39 PM

## 2021-10-04 VITALS
TEMPERATURE: 98.2 F | HEIGHT: 63 IN | SYSTOLIC BLOOD PRESSURE: 103 MMHG | BODY MASS INDEX: 13.01 KG/M2 | OXYGEN SATURATION: 98 % | WEIGHT: 73.41 LBS | HEART RATE: 67 BPM | DIASTOLIC BLOOD PRESSURE: 58 MMHG | RESPIRATION RATE: 18 BRPM

## 2021-10-04 LAB
ANION GAP SERPL CALCULATED.3IONS-SCNC: 14 MEQ/L (ref 9–15)
ANISOCYTOSIS: ABNORMAL
BASOPHILS ABSOLUTE: 0 K/UL (ref 0–0.2)
BASOPHILS RELATIVE PERCENT: 0.8 %
BUN BLDV-MCNC: 4 MG/DL (ref 8–23)
CALCIUM SERPL-MCNC: 7.6 MG/DL (ref 8.5–9.9)
CHLORIDE BLD-SCNC: 97 MEQ/L (ref 95–107)
CO2: 17 MEQ/L (ref 20–31)
CREAT SERPL-MCNC: 0.35 MG/DL (ref 0.5–0.9)
EOSINOPHILS ABSOLUTE: 0.2 K/UL (ref 0–0.7)
EOSINOPHILS RELATIVE PERCENT: 3.1 %
GFR AFRICAN AMERICAN: >60
GFR NON-AFRICAN AMERICAN: >60
GLUCOSE BLD-MCNC: 62 MG/DL (ref 70–99)
HCT VFR BLD CALC: 26.9 % (ref 37–47)
HEMOGLOBIN: 8.5 G/DL (ref 12–16)
HYPOCHROMIA: ABNORMAL
LYMPHOCYTES ABSOLUTE: 0.8 K/UL (ref 1–4.8)
LYMPHOCYTES RELATIVE PERCENT: 13.8 %
MCH RBC QN AUTO: 27.2 PG (ref 27–31.3)
MCHC RBC AUTO-ENTMCNC: 31.6 % (ref 33–37)
MCV RBC AUTO: 86.1 FL (ref 82–100)
MONOCYTES ABSOLUTE: 0.7 K/UL (ref 0.2–0.8)
MONOCYTES RELATIVE PERCENT: 10.6 %
NEUTROPHILS ABSOLUTE: 4.4 K/UL (ref 1.4–6.5)
NEUTROPHILS RELATIVE PERCENT: 71.7 %
OVALOCYTES: ABNORMAL
PDW BLD-RTO: 26.2 % (ref 11.5–14.5)
PLATELET # BLD: 266 K/UL (ref 130–400)
PLATELET SLIDE REVIEW: NORMAL
POIKILOCYTES: ABNORMAL
POTASSIUM SERPL-SCNC: 3.8 MEQ/L (ref 3.4–4.9)
RBC # BLD: 3.13 M/UL (ref 4.2–5.4)
SODIUM BLD-SCNC: 128 MEQ/L (ref 135–144)
WBC # BLD: 6.2 K/UL (ref 4.8–10.8)

## 2021-10-04 PROCEDURE — 6360000002 HC RX W HCPCS: Performed by: INTERNAL MEDICINE

## 2021-10-04 PROCEDURE — 80048 BASIC METABOLIC PNL TOTAL CA: CPT

## 2021-10-04 PROCEDURE — 6370000000 HC RX 637 (ALT 250 FOR IP): Performed by: ANESTHESIOLOGY

## 2021-10-04 PROCEDURE — 2580000003 HC RX 258: Performed by: NURSE PRACTITIONER

## 2021-10-04 PROCEDURE — 99231 SBSQ HOSP IP/OBS SF/LOW 25: CPT | Performed by: INTERNAL MEDICINE

## 2021-10-04 PROCEDURE — 6370000000 HC RX 637 (ALT 250 FOR IP): Performed by: NURSE PRACTITIONER

## 2021-10-04 PROCEDURE — 36415 COLL VENOUS BLD VENIPUNCTURE: CPT

## 2021-10-04 PROCEDURE — 85025 COMPLETE CBC W/AUTO DIFF WBC: CPT

## 2021-10-04 PROCEDURE — 97535 SELF CARE MNGMENT TRAINING: CPT

## 2021-10-04 PROCEDURE — 2580000003 HC RX 258: Performed by: EMERGENCY MEDICINE

## 2021-10-04 PROCEDURE — 6370000000 HC RX 637 (ALT 250 FOR IP): Performed by: INTERNAL MEDICINE

## 2021-10-04 RX ORDER — PANTOPRAZOLE SODIUM 40 MG/1
40 TABLET, DELAYED RELEASE ORAL
Qty: 30 TABLET | Refills: 3 | Status: SHIPPED | OUTPATIENT
Start: 2021-10-05 | End: 2022-01-18 | Stop reason: SDUPTHER

## 2021-10-04 RX ORDER — CIPROFLOXACIN 500 MG/1
500 TABLET, FILM COATED ORAL EVERY 12 HOURS SCHEDULED
Qty: 12 TABLET | Refills: 0 | Status: SHIPPED | OUTPATIENT
Start: 2021-10-04 | End: 2021-10-10

## 2021-10-04 RX ORDER — METOCLOPRAMIDE 10 MG/1
5 TABLET ORAL
Qty: 120 TABLET | Refills: 3 | Status: SHIPPED | OUTPATIENT
Start: 2021-10-04 | End: 2022-08-10 | Stop reason: SDUPTHER

## 2021-10-04 RX ORDER — METRONIDAZOLE 500 MG/1
500 TABLET ORAL EVERY 8 HOURS SCHEDULED
Qty: 18 TABLET | Refills: 0 | Status: SHIPPED | OUTPATIENT
Start: 2021-10-04 | End: 2021-10-10

## 2021-10-04 RX ORDER — FENTANYL 12 UG/H
1 PATCH TRANSDERMAL
Qty: 5 PATCH | Refills: 0 | Status: SHIPPED | OUTPATIENT
Start: 2021-10-05 | End: 2021-10-20

## 2021-10-04 RX ORDER — LIDOCAINE 4 G/G
3 PATCH TOPICAL DAILY
Qty: 60 PATCH | Refills: 1 | Status: SHIPPED | OUTPATIENT
Start: 2021-10-05 | End: 2022-03-16

## 2021-10-04 RX ADMIN — FOLIC ACID 1 MG: 1 TABLET ORAL at 10:09

## 2021-10-04 RX ADMIN — TROLAMINE SALICYLATE: 100 CREAM TOPICAL at 11:41

## 2021-10-04 RX ADMIN — PREDNISONE 5 MG: 5 TABLET ORAL at 10:09

## 2021-10-04 RX ADMIN — OXYCODONE AND ACETAMINOPHEN 1 TABLET: 7.5; 325 TABLET ORAL at 06:18

## 2021-10-04 RX ADMIN — GABAPENTIN 125 MG: 250 SOLUTION ORAL at 10:11

## 2021-10-04 RX ADMIN — CIPROFLOXACIN 500 MG: 500 TABLET, FILM COATED ORAL at 10:09

## 2021-10-04 RX ADMIN — OXYCODONE AND ACETAMINOPHEN 1 TABLET: 7.5; 325 TABLET ORAL at 14:37

## 2021-10-04 RX ADMIN — OXYCODONE AND ACETAMINOPHEN 1 TABLET: 7.5; 325 TABLET ORAL at 02:11

## 2021-10-04 RX ADMIN — METOCLOPRAMIDE 10 MG: 5 INJECTION, SOLUTION INTRAMUSCULAR; INTRAVENOUS at 06:17

## 2021-10-04 RX ADMIN — SODIUM CHLORIDE, PRESERVATIVE FREE 10 ML: 5 INJECTION INTRAVENOUS at 10:11

## 2021-10-04 RX ADMIN — OXYCODONE AND ACETAMINOPHEN 1 TABLET: 7.5; 325 TABLET ORAL at 10:16

## 2021-10-04 RX ADMIN — PANTOPRAZOLE SODIUM 40 MG: 40 TABLET, DELAYED RELEASE ORAL at 06:18

## 2021-10-04 RX ADMIN — METOCLOPRAMIDE 10 MG: 5 INJECTION, SOLUTION INTRAMUSCULAR; INTRAVENOUS at 13:05

## 2021-10-04 RX ADMIN — METRONIDAZOLE 500 MG: 500 TABLET ORAL at 06:18

## 2021-10-04 RX ADMIN — LEVOTHYROXINE SODIUM 100 MCG: 0.1 TABLET ORAL at 06:18

## 2021-10-04 RX ADMIN — METRONIDAZOLE 500 MG: 500 TABLET ORAL at 14:35

## 2021-10-04 RX ADMIN — SODIUM CHLORIDE, PRESERVATIVE FREE 10 ML: 5 INJECTION INTRAVENOUS at 10:12

## 2021-10-04 ASSESSMENT — PAIN SCALES - GENERAL
PAINLEVEL_OUTOF10: 7
PAINLEVEL_OUTOF10: 7
PAINLEVEL_OUTOF10: 6
PAINLEVEL_OUTOF10: 5

## 2021-10-04 NOTE — FLOWSHEET NOTE
Spoke to patient's nurse, Kasia Boyec to follow up with her on how patient's LAC is after having IV infiltrate on 10/1. Per Kasia Boyce, patient's LAC has some bruising, but patient does not have any complaints of pain from the site.

## 2021-10-04 NOTE — CARE COORDINATION
Patient spouse is the patients caregiver 24/7. They do not feel they need any home going services at this time. No hhc is to be arranged. Per spouse if the patient needs any further services they will reach out to the patient's pcp and obtain orders then. Plan is for the patient to discharge home today w/ spouse this afternoon. Per nursing the spouse is planning to transport the patient home.

## 2021-10-04 NOTE — DISCHARGE SUMMARY
present, no masses  Extremities: Multiple deformities in extremities related to severe rheumatoid arthritis. No edema, redness, tenderness in the calves  Skin: no gross lesions, rashes    Patient was seen by the following consultants while admitted to [facility]:   Consults:  [id number] [facility] CONSULT TO DIETITIAN    Significant Diagnostic Studies:    Refer to chart     Please refer to chart if no studies are shown here    CT ABDOMEN PELVIS W IV CONTRAST Additional Contrast? None    Result Date: 10/1/2021  Comparison: No prior History: Abdominal pain, nausea vomiting and diarrhea  Technique: CT ABDOMEN PELVIS W IV CONTRAST Helically Acquired CT of the  abdomen and pelvis was performed during the intravenous infusion of 100 mL of Isovue-370. Axial delayed images were also performed. Reformatted sagittal and coronal images were also  obtained. Findings: The visualized bases of the lungs contain no consolidating pneumonia, pleural effusion or pneumothorax. There are scattered opacities seen in the visualized right lower lobe with the largest measuring 6 mm. In the posterior medial aspect of the  right lower lobe there are groundglass opacities. In the lingula there is atelectasis versus infiltrate. The gallbladder is surgically absent. Free fluid is seen in the abdomen and pelvis. Bowel wall thickening is seen in the colon. There are fluid-filled loops of small bowel visualized. In the right side of the pelvis there are cysts sutures seen. In this area narrowed loops of bowel are seen. The urinary bladder is very distended there is free fluid seen in the posterior pelvis. The pancreas is atrophic. Mild intrahepatic ductal dilation is seen but is thought likely due to cholecystectomy. The liver is decreased in attenuation. The spleen is unremarkable. No focal masses identified in the adrenal glands.   Bilaterally both kidneys show uptake and excretion of contrast with no evidence for hydronephrosis or renal calculi. Diffuse atherosclerotic calcifications are seen in the abdomen and pelvis. A total hip prosthetic is seen on the left. Severe intervertebral disc space narrowing is seen at L4-5. Anterolisthesis of L4 on L5 is seen. The L5 vertebral body is posterior in relationship to L4 and protrudes posteriorly into the spinal canal. This appears to be chronic. Impression: 1. Multiple fluid-filled loops of small bowel are seen that could represent ileus. . The cerebellum measure up to 2.5 cm in greatest transverse diameter. In the right lower pelvis there are narrowed loops of bowel visualized and developing small bowel obstruction is not excluded. 2. Thick walled loops of large bowel are seen versus incomplete distention that may reflect colitis. 3. There is free fluid seen in the abdomen and pelvis and the liver is decreased in attenuation. The pancreas is atrophic. Sclerosis is also a consideration. 4. Severe intervertebral disc space narrowing is seen at L4-5 and the L5 vertebral body is displaced posteriorly and appears to be chronically position with sclerosis visualized. All CT scans at this facility use dose modulation, iterative reconstruction, and/or weight based dosing       Discharge Medications:       Sae Guo   Home Medication Instructions PAN:[id number]    Printed on:10/04/21 1210   Medication Information                      Calcium Carbonate-Vit D-Min (CALTRATE 600+D PLUS PO)  Take by mouth daily             Carboxymethylcellul-Glycerin (REFRESH RELIEVA OP)  Apply to eye             ciprofloxacin (CIPRO) 500 MG tablet  Take 1 tablet by mouth every 12 hours for 12 doses             etanercept (ENBREL) 50 MG/ML injection  Inject into the skin once a week Takes monday             folic acid (FOLVITE) 1 MG tablet  TAKE 1 TABLET BY MOUTH ONCE DAILY             iron sucrose (VENOFER) 20 MG/ML injection  Infuse 100 mg intravenously Q 3 months.   Had 1 month ago             levothyroxine (SYNTHROID) 100 MCG tablet  TAKE 1 TABLET BY MOUTH ONCE DAILY             Lifitegrast (XIIDRA) 5 % SOLN  Apply 1 drop to eye 2 times daily             metoclopramide (REGLAN) 10 MG tablet  Take 0.5 tablets by mouth 3 times daily (with meals)             metroNIDAZOLE (FLAGYL) 500 MG tablet  Take 1 tablet by mouth every 8 hours for 18 doses             Multiple Vitamins-Minerals (CENTRUM ADULTS PO)  Take by mouth             oxyCODONE-acetaminophen (PERCOCET) 7.5-325 MG per tablet  TAKE 1 2 (ONE HALF) TABLET BY MOUTH EVERY 4 HOURS AS NEEDED AND 1 AT BEDTIME AS NEEDED AS DIRECTED             pantoprazole (PROTONIX) 40 MG tablet  Take 1 tablet by mouth every morning (before breakfast)             predniSONE (DELTASONE) 5 MG tablet  Take 5 mg by mouth daily             Pregabalin (LYRICA PO)  Take 7 mLs by mouth nightly             zoledronic acid (RECLAST) 5 MG/100ML SOLN  Infuse 5 mg intravenously once Yearly                 Disposition:   If discharged to Home, [facility] needs that were indicated and/or required as been addressed and set up by Social Work. Condition at discharge: good    Activity: activity as tolerated    Total time taken for discharging this patient: 40 minutes. Greater than 70% of time was spent focused exclusively on this patient. Time was taken to review chart, discuss plans with consultants, reconciling medications, discussing plan answering questions with patient.      Marianna Sweet DO  10/4/2021, 9:34 AM  ----------------------------------------------------------------------------------------------------------------------    Tommy Gorman

## 2021-10-04 NOTE — PROGRESS NOTES
PROGRESS NOTE -PAIN MANAGEMENT     SERVICE DATE:  10/4/2021   SERVICE TIME:  1:22 PM    CHIEFCOMPLAINT: Severe generalized pain due to severe rheumatoid arthritis      SUBJECTIVE:  Ms. Bree Parson is a 78 y.o. female who presentedfor initially for complaint regarding GI symptoms with abdominal pain and nausea and vomiting due to colitis. I was requested to come to see the patient due to severe chronic generalized pain due to rheumatoid disease. Patient has end-stage rheumatoid disease and fibromyalgia she is on short acting oxycodone as an outpatient and Lyrica, also has difficulty swallowing so she is receiving all her medication in the liquid form. I had started the patient on low-dose Duragesic patch and seem to be helping    Patient states  pain is much improved. I was asked to come and see the patient evaluate her to outline plan for discharge.     PAIN  ASSESSMENT:    constant, waxing and waning    aching and throbbing    pain is perceived as moderate (4-6 pain scale)      MEDICATIONS:    Current Facility-Administered Medications   Medication Dose Route Frequency Provider Last Rate Last Admin    ciprofloxacin (CIPRO) tablet 500 mg  500 mg Oral 2 times per day Nolan Marshall DO   500 mg at 10/04/21 1009    metroNIDAZOLE (FLAGYL) tablet 500 mg  500 mg Oral 3 times per day Nolan Marshall DO   500 mg at 10/04/21 0618    pantoprazole (PROTONIX) tablet 40 mg  40 mg Oral QAM AC Darin Rosen, DO   40 mg at 10/04/21 0618    metoclopramide (REGLAN) injection 10 mg  10 mg IntraVENous Q6H Darin Rosen, DO   10 mg at 78/71/54 6795    folic acid (FOLVITE) tablet 1 mg  1 mg Oral Daily Dylan Vasquez, APRN - CNP   1 mg at 10/04/21 1009    levothyroxine (SYNTHROID) tablet 100 mcg  100 mcg Oral Daily Holcombewendy Vasquez, APRN - CNP   100 mcg at 10/04/21 0618    predniSONE (DELTASONE) tablet 5 mg  5 mg Oral Daily Nolan Marshall DO   5 mg at 10/04/21 1009    oxyCODONE-acetaminophen (PERCOCET) 7.5-325 MG per tablet 1 tablet  1 tablet Oral Q4H PRN Deb German, DO   1 tablet at 10/04/21 1016    fentaNYL (DURAGESIC) 12 MCG/HR 1 patch  1 patch TransDERmal Q72H Ana Edgar MD   1 patch at 10/02/21 2112    lidocaine 4 % external patch 3 patch  3 patch TransDERmal Daily Ana Edgar MD   3 patch at 10/04/21 1011    trolamine salicylate (ASPERCREME) 10 % cream   Topical 4x Daily PRN Ana Edgar MD   Given at 10/04/21 1141    acetaminophen (TYLENOL) 160 MG/5ML solution 650 mg  650 mg Oral Q4H PRN Ana Edgar MD        gabapentin (NEURONTIN) 250 MG/5ML solution 125 mg  125 mg Oral 2 times per day Ana Edgar MD   125 mg at 10/04/21 1011    sodium chloride flush 0.9 % injection 10 mL  10 mL IntraVENous BID Raphael Rios MD   10 mL at 10/04/21 1012    sodium chloride flush 0.9 % injection 5-40 mL  5-40 mL IntraVENous 2 times per day Coye Scott, APRN - NP   10 mL at 10/04/21 1011    sodium chloride flush 0.9 % injection 5-40 mL  5-40 mL IntraVENous PRN Coye Scott, APRN - NP        0.9 % sodium chloride infusion  25 mL IntraVENous PRN Coye Scott, APRN - NP        ondansetron (ZOFRAN-ODT) disintegrating tablet 4 mg  4 mg Oral Q8H PRN Coye Scott, APRN - NP        Or    ondansetron (ZOFRAN) injection 4 mg  4 mg IntraVENous Q6H PRN Coye Scott, APRN - NP   4 mg at 10/03/21 0414    polyethylene glycol (GLYCOLAX) packet 17 g  17 g Oral Daily PRN Coye Scott, APRN - NP        HYDROmorphone (DILAUDID) injection 0.5 mg  0.5 mg IntraVENous Q4H PRN Coye Scott, APRN - NP   0.5 mg at 10/02/21 0929         ALLERGIES:  Hydrocodone-acetaminophen, Hydroxychloroquine, Indomethacin, Amoxicillin, Lactose, and Other    Review of Systems  Constitutional: Positive for activity change, appetite change, fatigue and unexpected weight change. Negative for chills, diaphoresis and fever. HENT: Positive for trouble swallowing.  Negative for congestion, dental problem, drooling, ear discharge, ear pain, facial swelling, hearing loss, mouth sores, nosebleeds, postnasal drip, rhinorrhea, sinus pressure, sinus pain, sneezing, sore throat, tinnitus and voice change. Eyes: Negative. Respiratory: Negative. Cardiovascular: Negative. Gastrointestinal: Positive for abdominal distention, abdominal pain, constipation and nausea. Negative for anal bleeding, blood in stool, diarrhea, rectal pain and vomiting. Endocrine: Negative. Genitourinary: Negative. Musculoskeletal: Positive for arthralgias, back pain, gait problem, joint swelling, myalgias, neck pain and neck stiffness. Skin: Positive for color change and pallor. Negative for rash and wound. Allergic/Immunologic: Positive for immunocompromised state. Negative for environmental allergies and food allergies. Neurological: Positive for weakness and numbness. Negative for dizziness, seizures, syncope, facial asymmetry, speech difficulty, light-headedness and headaches. Hematological: Negative for adenopathy. Bruises/bleeds easily. Psychiatric/Behavioral: Positive for behavioral problems, decreased concentration, dysphoric mood and sleep disturbance. Negative for agitation, confusion, hallucinations, self-injury and suicidal ideas. The patient is nervous/anxious. The patient is not hyperactive.           OBJECTIVE  PHYSICAL EXAM:  BP (!) 103/58   Pulse 67   Temp 98.2 °F (36.8 °C) (Oral)   Resp 18   Ht 5' 3\" (1.6 m)   Wt 73 lb 6.6 oz (33.3 kg)   SpO2 98%   BMI 13.00 kg/m²   Body mass index is 13 kg/m². CONSTITUTIONAL:  Patient appears to be more awake and comfortable, still weak lethargic and low muscle mass but much more communicative than initially seen. ENT:  normocepalic, without obvious abnormality, atraumatic  NECK: Very stiff neck, limited range of motion due to rheumatological disease  BACK: Kyphotic, kyphotic but sitting on the bed appears more comfortable.     LUNGS:  no increased work of breathing  CARDIOVASCULAR:  regular rate and rhythm  ABDOMEN: Soft and nondistended  MUSCULOSKELETAL:  Unchanged deformity for lower extremities ankles, and upper extremity resting joints. NEUROLOGIC: Mental status seem to be intact, decreased muscle mass associated with weakness, symmetrical, 4+/5, sensory is symmetrical  SKIN: Very sensitive, frail skin    DATA: tests reviewed for today's visit:    All labs and imaging results reviewed. Lab Results   Component Value Date    WBC 6.2 10/04/2021    HGB 8.5 10/04/2021    HCT 26.9 10/04/2021    MCV 86.1 10/04/2021     10/04/2021     10/04/2021    K 3.8 10/04/2021    K 2.9 10/02/2021    CL 97 10/04/2021    CO2 17 10/04/2021    BUN 4 10/04/2021    CREATININE 0.35 10/04/2021    CALCIUM 7.6 10/04/2021    ALKPHOS 57 10/01/2021    ALT <5 10/01/2021    AST 29 10/01/2021    BILITOT 0.3 10/01/2021    LABALBU 3.1 10/01/2021   LABS  Recent Labs     10/01/21  1336 10/02/21  0635 10/04/21  0620   WBC 14.6* 11.1* 6.2   RBC 4.18* 3.50* 3.13*   HGB 10.9* 9.3* 8.5*   HCT 35.4* 29.6* 26.9*   MCV 84.8 84.6 86.1   MCH 26.1* 26.7* 27.2   MCHC 30.7* 31.5* 31.6*   RDW 27.8* 27.2* 26.2*   * 296 266       Recent Labs     10/02/21  0635 10/02/21  2036 10/04/21  0620    131* 128*   K 2.9* 4.2 3.8    100 97   CO2 23 19* 17*   BUN 7* 8 4*   CREATININE 0.37* 0.35* 0.35*   GLUCOSE 97 95 62*   CALCIUM 7.3* 7.4* 7.6*       Recent Labs     10/01/21  1336 10/02/21  0635   MG 2.1 1.8       No results found for: LABAMPH, BARBSCNU, LABBENZ, CANSU, COCAIMETSCRU, OPIATESCREENURINE, PHENCYCLIDINESCREENURINE, LABMETH, PROPOX, OXYCODONEUR, DSCOMMENT    CT ABDOMEN PELVIS W IV CONTRAST Additional Contrast? None    Result Date: 10/1/2021  Comparison: No prior History: Abdominal pain, nausea vomiting and diarrhea  Technique: CT ABDOMEN PELVIS W IV CONTRAST Helically Acquired CT of the  abdomen and pelvis was performed during the intravenous infusion of 100 mL of Isovue-370. Axial delayed images were also performed. Reformatted sagittal and coronal images were also  obtained. Findings: The visualized bases of the lungs contain no consolidating pneumonia, pleural effusion or pneumothorax. There are scattered opacities seen in the visualized right lower lobe with the largest measuring 6 mm. In the posterior medial aspect of the  right lower lobe there are groundglass opacities. In the lingula there is atelectasis versus infiltrate. The gallbladder is surgically absent. Free fluid is seen in the abdomen and pelvis. Bowel wall thickening is seen in the colon. There are fluid-filled loops of small bowel visualized. In the right side of the pelvis there are cysts sutures seen. In this area narrowed loops of bowel are seen. The urinary bladder is very distended there is free fluid seen in the posterior pelvis. The pancreas is atrophic. Mild intrahepatic ductal dilation is seen but is thought likely due to cholecystectomy. The liver is decreased in attenuation. The spleen is unremarkable. No focal masses identified in the adrenal glands. Bilaterally both kidneys show uptake and excretion of contrast with no evidence for hydronephrosis or renal calculi. Diffuse atherosclerotic calcifications are seen in the abdomen and pelvis. A total hip prosthetic is seen on the left. Severe intervertebral disc space narrowing is seen at L4-5. Anterolisthesis of L4 on L5 is seen. The L5 vertebral body is posterior in relationship to L4 and protrudes posteriorly into the spinal canal. This appears to be chronic. Impression: 1. Multiple fluid-filled loops of small bowel are seen that could represent ileus. . The cerebellum measure up to 2.5 cm in greatest transverse diameter. In the right lower pelvis there are narrowed loops of bowel visualized and developing small bowel obstruction is not excluded. 2. Thick walled loops of large bowel are seen versus incomplete distention that may reflect colitis.  3. There is free fluid seen in the abdomen and pelvis and the liver is decreased in attenuation. The pancreas is atrophic. Sclerosis is also a consideration. 4. Severe intervertebral disc space narrowing is seen at L4-5 and the L5 vertebral body is displaced posteriorly and appears to be chronically position with sclerosis visualized. All CT scans at this facility use dose modulation, iterative reconstruction, and/or weight based dosing             [facility] [address] [facility] [address] Problems    Diagnosis Date Noted    Generalized muscle ache [M79.10] 10/02/2021    Long-term current use of opiate analgesic [Z79.891] 10/02/2021    Acquired deformity joint foot [M21.969] 10/02/2021    Acquired deformity of hand [M21.949] 10/02/2021    Long term (current) use of systemic steroids [Z79.52] 10/02/2021    Muscle weakness (generalized) [M62.81] 10/02/2021    Abnormality of gait [R26.9] 10/02/2021    Difficulty swallowing [R13.10] 10/02/2021    Fibromyalgia, secondary [M79.7] 10/02/2021    Colitis [K52.9] 10/01/2021    Chronic pain [G89.29] 11/07/2018    Rheu arthritis w rheu factor mult site w/o org/sys involv ([address] [M05.79] 11/07/2018    Secondary Sjogren's syndrome ([address] [M35.00] 02/21/2012      I had started the patient on low-dose Duragesic patch 12 mcg every 72 hours which seemed that the patient has no side effect and actually helped her to manage a lot of her ongoing pain she has better sleep pattern also has decreased her need for the breakthrough oral Percocet. I have long discussion with the patient and her  about the current plan of care, she was going anyway to be referred to me and discuss follow-up plan, I will be providing her with prescription of Duragesic patch that last for 2 weeks until able to follow-up with me.     I discussed also the breakthrough oxycodone that been prescribed by her rheumatologist to take it as needed, she had recent prescription given on September 20 which did have some good 20 days of left, she is going to be using it between 3-4 a day and see how she does. RECOMMENDATION:  SEE ORDERS    Will be reconciling medication, based on the request of the hospital medicine, I will be giving the patient a prescription of the Duragesic patch 12 mcg every 72 hours, count of 5 patches to last for 15 days. I had [facility] that she can take 20 mg at a.m., 20 mg the afternoon and she can take 2 or 3 mL / 60 mg at night, discontinue Neurontin    Recommendation for CBD oil, recommendation for Voltaren cream analgesic cream as needed    Discussion about follow-up as an outpatient to discuss [place] therapy  .     SIGNATURE: Yony Gallego MD PATIENT NAME: Jocelyn Canseco   DATE: October 4, 2021 MRN: [id number]   TIME: 1:22 PM PAGER/CONTACT #: [phone]

## 2021-10-04 NOTE — PROGRESS NOTES
1033 - assessment complete, pt A&Ox4. Pt denies nausea this morning. Redness noted on pt sacrum and r heel, pt turns self. Pt in bed with call light in reach.

## 2021-10-04 NOTE — PROGRESS NOTES
Gastroenterology Progress Note      [facility]   Hospitalization Day:3    Chief C/o: Enteritis    SUBJECTIVE: Patient reports feeling better today. Nausea has subsided with antiemetics, she denies vomiting/hematemesis. She has not had a bowel movement since admission to the hospital, however is passing flatus. She denies abdominal pain. Physical    VITALS:  BP (!) 103/58   Pulse 67   Temp 98.2 °F (36.8 °C) (Oral)   Resp 18   Ht 5' 3\" (1.6 m)   Wt 73 lb 6.6 oz (33.3 kg)   SpO2 98%   BMI 13.00 kg/m²   TEMPERATURE:  Current - Temp: 98.2 °F (36.8 °C); Max - Temp  Av.3 °F (36.8 °C)  Min: 98.2 °F (36.8 °C)  Max: 98.4 °F (36.9 °C)    General-alert, oriented x3, in no acute distress, appears severely malnourished, thin/frail  Eyes- anicteric sclera, no pallor  Cardiovascular- RRR withtout murmur  Lungs- clear to auscultation bilaterally  Abdomen soft, nondistended, nontender, Bowel sounds normal   Extremities- without edema, RA deformities present in bilateral upper and lower extremities  Skin- without jaundice    Data      Recent Labs     10/01/21  1336 10/02/21  0635 10/04/21  0620   WBC 14.6* 11.1* 6.2   HGB 10.9* 9.3* 8.5*   HCT 35.4* 29.6* 26.9*   MCV 84.8 84.6 86.1   * 296 266     Recent Labs     10/01/21  1336 10/02/21  0635 10/02/21  2036    135 131*   K 4.1 2.9* 4.2   CL 98 101 100   CO2 20 23 19*   BUN 8 7* 8   CREATININE 0.33* 0.37* 0.35*     Recent Labs     10/01/21  1336   AST 29   ALT <5   BILITOT 0.3   ALKPHOS 57     Recent Labs     10/01/21  1336   LIPASE 16     No results for input(s): PROTIME, INR in the last 72 hours. ASSESSMENT :  78 y.o. female with PMH severe rheumatoid arthritis, fibromyalgia, osteoporosis, surgeon syndrome, HTN, iron deficiency anemia (received iron infusions @  though rheumatology), admitted with a history of nausea, vomiting, generalized weakness, and diarrhea for approximately 4 weeks.  Patient's symptoms seemed to be resolving 9 days ago, however they returned so she presented to the ER. Also, with history of similar symptoms after eating undercooked food 1 year ago, without full resolution of symptoms since.  -Wide differential for patient's symptoms including acute gastroenteritis (viral or bacterial) or ?postinfectious IBS. Ileus and SBO in differential as well, although less likely as patient is passing flatus. On admit, WBCs 14.6, Hgb 10.9, MCV 84.8, platelets 520, BUN 8, creatinine 0.33.  10/2/21, WBCs 11.1, Hgb 9.3, HCT 29.6, MCV 84.6, platelets 487. Today, Hgb 8.5, MCV 86.1, platelets 430    PLAN:  -Observe clinical course, medical management per primary team  - Advance diet, soft/lactose free  - Recommend limiting opioid use as much as possible  - Recommend Miralax daily PRN (continue upon discharge)  - Recommend probiotic daily (continue upon discharge)    Thank you for allowing me to participate in the care of your patient.   Feel free to contact me with any questions or concerns    Angelica Katz MD

## 2021-10-04 NOTE — PROGRESS NOTES
Physical Therapy Med Surg Daily Treatment Note  Facility/Department: [facility]  Room: [place] [address]       NAME: No Ayoub  : 1955 (51 y.o.)  MRN: [id number]  CODE STATUS: DNR-CCA    Date of Service: 10/4/2021    Patient Diagnosis(es): Lactic acidosis [E87.2]  Colitis [K52.9]  Diarrhea, unspecified type [R19.7]  Non-intractable vomiting with nausea, unspecified vomiting type [R11.2]   Chief Complaint   Patient presents with    Abdominal Pain     n/v     Patient Active Problem List    Diagnosis Date Noted    Generalized muscle ache 10/02/2021    Long-term current use of opiate analgesic 10/02/2021    Acquired deformity joint foot 10/02/2021    Acquired deformity of hand 10/02/2021    Long term (current) use of systemic steroids 10/02/2021    Muscle weakness (generalized) 10/02/2021    Abnormality of gait 10/02/2021    Difficulty swallowing 10/02/2021    Fibromyalgia, secondary 10/02/2021    Colitis 10/01/2021    Dry eye syndrome, bilateral 2020    Rheu arthritis w rheu factor mult site w/o org/sys involv ([address] 2018    Chronic pain 2018    Anemia, unspecified 10/16/2018    Hypothyroidism, unspecified 10/16/2018    Age-related osteoporosis without current pathological fracture 2018    Secondary Sjogren's syndrome ([address] 2012    Steroid long-term use 2012        Past Medical History:   Diagnosis Date    Fibromyalgia     Fracture of pelvis ([address]     left    History of left hip replacement     History of total left knee replacement     History of total right knee replacement     Light sensitivity     bilateral eye    Osteoporosis     Rheumatoid arthritis ([address]     Sjogrens syndrome ([address]      Past Surgical History:   Procedure Laterality Date    CATARACT REMOVAL WITH IMPLANT  2013    bilateral    CHOLECYSTECTOMY      OTHER SURGICAL HISTORY      bilateral eye implant    OVARY REMOVAL  2000    right    REVISION TOTAL HIP ARTHROPLASTY  10/2012    left    TOTAL HIP ARTHROPLASTY  04/1990    left    TOTAL KNEE ARTHROPLASTY  08/1997    right and left     SUBJECTIVE   General  Chart Reviewed: Yes  Family / Caregiver Present: No  Subjective  Subjective: Patient resting in bed. Agreeable to tx. States she is hoping to go home later today. Pre-Session Pain Report     Pain Screening  Patient Currently in Pain: Denies  Post-Session Pain Report  Denies     OBJECTIVE   Bed mobility  Rolling to Left: Stand by assistance  Rolling to Right: Stand by assistance  Supine to Sit: Moderate assistance  Sit to Supine: Minimal assistance  Scooting: Minimal assistance  Comment: Patient requires increased time and effort to complete bed mobility    Transfers  Sit to Stand: Minimal Assistance (pt declined)  Stand to sit: Minimal Assistance    Ambulation  Ambulation?: Yes (pt declined)  Ambulation 1  Surface: level tile  Device: Rolling Walker  Assistance: Minimal assistance; Moderate assistance;2 Person assistance  Quality of Gait: bilateral ankle supination, absent heel strike/toe off, NBOS, small shuffling steps, several posterior LOB requiring min-mod A to correct  Distance: 3 feet forward/retro          ASSESSMENT   Body structures, Functions, Activity limitations: Decreased functional mobility ; Decreased strength;Decreased balance;Decreased ROM; Decreased posture; Increased pain  Assessment: Patient requires increased time and effort to complete mobility. C/o mild positional dizziness which subsided within 30 seconds. States she was previously ambulating household distances. Patient unable to ambulate >3 feet due to fatigue/weakness.   Prognosis: Good  Barriers to Learning: none  REQUIRES PT FOLLOW UP: Yes     Discharge Recommendations:  Continue to assess pending progress, Patient would benefit from continued therapy after discharge    Goals  Long term goals  Long term goal 1: Bed mobillity with SBA  Long term goal 2: unsupported sitting x8 min with supervision  Long term goal 3: progress to standing EOB with AD and min A  Long term goal 4: Functional transfers with Min A to promote OOB activity  Patient Goals   Patient goals : \"to feel better'    PLAN    Times per week: 3-6  Safety Devices  Type of devices: Left in bed, Call light within reach, Bed alarm in place     [facility] (6 CLICK) [address] Mobility Raw Score : 12     Therapy Time   Individual   Time In 1330   Time Out 1345   Minutes 15     Timed Code Treatment Minutes: 15 Minutes   tr [address], [facility], 10/04/21 at 2:27 PM         Definitions for assistance levels  Independent = pt does not require any physical supervision or assistance from another person for activity completion. Device may be needed.   Stand by assistance = pt requires verbal cues or instructions from another person, close to but not touching, to perform the activity  Minimal assistance= pt performs 75% or more of the activity; assistance is required to complete the activity  Moderate assistance= pt performs 50% of the activity; assistance is required to complete the activity  Maximal assistance = pt performs 25% of the activity; assistance is required to complete the activity  Dependent = pt requires total physical assistance to accomplish the task

## 2021-10-05 ENCOUNTER — CARE COORDINATION (OUTPATIENT)
Dept: CARE COORDINATION | Age: 66
End: 2021-10-05

## 2021-10-05 NOTE — CARE COORDINATION
[facility] Transitions Initial Follow Up Call    Call within 2 business days of discharge: Yes    Patient: Heidi Record Patient : 1955   MRN: [id number]  Reason for Admission: 10/1-10/4/21 Colitis  Discharge Date: 10/4/21 RARS: Readmission Risk Score: 15      Last Discharge [facility]       Complaint Diagnosis Description Type Department Provider    10/1/21 Abdominal Pain Colitis . .. ED to Hosp-Admission (Discharged) (ADMITTED) DO Eliana; Gabriela Dalton... Spoke with: Attempted to contact patient for Initial Care Transition call. Left HIPPA compliant message requesting return call. Will attempt to reach again. ALY Huerta Imam / [id number]  [phone]    Facility: [address] [place] [address]    Non-face-to-face services provided:       How to Care for Yourself at [facility] until you feel better. Your doctor may recommend that you eat bland foods. These include rice, dry toast or crackers, bananas, and applesauce. To prevent dehydration, drink plenty of fluids. Choose water and other clear liquids until you feel better. If you have kidney, heart, or liver disease and have to limit fluids, talk with your doctor before you increase the amountof fluids you drink. Be safe with medicines. Take your medicines exactly as prescribed. Call your doctor if you think you are having a problem with your medicine. You will get more details on the specific medicines your doctor prescribes. Call 911 anytime you think you may need emergency care. For example, call if:  You passed out (lost consciousness). Your stools are maroon or very bloody. Call your doctor now or seek immediate medical care if:  You have new or worse belly pain. You have a fever. You are vomiting. You cannot pass stools or gas. You have new or more blood in your stools.   Watch closely for changes in your health, and be sure to contact your doctor if:  You have new or worse symptoms. You are losing weight. You do not get better as expected.     Care Transitions 24 Hour Call    Care Transitions Interventions         Follow Up  Future Appointments   Date Time Provider [place] Gary   10/8/2021 11:20 AM Ivania Brian MD [place] [address] [address] [facility], [facility]

## 2021-10-06 ENCOUNTER — CARE COORDINATION (OUTPATIENT)
Dept: CARE COORDINATION | Age: 66
End: 2021-10-06

## 2021-10-06 NOTE — CARE COORDINATION
Jesús [facility] Transitions Initial Follow Up Call    Call within 2 business days of discharge: Yes    Patient: Jennifer Gatica Patient : 1955   MRN: [id number]  Reason for Admission: 10/1-10/4/21 Colitis  Discharge Date: 10/4/21 RARS: Readmission Risk Score: 15      Last Discharge [facility]       Complaint Diagnosis Description Type Department Provider    10/1/21 Abdominal Pain Colitis . .. ED to Hosp-Admission (Discharged) (ADMITTED) DO Eliana; Raphael Rios,... Spoke with: Second attempt made to contact patient for Initial Care Transition call. Left HIPPA compliant message requesting return call. CTN will sign off. ALY Huerta / [id number]  [phone]    Facility: [facility]    Non-face-to-face services provided:       How to Care for Yourself at [place] until you feel better. Your doctor may recommend that you eat bland foods. These include rice, dry toast or crackers, bananas, and applesauce. To prevent dehydration, drink plenty of fluids. Choose water and other clear liquids until you feel better. If you have kidney, heart, or liver disease and have to limit fluids, talk with your doctor before you increase the amountof fluids you drink. Be safe with medicines. Take your medicines exactly as prescribed. Call your doctor if you think you are having a problem with your medicine. You will get more details on the specific medicines your doctor prescribes.     Call 911 anytime you think you may need emergency care. For example, call if:  You passed out (lost consciousness). Your stools are maroon or very bloody.     Call your doctor now or seek immediate medical care if:  You have new or worse belly pain. You have a fever. You are vomiting. You cannot pass stools or gas. You have new or more blood in your stools.   Watch closely for changes in your health, and be sure to contact your doctor if:  You have new or worse symptoms. You are losing weight.   You do not get better as expected.       Care Transitions 24 Hour Call    Care Transitions Interventions         Follow Up  Future Appointments   Date Time Provider Eden Gary   10/8/2021 11:20 AM MD Chris Chacko        ALY Holden

## 2021-10-15 ENCOUNTER — OFFICE VISIT (OUTPATIENT)
Dept: FAMILY MEDICINE CLINIC | Age: 66
End: 2021-10-15
Payer: MEDICARE

## 2021-10-15 VITALS
HEIGHT: 63 IN | TEMPERATURE: 97.7 F | HEART RATE: 87 BPM | BODY MASS INDEX: 13.04 KG/M2 | DIASTOLIC BLOOD PRESSURE: 68 MMHG | OXYGEN SATURATION: 97 % | SYSTOLIC BLOOD PRESSURE: 116 MMHG | WEIGHT: 73.6 LBS

## 2021-10-15 DIAGNOSIS — D64.9 ANEMIA, UNSPECIFIED TYPE: ICD-10-CM

## 2021-10-15 DIAGNOSIS — R60.0 LOWER EXTREMITY EDEMA: ICD-10-CM

## 2021-10-15 DIAGNOSIS — M05.79 RHEU ARTHRITIS W RHEU FACTOR MULT SITE W/O ORG/SYS INVOLV (HCC): ICD-10-CM

## 2021-10-15 DIAGNOSIS — K52.9 COLITIS: Primary | ICD-10-CM

## 2021-10-15 DIAGNOSIS — E87.1 HYPONATREMIA: ICD-10-CM

## 2021-10-15 PROCEDURE — 99495 TRANSJ CARE MGMT MOD F2F 14D: CPT | Performed by: FAMILY MEDICINE

## 2021-10-15 PROCEDURE — 1111F DSCHRG MED/CURRENT MED MERGE: CPT | Performed by: FAMILY MEDICINE

## 2021-10-15 ASSESSMENT — ENCOUNTER SYMPTOMS
CONSTIPATION: 1
APNEA: 0
CHEST TIGHTNESS: 0
NAUSEA: 0
COUGH: 0
SHORTNESS OF BREATH: 0
VOMITING: 0
DIARRHEA: 0
ABDOMINAL PAIN: 0
BLOOD IN STOOL: 0

## 2021-10-15 NOTE — PROGRESS NOTES
Post-Discharge Transitional Care Management Services or Hospital Follow Up      Zenon Nicole   YOB: 1955    Date of Office Visit:  10/15/2021  Date of Hospital Admission: 10/1/21  Date of Hospital Discharge: 10/4/21  Readmission Risk Score(high >=14%. Medium >=10%):Readmission Risk Score: 15      Care management risk score Rising risk (score 2-5) and Complex Care (Scores >=6): 0     Non face to face  following discharge, date last encounter closed (first attempt may have been earlier): 10/6/2021  8:46 AM 10/6/2021  8:46 AM    Call initiated 2 business days of discharge: Yes     Patient Active Problem List   Diagnosis    Age-related osteoporosis without current pathological fracture    Anemia, unspecified    Hypothyroidism, unspecified    Rheu arthritis w rheu factor mult site w/o org/sys involv (HCC)    Chronic pain    Dry eye syndrome, bilateral    Secondary Sjogren's syndrome (HCC)    Steroid long-term use    Colitis    Generalized muscle ache    Long-term current use of opiate analgesic    Acquired deformity joint foot    Acquired deformity of hand    Long term (current) use of systemic steroids    Muscle weakness (generalized)    Abnormality of gait    Difficulty swallowing    Fibromyalgia, secondary       Allergies   Allergen Reactions    Hydrocodone-Acetaminophen Other (See Comments)     Dizzy, Nausea.     Hydroxychloroquine Other (See Comments)     Nausea, stomach upset    Indomethacin Other (See Comments)     Dizziness, nausea, rash and vomiting    Amoxicillin Diarrhea and Nausea And Vomiting    Lactose Nausea And Vomiting    Other      \"Cheap metals\"  rash       Medications listed as ordered at the time of discharge from hospital   [facility] Medication Instructions NITHYA:    Printed on:10/15/21 2004   Medication Information                      Calcium Carbonate-Vit D-Min (CALTRATE 600+D PLUS PO)  Take by mouth daily Carboxymethylcellul-Glycerin (REFRESH RELIEVA OP)  Apply to eye             etanercept (ENBREL) 50 MG/ML injection  Inject into the skin once a week Takes monday             fentaNYL (DURAGESIC) 12 MCG/HR  Place 1 patch onto the skin every 72 hours for 15 days. folic acid (FOLVITE) 1 MG tablet  TAKE 1 TABLET BY MOUTH ONCE DAILY             iron sucrose (VENOFER) 20 MG/ML injection  Infuse 100 mg intravenously Q 3 months. Had 1 month ago             levothyroxine (SYNTHROID) 100 MCG tablet  TAKE 1 TABLET BY MOUTH ONCE DAILY             lidocaine 4 % external patch  Place 3 patches onto the skin daily             Lifitegrast (XIIDRA) 5 % SOLN  Apply 1 drop to eye 2 times daily             metoclopramide (REGLAN) 10 MG tablet  Take 0.5 tablets by mouth 3 times daily (with meals)             Multiple Vitamins-Minerals (CENTRUM ADULTS PO)  Take by mouth             oxyCODONE-acetaminophen (PERCOCET) 7.5-325 MG per tablet  TAKE 1 2 (ONE HALF) TABLET BY MOUTH EVERY 4 HOURS AS NEEDED AND 1 AT BEDTIME AS NEEDED AS DIRECTED             pantoprazole (PROTONIX) 40 MG tablet  Take 1 tablet by mouth every morning (before breakfast)             predniSONE (DELTASONE) 5 MG tablet  Take 5 mg by mouth daily             Pregabalin (LYRICA PO)  Take 7 mLs by mouth nightly             zoledronic acid (RECLAST) 5 MG/100ML SOLN  Infuse 5 mg intravenously once Yearly                   Medications marked \"taking\" at this time  Outpatient Medications Marked as Taking for the 10/15/21 encounter (Office Visit) with Raissa Rose MD   Medication Sig Dispense Refill    metoclopramide (REGLAN) 10 MG tablet Take 0.5 tablets by mouth 3 times daily (with meals) 120 tablet 3    pantoprazole (PROTONIX) 40 MG tablet Take 1 tablet by mouth every morning (before breakfast) 30 tablet 3    fentaNYL (DURAGESIC) 12 MCG/HR Place 1 patch onto the skin every 72 hours for 15 days.  5 patch 0    etanercept (ENBREL) 50 MG/ML injection Inject into the skin once a week Takes monday      folic acid (FOLVITE) 1 MG tablet TAKE 1 TABLET BY MOUTH ONCE DAILY      predniSONE (DELTASONE) 5 MG tablet Take 5 mg by mouth daily      Calcium Carbonate-Vit D-Min (CALTRATE 600+D PLUS PO) Take by mouth daily      levothyroxine (SYNTHROID) 100 MCG tablet TAKE 1 TABLET BY MOUTH ONCE DAILY      Lifitegrast (XIIDRA) 5 % SOLN Apply 1 drop to eye 2 times daily      oxyCODONE-acetaminophen (PERCOCET) 7.5-325 MG per tablet TAKE 1 2 (ONE HALF) TABLET BY MOUTH EVERY 4 HOURS AS NEEDED AND 1 AT BEDTIME AS NEEDED AS DIRECTED      zoledronic acid (RECLAST) 5 MG/100ML SOLN Infuse 5 mg intravenously once Yearly      Multiple Vitamins-Minerals (CENTRUM ADULTS PO) Take by mouth      Carboxymethylcellul-Glycerin (REFRESH RELIEVA OP) Apply to eye      Pregabalin (LYRICA PO) Take 7 mLs by mouth nightly          Medications patient taking as of now reconciled against medications ordered at time of hospital discharge: Yes    Chief Complaint   Patient presents with    Follow-Up from Hospital     Patient was admitted with intractable nausea and emesis. HPI  Patient is a very pleasant 79-year-old female presents today to follow-up after recent hospitalization for intractable nausea and vomiting and diarrhea she is found to have a likely ileus as well as colitis and was treated with Cipro and Flagyl. She improved after fluid resuscitation gastroenterology was consulted while in hospital as well as pain management. . Symptoms improved and patient was discharged in stable improved condition. Patient is provided a trial of a fentanyl patch due to severe pain and end-stage rheumatoid arthritis she was unable to tolerate this, secondary to nausea but has been encouraged to try it again as side effects may be secondary to antibiotics in combination with the patch. She states she overall feels better and slowly getting her energy back.   She is also getting her appetite back and is eating regular solid meals. Patient is also began to experience lower extremity edema which she states occurs intermittently patient states that symptoms are usually improved first thing in the morning and worsened throughout the day as she allows her legs to hang . She denies any chest discomfort or shortness of breath   inpatient course: Discharge summary reviewed- see chart. Interval history/Current status: improved    Review of Systems   Constitutional: Negative for activity change, appetite change and fatigue. Respiratory: Negative for apnea, cough, chest tightness and shortness of breath. Cardiovascular: Negative for chest pain, palpitations and leg swelling. Gastrointestinal: Positive for constipation. Negative for abdominal pain, blood in stool, diarrhea, nausea and vomiting. Musculoskeletal: Positive for arthralgias. Neurological: Negative for seizures and headaches. Psychiatric/Behavioral: Negative for hallucinations and suicidal ideas. Vitals:    10/15/21 1443   BP: 116/68   Site: Left Upper Arm   Position: Sitting   Cuff Size: Medium Adult   Pulse: 87   Temp: 97.7 °F (36.5 °C)   TempSrc: Temporal   SpO2: 97%   Weight: 73 lb 9.6 oz (33.4 kg)   Height: 5' 3\" (1.6 m)     Body mass index is 13.04 kg/m². Wt Readings from Last 3 Encounters:   10/15/21 73 lb 9.6 oz (33.4 kg)   10/01/21 73 lb 6.6 oz (33.3 kg)   09/22/21 85 lb (38.6 kg)     BP Readings from Last 3 Encounters:   10/15/21 116/68   10/04/21 (!) 103/58   09/22/21 104/60       Physical Exam  Vitals and nursing note reviewed. Constitutional:       General: She is not in acute distress. Appearance: Normal appearance. She is well-developed. She is not diaphoretic. Comments: Underweight  In wheelchair   HENT:      Head: Normocephalic and atraumatic. Right Ear: Tympanic membrane, ear canal and external ear normal. There is no impacted cerumen.       Left Ear: Tympanic membrane, ear canal and external ear normal. There is no impacted cerumen. Nose: Nose normal.      Mouth/Throat:      Mouth: Mucous membranes are moist.      Pharynx: Oropharynx is clear. No oropharyngeal exudate or posterior oropharyngeal erythema. Eyes:      Extraocular Movements: Extraocular movements intact. Conjunctiva/sclera: Conjunctivae normal.      Pupils: Pupils are equal, round, and reactive to light. Cardiovascular:      Rate and Rhythm: Normal rate and regular rhythm. Pulses: Normal pulses. Heart sounds: Normal heart sounds. No murmur heard. No friction rub. No gallop. Pulmonary:      Effort: Pulmonary effort is normal. No respiratory distress. Breath sounds: Normal breath sounds. No wheezing or rales. Chest:      Chest wall: No tenderness. Abdominal:      General: Abdomen is flat. Bowel sounds are normal. There is no distension. Palpations: Abdomen is soft. There is no mass. Tenderness: There is no abdominal tenderness. Hernia: No hernia is present. Musculoskeletal:      Cervical back: Normal range of motion and neck supple. Right lower le+ Edema present. Left lower le+ Edema present. Legs:       Comments: Contractures of bilateral hands and fingers  Inward rotated right foot/ankle contracture   Skin:     General: Skin is warm and dry. Neurological:      Mental Status: She is alert and oriented to person, place, and time. Psychiatric:         Mood and Affect: Mood normal.         Behavior: Behavior normal.         Thought Content: Thought content normal.         Judgment: Judgment normal.             Assessment/Plan:  1. Colitis  Resolved: Patient completed course of antibiotics and symptoms are improved. Recommend follow-up with gastroenterology  - PA DISCHARGE MEDS RECONCILED W/ CURRENT OUTPATIENT MED LIST    2. Anemia, unspecified type  Stable:  We will need to recheck CBC to ensure no further decline  - PA DISCHARGE MEDS RECONCILED W/ CURRENT OUTPATIENT MED LIST  - CBC

## 2021-10-21 ENCOUNTER — HOSPITAL ENCOUNTER (OUTPATIENT)
Dept: LAB | Age: 66
Discharge: HOME | End: 2021-10-21
Payer: MEDICARE

## 2021-10-21 DIAGNOSIS — D64.9 ANEMIA, UNSPECIFIED TYPE: ICD-10-CM

## 2021-10-21 DIAGNOSIS — E87.1 HYPONATREMIA: ICD-10-CM

## 2021-10-21 DIAGNOSIS — M05.79 RHEU ARTHRITIS W RHEU FACTOR MULT SITE W/O ORG/SYS INVOLV (HCC): ICD-10-CM

## 2021-10-21 LAB
ANION GAP SERPL CALCULATED.3IONS-SCNC: 14 MEQ/L (ref 9–15)
ANISOCYTOSIS: ABNORMAL
BASOPHILS ABSOLUTE: 0.1 K/UL (ref 0–0.2)
BASOPHILS RELATIVE PERCENT: 1 %
BUN BLDV-MCNC: 10 MG/DL (ref 8–23)
CALCIUM SERPL-MCNC: 8.5 MG/DL (ref 8.5–9.9)
CHLORIDE BLD-SCNC: 96 MEQ/L (ref 95–107)
CO2: 21 MEQ/L (ref 20–31)
CREAT SERPL-MCNC: 0.41 MG/DL (ref 0.5–0.9)
EOSINOPHILS ABSOLUTE: 0 K/UL (ref 0–0.7)
EOSINOPHILS RELATIVE PERCENT: 0.4 %
GFR AFRICAN AMERICAN: >60
GFR NON-AFRICAN AMERICAN: >60
GLUCOSE BLD-MCNC: 84 MG/DL (ref 70–99)
HCT VFR BLD CALC: 34.2 % (ref 37–47)
HEMOGLOBIN: 10.9 G/DL (ref 12–16)
LYMPHOCYTES ABSOLUTE: 0.9 K/UL (ref 1–4.8)
LYMPHOCYTES RELATIVE PERCENT: 18 %
MCH RBC QN AUTO: 27.5 PG (ref 27–31.3)
MCHC RBC AUTO-ENTMCNC: 31.9 % (ref 33–37)
MCV RBC AUTO: 86.3 FL (ref 82–100)
MONOCYTES ABSOLUTE: 0.7 K/UL (ref 0.2–0.8)
MONOCYTES RELATIVE PERCENT: 14.4 %
NEUTROPHILS ABSOLUTE: 3.3 K/UL (ref 1.4–6.5)
NEUTROPHILS RELATIVE PERCENT: 66 %
PDW BLD-RTO: 25.5 % (ref 11.5–14.5)
PLATELET # BLD: 479 K/UL (ref 130–400)
PLATELET SLIDE REVIEW: ABNORMAL
POTASSIUM SERPL-SCNC: 4.4 MEQ/L (ref 3.4–4.9)
RBC # BLD: 3.97 M/UL (ref 4.2–5.4)
SEDIMENTATION RATE, ERYTHROCYTE: 15 MM (ref 0–30)
SODIUM BLD-SCNC: 131 MEQ/L (ref 135–144)
WBC # BLD: 5 K/UL (ref 4.8–10.8)

## 2021-10-21 PROCEDURE — 36415 COLL VENOUS BLD VENIPUNCTURE: CPT

## 2021-10-21 PROCEDURE — 80048 BASIC METABOLIC PNL TOTAL CA: CPT

## 2021-10-21 PROCEDURE — 85025 COMPLETE CBC W/AUTO DIFF WBC: CPT

## 2021-10-21 PROCEDURE — 85652 RBC SED RATE AUTOMATED: CPT

## 2021-10-22 NOTE — PROGRESS NOTES
Physician Progress Note      PATIENTOle Floor  CSN #:                  [id number]  :                       1955  ADMIT DATE:       10/1/2021 10:40 AM  DISCH DATE:        10/4/2021 4:31 PM  RESPONDING  PROVIDER #:        Nasim RUSH DO          QUERY TEXT:    Patient was admitted with nausea and vomiting with uncertain etiology. Please   see Clinical Indicators below. After study, can the suspected etiology of the   patient's presenting symptoms be further specified as: The medical record reflects the following:  Risk Factors: iron deficiency anemia  BMI 13  severe malnutrition  Clinical Indicators:  CT: \"Multiple fluid-filled loops of small bowel are seen that could represent   ileus. The cerebellum measure up to 2.5 cm in greatest transverse diameter. In   the right lower pelvis there are narrowed loops of bowel visualized and   developing small bowel obstruction is not excluded. Thick walled loops of   large bowel are seen versus incomplete distention that may reflect colitis. \"  10/04 Dr. Ochoa Stauffer: nausea, vomiting, generalized weakness, and diarrhea for   approximately 4 weeks. Patient's symptoms seemed to be resolving 9 days ago,   however they returned so she presented to the ER. Also, with history of   similar symptoms after eating undercooked food 1 year ago, without full   resolution of symptoms since. -Wide differential for patient's symptoms   including acute gastroenteritis (viral or bacterial) or ?postinfectious IBS. Ileus and SBO in differential as well, although less likely as patient is   passing flatus. DS Dr. Mary Chacko: Intractable nausea and vomiting  Possible ileus  Colitis-CT   finding She was hydrated with IV fluid. She was treated for possible ileus in   addition to colitis with Cipro and Flagyl. She improved and tolerated diet. GI seen the patient. No intervention was done.   Treatment: CT  IVF  IV Cipro  IV Flagyl  GI    Lei Fernandes RN ANIKA  [email or web address]  Options provided:  -- Nausea and vomiting due to possible bacterial colitis  -- Nausea and vomiting due to possible viral colitis  -- Nausea and vomiting due to possible ileus  -- Nausea and vomiting due to bacterial colitis and ileus  -- Nausea and vomiting due to bacterial colitis, viral colitis and ileus  -- Other - I will add my own diagnosis  -- Disagree - Not applicable / Not valid  -- Disagree - Clinically unable to determine / Unknown  -- Refer to Clinical Documentation Reviewer    PROVIDER RESPONSE TEXT:    This patient has nausea and vomiting due to possible viral colitis.     Query created by: Nicole Yost on 10/18/2021 8:10 AM      Electronically signed by:  Adriana Raymundo DO 10/22/2021 7:01 AM

## 2021-11-09 ENCOUNTER — HOSPITAL ENCOUNTER (OUTPATIENT)
Dept: ULTRASOUND IMAGING | Age: 66
Discharge: HOME | End: 2021-11-11
Payer: MEDICARE

## 2021-11-09 ENCOUNTER — TELEPHONE (OUTPATIENT)
Dept: FAMILY MEDICINE CLINIC | Age: 66
End: 2021-11-09

## 2021-11-09 DIAGNOSIS — M05.79 RHEU ARTHRITIS W RHEU FACTOR MULT SITE W/O ORG/SYS INVOLV (HCC): Primary | ICD-10-CM

## 2021-11-09 DIAGNOSIS — G89.29 OTHER CHRONIC PAIN: ICD-10-CM

## 2021-11-09 DIAGNOSIS — M62.81 MUSCLE WEAKNESS (GENERALIZED): ICD-10-CM

## 2021-11-09 DIAGNOSIS — R60.0 LOWER EXTREMITY EDEMA: ICD-10-CM

## 2021-11-09 PROCEDURE — 93970 EXTREMITY STUDY: CPT

## 2021-11-09 NOTE — TELEPHONE ENCOUNTER
Patient has been advised by Dr. Ailyn Cortez to have home health care help with medication management, so patient is requesting referral to home care.

## 2021-11-10 DIAGNOSIS — D64.9 CHRONIC ANEMIA: ICD-10-CM

## 2021-11-10 DIAGNOSIS — E87.1 HYPONATREMIA: Primary | ICD-10-CM

## 2021-11-12 ENCOUNTER — TELEPHONE (OUTPATIENT)
Dept: FAMILY MEDICINE CLINIC | Age: 66
End: 2021-11-12

## 2021-11-12 DIAGNOSIS — D64.9 ANEMIA, UNSPECIFIED TYPE: ICD-10-CM

## 2021-11-12 DIAGNOSIS — G89.29 OTHER CHRONIC PAIN: ICD-10-CM

## 2021-11-12 DIAGNOSIS — M79.89 PAIN AND SWELLING OF LOWER EXTREMITY, UNSPECIFIED LATERALITY: ICD-10-CM

## 2021-11-12 DIAGNOSIS — M79.606 PAIN AND SWELLING OF LOWER EXTREMITY, UNSPECIFIED LATERALITY: ICD-10-CM

## 2021-11-12 DIAGNOSIS — M05.79 RHEU ARTHRITIS W RHEU FACTOR MULT SITE W/O ORG/SYS INVOLV (HCC): Primary | ICD-10-CM

## 2021-11-15 RX ORDER — FUROSEMIDE 20 MG/1
10 TABLET ORAL DAILY PRN
Qty: 10 TABLET | Refills: 3 | Status: SHIPPED | OUTPATIENT
Start: 2021-11-15 | End: 2021-11-18 | Stop reason: SDUPTHER

## 2021-11-15 RX ORDER — DOXYCYCLINE HYCLATE 100 MG
100 TABLET ORAL 2 TIMES DAILY
Qty: 20 TABLET | Refills: 0 | Status: SHIPPED | OUTPATIENT
Start: 2021-11-15 | End: 2021-11-16

## 2021-11-16 ENCOUNTER — OFFICE VISIT (OUTPATIENT)
Dept: PALLATIVE CARE | Age: 66
End: 2021-11-16
Payer: MEDICARE

## 2021-11-16 VITALS
OXYGEN SATURATION: 98 % | HEART RATE: 74 BPM | DIASTOLIC BLOOD PRESSURE: 89 MMHG | SYSTOLIC BLOOD PRESSURE: 116 MMHG | RESPIRATION RATE: 20 BRPM

## 2021-11-16 DIAGNOSIS — R60.0 LOWER EXTREMITY EDEMA: Primary | ICD-10-CM

## 2021-11-16 DIAGNOSIS — G89.29 OTHER CHRONIC PAIN: ICD-10-CM

## 2021-11-16 DIAGNOSIS — R60.9 EDEMA, UNSPECIFIED TYPE: ICD-10-CM

## 2021-11-16 DIAGNOSIS — R13.10 DYSPHAGIA, UNSPECIFIED TYPE: ICD-10-CM

## 2021-11-16 DIAGNOSIS — M79.7 FIBROMYALGIA, SECONDARY: ICD-10-CM

## 2021-11-16 DIAGNOSIS — Z79.52 LONG TERM (CURRENT) USE OF SYSTEMIC STEROIDS: ICD-10-CM

## 2021-11-16 DIAGNOSIS — M81.0 AGE-RELATED OSTEOPOROSIS WITHOUT CURRENT PATHOLOGICAL FRACTURE: ICD-10-CM

## 2021-11-16 DIAGNOSIS — M24.50 CONTRACTURE OF JOINT OF MULTIPLE SITES: ICD-10-CM

## 2021-11-16 DIAGNOSIS — R26.9 ABNORMALITY OF GAIT: ICD-10-CM

## 2021-11-16 DIAGNOSIS — Z51.5 ENCOUNTER FOR PALLIATIVE CARE: ICD-10-CM

## 2021-11-16 DIAGNOSIS — M05.79 RHEU ARTHRITIS W RHEU FACTOR MULT SITE W/O ORG/SYS INVOLV (HCC): Primary | ICD-10-CM

## 2021-11-16 PROCEDURE — 1090F PRES/ABSN URINE INCON ASSESS: CPT | Performed by: FAMILY MEDICINE

## 2021-11-16 PROCEDURE — 4040F PNEUMOC VAC/ADMIN/RCVD: CPT | Performed by: FAMILY MEDICINE

## 2021-11-16 PROCEDURE — G8419 CALC BMI OUT NRM PARAM NOF/U: HCPCS | Performed by: FAMILY MEDICINE

## 2021-11-16 PROCEDURE — 1123F ACP DISCUSS/DSCN MKR DOCD: CPT | Performed by: FAMILY MEDICINE

## 2021-11-16 PROCEDURE — G8484 FLU IMMUNIZE NO ADMIN: HCPCS | Performed by: FAMILY MEDICINE

## 2021-11-16 PROCEDURE — 3017F COLORECTAL CA SCREEN DOC REV: CPT | Performed by: FAMILY MEDICINE

## 2021-11-16 PROCEDURE — 1036F TOBACCO NON-USER: CPT | Performed by: FAMILY MEDICINE

## 2021-11-16 PROCEDURE — 99350 HOME/RES VST EST HIGH MDM 60: CPT | Performed by: FAMILY MEDICINE

## 2021-11-16 RX ORDER — PREGABALIN 20 MG/ML
20 SOLUTION ORAL 4 TIMES DAILY
COMMUNITY
Start: 2021-11-08 | End: 2021-11-16 | Stop reason: SDUPTHER

## 2021-11-16 RX ORDER — OXYCODONE AND ACETAMINOPHEN 7.5; 325 MG/1; MG/1
0.5 TABLET ORAL
Qty: 120 TABLET | Refills: 0 | Status: SHIPPED | OUTPATIENT
Start: 2021-11-16 | End: 2021-12-09 | Stop reason: SDUPTHER

## 2021-11-16 RX ORDER — PREGABALIN 20 MG/ML
1 SOLUTION ORAL 4 TIMES DAILY
Qty: 120 ML | Refills: 2 | Status: SHIPPED | OUTPATIENT
Start: 2021-11-16 | End: 2022-03-01 | Stop reason: SDUPTHER

## 2021-11-16 ASSESSMENT — ENCOUNTER SYMPTOMS
GASTROINTESTINAL NEGATIVE: 1
EYES NEGATIVE: 1
COUGH: 0
SORE THROAT: 0
SHORTNESS OF BREATH: 0
COLOR CHANGE: 0
WHEEZING: 0
ALLERGIC/IMMUNOLOGIC NEGATIVE: 1
VOICE CHANGE: 0
NAUSEA: 0
BACK PAIN: 1
CONSTIPATION: 0

## 2021-11-16 NOTE — TELEPHONE ENCOUNTER
Please see if Home health care can arrange for  A blood draw when she is seen tomorrow morning. I spoke to  who states that there is swelling of bilateral legs, and right hand. She was given prednisone by rheumatologist and started 5 day medrol dosepack on Friday and there is no significant improvement . Right foot has more redness and there is a healing small break in skin. I need to check a bmp before starting a diuretic which may be helpful. If worsened, I will send in an antibiotic.  Patient states that she tolerates clindamycin which was given to her by dentist, but due to intermittent diarrhea doxycyline may be more appropriate option

## 2021-11-16 NOTE — TELEPHONE ENCOUNTER
Patient  made aware. Patient  states that they are fine with having her blood draw and will get it done when they come at 10 this morning.

## 2021-11-16 NOTE — PROGRESS NOTES
Subjective:      Patient Id: Seen Mariann Schmid at  home in [place], for initial palliative medicine consult for symptom management, advance care planning and goals of care discussion. She was accompanied to the appointment by: Simeon Concepcion Chief Complaint   Patient presents with    Pain    Other    Leg Swelling    Referral - General      HPI       Nela Dias is a 78 y.o. female referred to palliative care by  for symptom management, advance care planning, and goals of care conversation. Mariann Schmid has complex medical history that includes fibromyagia, OA, End stage RA. Home visit is necessary in lieu of office due to significant frailty and high symptom burden from comorbid illnesses. Patient noted to have multiple contracture sites including bilateral hands and feet makingprimarily chair bound up examination. Reports chronic pain on exam. States pain is ell controlled on current regime. Rates the pain at a Pain Score:   7 on a scale of 0 to 10. States it occurs in joints throughout body. Simeon Concepcion Describes pain as a constant ache that intermittently worsens. Currently taking Percocet 7/325 0.5 tabs every 4 hrs PRN though was increased to miquel 3hrs per Dr Leona Haynes. Family has yet to  script. Unable to use ER medications as takes all pills crushed related to dysphagia. Has tried fentanyl patch though caused extreme nausea and was unable to tolerate. Denies Sedation, constipation, or other adverse effects on current regime. Has a hx of Fibromyalgia with taking oral lyrica concentrate for it. Has BLE edema on exam. L>R. Though BLE US negative for VT so was ordered lasix. Was advised to start lasix after labs and is unsure if [address] to drawlabs later today. Pt is very thin and frail at approx 73 bs though was noted to be 85 on 9/22. Appetite is good. Makes own meals. Tolerating diet and supplements with nutritional shakes as tolerated.  Ambulation and strength reduced though current undergoing home PT for such. Denies significant sleep disturbance, depression, anxiety, or agitation episodes; denies suicidal or homicidal ideation or signs suggesting existential grief or spiritual pain. Past Medical History:   Diagnosis Date    Fibromyalgia     Fracture of pelvis ([address]     left    History of left hip replacement     History of total left knee replacement     History of total right knee replacement     Light sensitivity     bilateral eye    Osteoporosis     [facility]     Sjogrens syndrome ([facility]      Past Surgical History:   Procedure Laterality Date    CATARACT REMOVAL WITH IMPLANT  01/2013    bilateral    CHOLECYSTECTOMY      OTHER SURGICAL HISTORY      bilateral eye implant    OVARY REMOVAL  06/2000    right    REVISION TOTAL HIP ARTHROPLASTY  10/2012    left    TOTAL HIP ARTHROPLASTY  04/1990    left    TOTAL KNEE ARTHROPLASTY  08/1997    right and left     Social History     Socioeconomic History    Marital status:      Spouse name: Not on file    Number of children: Not on file    Years of education: Not on file    Highest education level: Not on file   Occupational History    Not on file   Tobacco Use    Smoking status: Never Smoker    Smokeless tobacco: Never Used   Vaping Use    Vaping Use: Never used   Substance and Sexual Activity    Alcohol use: Never    Drug use: Never    Sexual activity: Not Currently   Other Topics Concern    Not on file   Social History Narrative    Not on file     Social Determinants of Health     Financial Resource Strain: Low Risk     Difficulty of Paying Living Expenses: Not hard at all   Food Insecurity: No Food Insecurity    Worried About Running Out of Food in the Last Year: Never true    [place]yl[place] of Food in the Last Year: Never true   Transportation Needs:     Lack of Transportation (Medical): Not on file    Lack of Transportation (Non-Medical):  Not on file   Physical Activity:     Days of Exercise per Week: Not on file    Minutes of Exercise per Session: Not on file   Stress:     Feeling of Stress : Not on file   Social Connections:     Frequency of Communication with Friends and Family: Not on file    Frequency of Social Gatherings with Friends and Family: Not on file    Attends Adventism Services: Not on file    Active Member of Clubs or Organizations: Not on file    Attends Club or Organization Meetings: Not on file    Marital Status: Not on file   Intimate Partner Violence:     Fear of Current or Ex-Partner: Not on file    Emotionally Abused: Not on file    Physically Abused: Not on file    Sexually Abused: Not on file   Housing Stability:     Unable to Pay for Housing in the Last Year: Not on file    Number of [place] in the Last Year: Not on file    Unstable Housing in the Last Year: Not on file     History reviewed. No pertinent family history. Allergies   Allergen Reactions    Hydrocodone-Acetaminophen Other (See Comments)     Dizzy, Nausea.     Hydroxychloroquine Other (See Comments)     Nausea, stomach upset    Indomethacin Other (See Comments)     Dizziness, nausea, rash and vomiting    Amoxicillin Diarrhea and Nausea And Vomiting    Lactose Nausea And Vomiting    Other      \"Cheap metals\"  rash     Current Outpatient Medications on File Prior to Visit   Medication Sig Dispense Refill    metoclopramide (REGLAN) 10 MG tablet Take 0.5 tablets by mouth 3 times daily (with meals) 120 tablet 3    pantoprazole (PROTONIX) 40 MG tablet Take 1 tablet by mouth every morning (before breakfast) 30 tablet 3    lidocaine 4 % external patch Place 3 patches onto the skin daily 60 patch 1    folic acid (FOLVITE) 1 MG tablet TAKE 1 TABLET BY MOUTH ONCE DAILY      predniSONE (DELTASONE) 5 MG tablet Take 5 mg by mouth daily      Calcium Carbonate-Vit D-Min (CALTRATE 600+D PLUS PO) Take by mouth daily      levothyroxine (SYNTHROID) 100 MCG tablet TAKE 1 TABLET BY MOUTH ONCE DAILY      Lifitegrast (XIIDRA) 5 % SOLN Apply 1 drop to eye 2 times daily      zoledronic acid (RECLAST) 5 MG/100ML SOLN Infuse 5 mg intravenously once Yearly      Multiple Vitamins-Minerals (CENTRUM ADULTS PO) Take by mouth      Carboxymethylcellul-Glycerin (REFRESH RELIEVA OP) Apply to eye      iron sucrose (VENOFER) 20 MG/ML injection Infuse 100 mg intravenously Q 3 months. Had 1 month ago      furosemide (LASIX) 20 MG tablet Take 0.5 tablets by mouth daily as needed (swelling) 10 tablet 3    etanercept (ENBREL) 50 MG/ML injection Inject into the skin once a week Takes monday       No current facility-administered medications on file prior to visit. Review of Systems   Constitutional: Positive for activity change. Negative for appetite change, fatigue, fever and unexpected weight change. HENT: Negative. Negative for sore throat and voice change. Eyes: Negative. Respiratory: Negative for cough, shortness of breath and wheezing. Cardiovascular: Positive for leg swelling. Negative for chest pain. Gastrointestinal: Negative. Negative for constipation and nausea. Endocrine: Negative. Genitourinary: Negative. Musculoskeletal: Positive for arthralgias, back pain and joint swelling. Negative for myalgias. Skin: Negative. Negative for color change and rash. Allergic/Immunologic: Negative. Neurological: Positive for weakness. Negative for dizziness, tremors and syncope. Hematological: Negative. Psychiatric/Behavioral: Negative. Negative for agitation, confusion, decreased concentration and suicidal ideas. The patient is not nervous/anxious. Objective:   /89   Pulse 74   Resp 20   SpO2 98%    Wt Readings from Last 3 Encounters:   10/15/21 73 lb 9.6 oz (33.4 kg)   10/01/21 73 lb 6.6 oz (33.3 kg)   09/22/21 85 lb (38.6 kg)     Physical Exam  Vitals reviewed. Constitutional:       Appearance: She is well-developed. She is ill-appearing. Comments:  Thin and frail appearing   HENT:      Head: Normocephalic. Eyes:      Pupils: Pupils are equal, round, and reactive to light. Cardiovascular:      Rate and Rhythm: Normal rate and regular rhythm. Pulmonary:      Effort: Pulmonary effort is normal.      Breath sounds: Normal breath sounds. Abdominal:      General: Bowel sounds are normal. There is no distension. Palpations: Abdomen is soft. Tenderness: There is no abdominal tenderness. There is no guarding. Musculoskeletal:         General: Normal range of motion. Cervical back: Normal range of motion. Right lower leg: Edema present. Left lower leg: Edema present. Comments: LLE + edema RLE 2+ edema  Contractures of bilateral hands and right foot   Skin:     General: Skin is warm and dry. Neurological:      Mental Status: She is alert and oriented to person, place, and time. Motor: Weakness present. Gait: Gait abnormal.   Psychiatric:         Behavior: Behavior normal.         Assessment and Plan:      1. Age-related osteoporosis without current pathological fracture  2. Rheu arthritis w rheu factor mult site w/o org/sys involv ([address]  3. Contracture of joint of multiple sites  4. Other chronic pain  Controlled on current regime. Called pharmacy nd asked to cancel script on hold with Dr Sandra Rosales and fill percocet sent by me today. - oxyCODONE-acetaminophen (PERCOCET) 7.5-325 MG per tablet; Take 0.5 tablets by mouth every 3 hours as needed for Pain for up to 30 days. Dispense: 120 tablet; Refill: 0  - pregabalin (LYRICA) 20 MG/ML SOLN solution; Take 1 mL by mouth 4 times daily for 90 days. Dispense: 120 mL; Refill: 2    Pt is tolerating current pain meds without adverse effects or over sedation. Lowest effective dose used. Pt advised to call and notify palliative care for any adverse effects or sedation  Pt is able to maintain adequate functional level and participate in ADLs  OARRS reviewed.  There is no indication of aberrant behavior  Pt advised to call for increasing or uncontrolled pain. Risk vs benefit assessed. Pt educated on risk of addiction. Pt advised to take only as prescribed and not to change frequency of pain meds without consulting palliative care first.    5. Fibromyalgia, secondary    - pregabalin (LYRICA) 20 MG/ML SOLN solution; Take 1 mL by mouth 4 times daily for 90 days. Dispense: 120 mL; Refill: 2    6. Dysphagia, unspecified type  Continue diet as tollerated. Small, frequent meals. Maximize calories and protein  Daily nutritional shakes    7. Abnormality of gait  - Continue PT    No change in function from baseline  Reviewed safety and comfort measures  Maintain use of assistive devices PRN    8. Long term (current) use of systemic steroids  GFR WNL    9. Edema, unspecified type   Advised to schedule labs today or tomorrow and start lasix as soon as possiable    10. Encounter for palliative care  - Call for any questions, concerns or change in condition. Much support, guidance and active listening provided. Medications Discontinued During This Encounter   Medication Reason    Pregabalin (LYRICA PO) Therapy completed    doxycycline hyclate (VIBRA-TABS) 100 MG tablet LIST CLEANUP    oxyCODONE-acetaminophen (PERCOCET) 7.5-325 MG per tablet REORDER    pregabalin (LYRICA) 20 MG/ML SOLN solution REORDER        - Advance Care Planning   We discussed Living Will (LW), and [facility]) as well as their activation. Patient choice discussed. LW/HCPOA in place No;Tasked  for assistance with completing paperwork No; Code status discussed Yes; signed No. Code DNR/CCA. All related questions were answered completely. - Goals of Care Discussion:  Disease process and goals of treatment were discussed in basic terms. Brandie's goal is to optimize available comfort care measures. We discussed the palliative care philosophy in light of those goals.  We discussed all care options contingent on treatment response and QOL. Much active listening, presence, and emotional support were given. Discussed with patient/surrogate: POC, Treatment risks/benefits, and alternatives. All questions were answered. Additionally, a printed copy of the CDC medications/opioid safety informational sheet was reviewed and given to patient for reference. Opioid contract signed:Yes    Due to acuity, symptomatology and high-risk medication management, I advised patient to Return in about 1 month (around 12/16/2021), or if symptoms worsen or fail to improve. Thanks for the opportunity you have allowed us to provide palliative care to Karen Elena. We will be in touch as care progresses. Please feel free to reach out to us should you have any questions or requests.     Total Time 60 mins   > 50% Time Spent Counseling/Care coordination yes       ROBIN Garcia - CNP    Collaborating physician: Dr. Iliana Mcdonald

## 2021-11-16 NOTE — TELEPHONE ENCOUNTER
Spoke to  and home health. And verified orders were in the system. They will contact us back to see if labs can be drawn today. They did state that they had somebody coming out tomorrow. If symptoms worsen would advise patient to be seen in the emergency room.   Advise  not to start Lasix until labs are drawn

## 2021-11-17 ENCOUNTER — HOSPITAL ENCOUNTER (OUTPATIENT)
Age: 66
Setting detail: SPECIMEN
Discharge: HOME | End: 2021-11-17
Payer: MEDICARE

## 2021-11-17 ENCOUNTER — TELEPHONE (OUTPATIENT)
Dept: FAMILY MEDICINE CLINIC | Age: 66
End: 2021-11-17

## 2021-11-17 LAB
ANION GAP SERPL CALCULATED.3IONS-SCNC: 15 MEQ/L (ref 9–15)
BUN BLDV-MCNC: 9 MG/DL (ref 8–23)
CALCIUM SERPL-MCNC: 8.8 MG/DL (ref 8.5–9.9)
CHLORIDE BLD-SCNC: 97 MEQ/L (ref 95–107)
CO2: 24 MEQ/L (ref 20–31)
CREAT SERPL-MCNC: 0.49 MG/DL (ref 0.5–0.9)
GFR AFRICAN AMERICAN: >60
GFR NON-AFRICAN AMERICAN: >60
GLUCOSE BLD-MCNC: 134 MG/DL (ref 70–99)
HCT VFR BLD CALC: 39.5 % (ref 37–47)
HEMOGLOBIN: 12.3 G/DL (ref 12–16)
POTASSIUM SERPL-SCNC: 4 MEQ/L (ref 3.4–4.9)
PRO-BNP: 1237 PG/ML
SODIUM BLD-SCNC: 136 MEQ/L (ref 135–144)

## 2021-11-17 PROCEDURE — 85018 HEMOGLOBIN: CPT

## 2021-11-17 PROCEDURE — 80048 BASIC METABOLIC PNL TOTAL CA: CPT

## 2021-11-17 PROCEDURE — 83880 ASSAY OF NATRIURETIC PEPTIDE: CPT

## 2021-11-17 PROCEDURE — 85014 HEMATOCRIT: CPT

## 2021-11-17 NOTE — TELEPHONE ENCOUNTER
Rajni Burrows is requesting a verbal ok for orders for occupational therapy and a home health aide. Please advise.

## 2021-11-18 ENCOUNTER — TELEPHONE (OUTPATIENT)
Dept: PALLATIVE CARE | Age: 66
End: 2021-11-18

## 2021-11-18 ENCOUNTER — OFFICE VISIT (OUTPATIENT)
Dept: CARDIOLOGY CLINIC | Age: 66
End: 2021-11-18
Payer: MEDICARE

## 2021-11-18 VITALS
HEART RATE: 81 BPM | DIASTOLIC BLOOD PRESSURE: 68 MMHG | OXYGEN SATURATION: 99 % | SYSTOLIC BLOOD PRESSURE: 118 MMHG | RESPIRATION RATE: 16 BRPM

## 2021-11-18 DIAGNOSIS — I50.9 ACUTE CONGESTIVE HEART FAILURE, UNSPECIFIED HEART FAILURE TYPE (HCC): Primary | ICD-10-CM

## 2021-11-18 DIAGNOSIS — I10 ESSENTIAL HYPERTENSION: ICD-10-CM

## 2021-11-18 PROCEDURE — G8400 PT W/DXA NO RESULTS DOC: HCPCS | Performed by: INTERNAL MEDICINE

## 2021-11-18 PROCEDURE — 4040F PNEUMOC VAC/ADMIN/RCVD: CPT | Performed by: INTERNAL MEDICINE

## 2021-11-18 PROCEDURE — G8427 DOCREV CUR MEDS BY ELIG CLIN: HCPCS | Performed by: INTERNAL MEDICINE

## 2021-11-18 PROCEDURE — 1123F ACP DISCUSS/DSCN MKR DOCD: CPT | Performed by: INTERNAL MEDICINE

## 2021-11-18 PROCEDURE — 1090F PRES/ABSN URINE INCON ASSESS: CPT | Performed by: INTERNAL MEDICINE

## 2021-11-18 PROCEDURE — 1036F TOBACCO NON-USER: CPT | Performed by: INTERNAL MEDICINE

## 2021-11-18 PROCEDURE — 3017F COLORECTAL CA SCREEN DOC REV: CPT | Performed by: INTERNAL MEDICINE

## 2021-11-18 PROCEDURE — G8419 CALC BMI OUT NRM PARAM NOF/U: HCPCS | Performed by: INTERNAL MEDICINE

## 2021-11-18 PROCEDURE — 93000 ELECTROCARDIOGRAM COMPLETE: CPT | Performed by: INTERNAL MEDICINE

## 2021-11-18 PROCEDURE — 99204 OFFICE O/P NEW MOD 45 MIN: CPT | Performed by: INTERNAL MEDICINE

## 2021-11-18 PROCEDURE — G8484 FLU IMMUNIZE NO ADMIN: HCPCS | Performed by: INTERNAL MEDICINE

## 2021-11-18 RX ORDER — FUROSEMIDE 20 MG/1
10 TABLET ORAL DAILY
Qty: 45 TABLET | Refills: 3 | Status: SHIPPED | OUTPATIENT
Start: 2021-11-18 | End: 2021-12-13 | Stop reason: SDUPTHER

## 2021-11-18 RX ORDER — DOXYCYCLINE HYCLATE 100 MG/1
100 CAPSULE ORAL 2 TIMES DAILY
COMMUNITY
Start: 2021-11-17 | End: 2021-11-27

## 2021-11-18 ASSESSMENT — ENCOUNTER SYMPTOMS
CHEST TIGHTNESS: 0
BLOOD IN STOOL: 0
EYES NEGATIVE: 1
NAUSEA: 0
GASTROINTESTINAL NEGATIVE: 1
COUGH: 0
STRIDOR: 0
WHEEZING: 0
SHORTNESS OF BREATH: 0
RESPIRATORY NEGATIVE: 1

## 2021-11-18 NOTE — PROGRESS NOTES
NEW PATIENT        Patient: Ricky Magallanes  YOB: 1955  MRN: [id number]    Chief Complaint: RA LE Edema. Chief Complaint   Patient presents with   Yousuf Prieto Cardiologist     Dr Zahra Erwin Congestive Heart Failure    Edema     BLE    Pain     h/o RA/Fibromyalgia-sees Dr. Jennie Sherman       CV Data:  11/9/21 No DVT   Subjective/HPI very frail lady with progressive LE edeam weakness and cachexia. She has severe RA. She is minimally active. She has no cp no sob not dizzy. Recent BNP elevated. She is mild HYponatremic as well. Started low dose Lasix yesterdy. Edema has gone down some per pt and . No prior CV history    nonsmoker     EKG: SR 81    Past Medical History:   Diagnosis Date    CHF (congestive heart failure) (HCC)     Fibromyalgia     Fracture of pelvis (HCC)     left    History of left hip replacement     History of total left knee replacement     History of total right knee replacement     Light sensitivity     bilateral eye    Osteoporosis     Rheumatoid arthritis ([address]     Sjogrens syndrome ([address]        Past Surgical History:   Procedure Laterality Date    CATARACT REMOVAL WITH IMPLANT  01/2013    bilateral    CHOLECYSTECTOMY      OTHER SURGICAL HISTORY      bilateral eye implant    OVARY REMOVAL  06/2000    right    REVISION TOTAL HIP ARTHROPLASTY  10/2012    left    TOTAL HIP ARTHROPLASTY  04/1990    left    TOTAL KNEE ARTHROPLASTY  08/1997    right and left       History reviewed. No pertinent family history.     Social History     Socioeconomic History    Marital status:      Spouse name: None    Number of children: None    Years of education: None    Highest education level: None   Occupational History    None   Tobacco Use    Smoking status: Never Smoker    Smokeless tobacco: Never Used   Vaping Use    Vaping Use: Never used   Substance and Sexual Activity    Alcohol use: Never    Drug use: Never    Sexual activity: Not Currently Other Topics Concern    None   Social History Narrative    None     Social Determinants of Health     Financial Resource Strain: Low Risk     Difficulty of Paying Living Expenses: Not hard at all   Food Insecurity: No Food Insecurity    Worried About Running Out of Food in the Last Year: Never true    [place]yl[place] of Food in the Last Year: Never true   Transportation Needs:     Lack of Transportation (Medical): Not on file    Lack of Transportation (Non-Medical): Not on file   Physical Activity:     Days of Exercise per Week: Not on file    Minutes of Exercise per Session: Not on file   Stress:     Feeling of Stress : Not on file   Social Connections:     Frequency of Communication with Friends and Family: Not on file    Frequency of Social Gatherings with Friends and Family: Not on file    Attends Orthodox Services: Not on file    Active Member of [address] [place] [address] or Organizations: Not on file    Attends Club or Organization Meetings: Not on file    Marital Status: Not on file   Intimate Partner Violence:     Fear of Current or Ex-Partner: Not on file    Emotionally Abused: Not on file    Physically Abused: Not on file    Sexually Abused: Not on file   Housing Stability:     Unable to Pay for Housing in the Last Year: Not on file    Number of [place] in the Last Year: Not on file    Unstable Housing in the Last Year: Not on file       Allergies   Allergen Reactions    Hydrocodone-Acetaminophen Other (See Comments)     Dizzy, Nausea.     Hydroxychloroquine Other (See Comments)     Nausea, stomach upset    Indomethacin Other (See Comments)     Dizziness, nausea, rash and vomiting    Amoxicillin Diarrhea and Nausea And Vomiting    Lactose Nausea And Vomiting    Other      \"Cheap metals\"  rash       Current Outpatient Medications   Medication Sig Dispense Refill    doxycycline hyclate (VIBRAMYCIN) 100 MG capsule Take 100 mg by mouth 2 times daily      furosemide (LASIX) 20 MG tablet Take 0.5 tablets by mouth daily 45 tablet 3    oxyCODONE-acetaminophen (PERCOCET) 7.5-325 MG per tablet Take 0.5 tablets by mouth every 3 hours as needed for Pain for up to 30 days. 120 tablet 0    pregabalin (LYRICA) 20 MG/ML SOLN solution Take 1 mL by mouth 4 times daily for 90 days. 120 mL 2    metoclopramide (REGLAN) 10 MG tablet Take 0.5 tablets by mouth 3 times daily (with meals) 120 tablet 3    pantoprazole (PROTONIX) 40 MG tablet Take 1 tablet by mouth every morning (before breakfast) 30 tablet 3    lidocaine 4 % external patch Place 3 patches onto the skin daily 60 patch 1    etanercept (ENBREL) 50 MG/ML injection Inject into the skin once a week Takes monday      folic acid (FOLVITE) 1 MG tablet TAKE 1 TABLET BY MOUTH ONCE DAILY      predniSONE (DELTASONE) 5 MG tablet Take 5 mg by mouth daily      Calcium Carbonate-Vit D-Min (CALTRATE 600+D PLUS PO) Take by mouth daily      levothyroxine (SYNTHROID) 100 MCG tablet TAKE 1 TABLET BY MOUTH ONCE DAILY      Lifitegrast (XIIDRA) 5 % SOLN Apply 1 drop to eye 2 times daily      zoledronic acid (RECLAST) 5 MG/100ML SOLN Infuse 5 mg intravenously once Yearly      Multiple Vitamins-Minerals (CENTRUM ADULTS PO) Take by mouth      Carboxymethylcellul-Glycerin (REFRESH RELIEVA OP) Apply to eye      iron sucrose (VENOFER) 20 MG/ML injection Infuse 100 mg intravenously Q 3 months. Had 1 month ago       No current facility-administered medications for this visit. Review of Systems:   Review of Systems   Constitutional: Positive for fatigue. Negative for diaphoresis. HENT: Negative. Eyes: Negative. Respiratory: Negative. Negative for cough, chest tightness, shortness of breath, wheezing and stridor. Cardiovascular: Negative. Negative for chest pain, palpitations and leg swelling. Gastrointestinal: Negative. Negative for blood in stool and nausea. Genitourinary: Negative. Musculoskeletal: Positive for arthralgias and gait problem.    Skin: Negative. Neurological: Positive for weakness. Negative for dizziness, syncope and light-headedness. Hematological: Negative. Psychiatric/Behavioral: Negative. Physical Examination:    /68 (Site: Right Upper Arm, Position: Sitting, Cuff Size: Medium Adult)   Pulse 81   Resp 16   SpO2 99%    Physical Exam   Constitutional: She appears cachectic. No distress. HENT:   Normal cephalic and Atraumatic   Eyes: Pupils are equal, round, and reactive to light. Neck: Thyroid normal. No JVD present. No neck adenopathy. No thyromegaly present. Cardiovascular: Normal rate, regular rhythm, normal heart sounds, intact distal pulses and normal pulses. Pulmonary/Chest: Effort normal and breath sounds normal. She has no wheezes. She has no rales. She exhibits no tenderness. Abdominal: Soft. Bowel sounds are normal. There is no abdominal tenderness. Musculoskeletal:         General: No tenderness or edema. Normal range of motion. Cervical back: Normal range of motion and neck supple. Neurological: She is alert and oriented to person, place, and time. Skin: Skin is warm. No cyanosis. Nails show no clubbing.        LABS:  CBC:   Lab Results   Component Value Date    WBC 5.0 10/21/2021    RBC 3.97 10/21/2021    HGB 12.3 11/17/2021    HCT 39.5 11/17/2021    MCV 86.3 10/21/2021    MCH 27.5 10/21/2021    MCHC 31.9 10/21/2021    RDW 25.5 10/21/2021     10/21/2021     Lipids:No results found for: CHOL  No results found for: TRIG  No results found for: HDL  No results found for: LDLCHOLESTEROL, LDLCALC  No results found for: LABVLDL, VLDL  No results found for: CHOLHDLRATIO  CMP:    Lab Results   Component Value Date     11/17/2021    K 4.0 11/17/2021    K 2.9 10/02/2021    CL 97 11/17/2021    CO2 24 11/17/2021    BUN 9 11/17/2021    CREATININE 0.49 11/17/2021    GFRAA >60.0 11/17/2021    LABGLOM >60.0 11/17/2021    GLUCOSE 134 11/17/2021    PROT 6.0 10/01/2021    LABALBU 3.1 10/01/2021 CALCIUM 8.8 11/17/2021    BILITOT 0.3 10/01/2021    ALKPHOS 57 10/01/2021    AST 29 10/01/2021    ALT <5 10/01/2021     BMP:    Lab Results   Component Value Date     11/17/2021    K 4.0 11/17/2021    K 2.9 10/02/2021    CL 97 11/17/2021    CO2 24 11/17/2021    BUN 9 11/17/2021    LABALBU 3.1 10/01/2021    CREATININE 0.49 11/17/2021    CALCIUM 8.8 11/17/2021    GFRAA >60.0 11/17/2021    LABGLOM >60.0 11/17/2021    GLUCOSE 134 11/17/2021     Magnesium:    Lab Results   Component Value Date    MG 1.8 10/02/2021     TSH:  Lab Results   Component Value Date    TSH 8.110 (H) 09/22/2021             Patient Active Problem List   Diagnosis    Age-related osteoporosis without current pathological fracture    Anemia, unspecified    Hypothyroidism, unspecified    [facility] w/o org/sys involv (HCC)    Chronic pain    Dry eye syndrome, bilateral    Secondary Sjogren's syndrome (HCC)    Steroid long-term use    Colitis    Generalized muscle ache    Long-term current use of opiate analgesic    Acquired deformity joint foot    Acquired deformity of hand    Long term (current) use of systemic steroids    Muscle weakness (generalized)    Abnormality of gait    Difficulty swallowing    Fibromyalgia, secondary       Medications Discontinued During This Encounter   Medication Reason    furosemide (LASIX) 20 MG tablet REORDER       Modified Medications    Modified Medication Previous Medication    FUROSEMIDE (LASIX) 20 MG TABLET furosemide (LASIX) 20 MG tablet       Take 0.5 tablets by mouth daily    Take 0.5 tablets by mouth daily as needed (swelling)       Orders Placed This Encounter   Medications    furosemide (LASIX) 20 MG tablet     Sig: Take 0.5 tablets by mouth daily     Dispense:  45 tablet     Refill:  3       Assessment/Plan:    1. Acute congestive heart failure, unspecified heart failure type ([address]  continue lasix 10 mg. BMP prior to F/U.  compressin stockings.   - EKG 12 Lead  - Echo 2D w doppler w color complete; Future  - Basic Metabolic Panel; Future    2. Essential hypertension     - Echo 2D w doppler w color complete; Future    3. Need to eat more. Counseling:  Heart Healthy Lifestyle, Improve BMI, Low Salt Diet, Increase Nutritional Intake and Walk Daily    Return for AFTER TESTS.     Electronically signed by Orion Gonzalez MD on 11/18/2021 at 1:43 PM

## 2021-11-18 NOTE — TELEPHONE ENCOUNTER
Pt's PCP Dr Darrell Guardado would like to speak with Jhonny regarding this patient. Please call him back.

## 2021-11-18 NOTE — TELEPHONE ENCOUNTER
Called Dr Tanvir Anaya with no answer.  [facility] with personal cell number for return call

## 2021-11-23 DIAGNOSIS — I50.9 ACUTE CONGESTIVE HEART FAILURE, UNSPECIFIED HEART FAILURE TYPE (HCC): ICD-10-CM

## 2021-11-23 DIAGNOSIS — R94.31 ABNORMAL EKG: Primary | ICD-10-CM

## 2021-12-07 ENCOUNTER — HOSPITAL ENCOUNTER (OUTPATIENT)
Dept: NON INVASIVE DIAGNOSTICS | Age: 66
Discharge: HOME | End: 2021-12-07
Payer: MEDICARE

## 2021-12-07 DIAGNOSIS — R94.31 ABNORMAL EKG: ICD-10-CM

## 2021-12-07 DIAGNOSIS — I50.9 ACUTE CONGESTIVE HEART FAILURE, UNSPECIFIED HEART FAILURE TYPE (HCC): ICD-10-CM

## 2021-12-07 LAB
LV EF: 60 %
LVEF MODALITY: NORMAL

## 2021-12-07 PROCEDURE — 93306 TTE W/DOPPLER COMPLETE: CPT

## 2021-12-09 ENCOUNTER — TELEPHONE (OUTPATIENT)
Dept: PALLATIVE CARE | Age: 66
End: 2021-12-09

## 2021-12-09 DIAGNOSIS — G89.29 OTHER CHRONIC PAIN: ICD-10-CM

## 2021-12-09 DIAGNOSIS — Z51.5 ENCOUNTER FOR PALLIATIVE CARE: ICD-10-CM

## 2021-12-09 DIAGNOSIS — M81.0 AGE-RELATED OSTEOPOROSIS WITHOUT CURRENT PATHOLOGICAL FRACTURE: ICD-10-CM

## 2021-12-09 DIAGNOSIS — M05.79 RHEU ARTHRITIS W RHEU FACTOR MULT SITE W/O ORG/SYS INVOLV (HCC): ICD-10-CM

## 2021-12-09 RX ORDER — OXYCODONE AND ACETAMINOPHEN 7.5; 325 MG/1; MG/1
0.5 TABLET ORAL
Qty: 120 TABLET | Refills: 0 | Status: SHIPPED | OUTPATIENT
Start: 2021-12-09 | End: 2022-01-10 | Stop reason: SDUPTHER

## 2021-12-09 NOTE — TELEPHONE ENCOUNTER
requesting that recent labs BMP, TSH, T3, and T34 be faxed to Dr Guerrero [employer] office. Office number is [phone] Unsure of fax number. Please fax        Also, requesting refill on pain medication. OARRS reviewed and validated. Suspicious activity identified: no. Patient is getting appropriate analgesic effect of treatment; no sedation and other serious reactions noted.

## 2021-12-09 NOTE — TELEPHONE ENCOUNTER
Pt's  called and would like to speak to [facility] [employer].  Please call Tanisha Rodarte back at [phone]

## 2021-12-13 ENCOUNTER — OFFICE VISIT (OUTPATIENT)
Dept: CARDIOLOGY CLINIC | Age: 66
End: 2021-12-13
Payer: MEDICARE

## 2021-12-13 VITALS
RESPIRATION RATE: 16 BRPM | HEART RATE: 73 BPM | SYSTOLIC BLOOD PRESSURE: 114 MMHG | OXYGEN SATURATION: 99 % | DIASTOLIC BLOOD PRESSURE: 66 MMHG

## 2021-12-13 DIAGNOSIS — I50.9 ACUTE CONGESTIVE HEART FAILURE, UNSPECIFIED HEART FAILURE TYPE (HCC): Primary | ICD-10-CM

## 2021-12-13 DIAGNOSIS — I10 ESSENTIAL HYPERTENSION: ICD-10-CM

## 2021-12-13 PROCEDURE — 3017F COLORECTAL CA SCREEN DOC REV: CPT | Performed by: INTERNAL MEDICINE

## 2021-12-13 PROCEDURE — 1090F PRES/ABSN URINE INCON ASSESS: CPT | Performed by: INTERNAL MEDICINE

## 2021-12-13 PROCEDURE — G8419 CALC BMI OUT NRM PARAM NOF/U: HCPCS | Performed by: INTERNAL MEDICINE

## 2021-12-13 PROCEDURE — 99214 OFFICE O/P EST MOD 30 MIN: CPT | Performed by: INTERNAL MEDICINE

## 2021-12-13 PROCEDURE — 1123F ACP DISCUSS/DSCN MKR DOCD: CPT | Performed by: INTERNAL MEDICINE

## 2021-12-13 PROCEDURE — G8484 FLU IMMUNIZE NO ADMIN: HCPCS | Performed by: INTERNAL MEDICINE

## 2021-12-13 PROCEDURE — G8400 PT W/DXA NO RESULTS DOC: HCPCS | Performed by: INTERNAL MEDICINE

## 2021-12-13 PROCEDURE — G8427 DOCREV CUR MEDS BY ELIG CLIN: HCPCS | Performed by: INTERNAL MEDICINE

## 2021-12-13 PROCEDURE — 1036F TOBACCO NON-USER: CPT | Performed by: INTERNAL MEDICINE

## 2021-12-13 PROCEDURE — 4040F PNEUMOC VAC/ADMIN/RCVD: CPT | Performed by: INTERNAL MEDICINE

## 2021-12-13 RX ORDER — FUROSEMIDE 20 MG/1
10 TABLET ORAL EVERY OTHER DAY
Qty: 45 TABLET | Refills: 3
Start: 2021-12-13 | End: 2022-04-12 | Stop reason: SDUPTHER

## 2021-12-13 ASSESSMENT — ENCOUNTER SYMPTOMS
GASTROINTESTINAL NEGATIVE: 1
NAUSEA: 0
COUGH: 0
WHEEZING: 0
STRIDOR: 0
BLOOD IN STOOL: 0
EYES NEGATIVE: 1
SHORTNESS OF BREATH: 0
RESPIRATORY NEGATIVE: 1
CHEST TIGHTNESS: 0

## 2021-12-13 NOTE — PROGRESS NOTES
OFFICE VISIT        Patient: Tiffany Shirley  YOB: 1955  MRN: [id number]    Chief Complaint: RA LE Edema. Chief Complaint   Patient presents with    Results     ECHO       CV Data:  11/9/21 LE Vein No DVT   12/21 Echo EF 60 2+ TR RVSP 39     Subjective/HPI very frail lady with progressive LE edeam weakness and cachexia. She has severe RA. She is minimally active. She has no cp no sob not dizzy. Recent BNP elevated. She is mild Hyponatremic as well. Started low dose Lasix yesterdy. Edema has gone down some per pt and . No prior CV history    nonsmoker     EKG: SR 81    Past Medical History:   Diagnosis Date    CHF (congestive heart failure) (HCC)     Fibromyalgia     Fracture of pelvis (HCC)     left    History of left hip replacement     History of total left knee replacement     History of total right knee replacement     Light sensitivity     bilateral eye    Osteoporosis     Rheumatoid arthritis ([address]     Sjogrens syndrome ([address]        Past Surgical History:   Procedure Laterality Date    CATARACT REMOVAL WITH IMPLANT  01/2013    bilateral    CHOLECYSTECTOMY      OTHER SURGICAL HISTORY      bilateral eye implant    OVARY REMOVAL  06/2000    right    REVISION TOTAL HIP ARTHROPLASTY  10/2012    left    TOTAL HIP ARTHROPLASTY  04/1990    left    TOTAL KNEE ARTHROPLASTY  08/1997    right and left       History reviewed. No pertinent family history.     Social History     Socioeconomic History    Marital status:      Spouse name: None    Number of children: None    Years of education: None    Highest education level: None   Occupational History    None   Tobacco Use    Smoking status: Never Smoker    Smokeless tobacco: Never Used   Vaping Use    Vaping Use: Never used   Substance and Sexual Activity    Alcohol use: Never    Drug use: Never    Sexual activity: Not Currently   Other Topics Concern    None   Social History Narrative    None     Social Determinants of Health     Financial Resource Strain: Low Risk     Difficulty of Paying Living Expenses: Not hard at all   Food Insecurity: No Food Insecurity    Worried About Running Out of Food in the Last Year: Never true    [place] of Food in the Last Year: Never true   Transportation Needs:     Lack of Transportation (Medical): Not on file    Lack of Transportation (Non-Medical): Not on file   Physical Activity:     Days of Exercise per Week: Not on file    Minutes of Exercise per Session: Not on file   Stress:     Feeling of Stress : Not on file   Social Connections:     Frequency of Communication with Friends and Family: Not on file    Frequency of Social Gatherings with Friends and Family: Not on file    Attends Orthodoxy Services: Not on file    Active Member of [address] [place] [address] or Organizations: Not on file    Attends Club or Organization Meetings: Not on file    Marital Status: Not on file   Intimate Partner Violence:     Fear of Current or Ex-Partner: Not on file    Emotionally Abused: Not on file    Physically Abused: Not on file    Sexually Abused: Not on file   Housing Stability:     Unable to Pay for Housing in the Last Year: Not on file    Number of [place] in the Last Year: Not on file    Unstable Housing in the Last Year: Not on file       Allergies   Allergen Reactions    Hydrocodone-Acetaminophen Other (See Comments)     Dizzy, Nausea.     Hydroxychloroquine Other (See Comments)     Nausea, stomach upset    Indomethacin Other (See Comments)     Dizziness, nausea, rash and vomiting    Amoxicillin Diarrhea and Nausea And Vomiting    Lactose Nausea And Vomiting    Other      \"Cheap metals\"  rash       Current Outpatient Medications   Medication Sig Dispense Refill    furosemide (LASIX) 20 MG tablet Take 0.5 tablets by mouth every other day 45 tablet 3    oxyCODONE-acetaminophen (PERCOCET) 7.5-325 MG per tablet Take 0.5 tablets by mouth every 3 hours as needed for Pain for up to 30 days. 120 tablet 0    pregabalin (LYRICA) 20 MG/ML SOLN solution Take 1 mL by mouth 4 times daily for 90 days. 120 mL 2    metoclopramide (REGLAN) 10 MG tablet Take 0.5 tablets by mouth 3 times daily (with meals) 120 tablet 3    pantoprazole (PROTONIX) 40 MG tablet Take 1 tablet by mouth every morning (before breakfast) 30 tablet 3    lidocaine 4 % external patch Place 3 patches onto the skin daily 60 patch 1    etanercept (ENBREL) 50 MG/ML injection Inject into the skin once a week Takes monday      folic acid (FOLVITE) 1 MG tablet TAKE 1 TABLET BY MOUTH ONCE DAILY      predniSONE (DELTASONE) 5 MG tablet Take 5 mg by mouth daily      Calcium Carbonate-Vit D-Min (CALTRATE 600+D PLUS PO) Take by mouth daily      levothyroxine (SYNTHROID) 100 MCG tablet TAKE 1 TABLET BY MOUTH ONCE DAILY      Lifitegrast (XIIDRA) 5 % SOLN Apply 1 drop to eye 2 times daily      zoledronic acid (RECLAST) 5 MG/100ML SOLN Infuse 5 mg intravenously once Yearly      Multiple Vitamins-Minerals (CENTRUM ADULTS PO) Take by mouth      Carboxymethylcellul-Glycerin (REFRESH RELIEVA OP) Apply to eye      iron sucrose (VENOFER) 20 MG/ML injection Infuse 100 mg intravenously Q 3 months. Had 1 month ago       No current facility-administered medications for this visit. Review of Systems:   Review of Systems   Constitutional: Positive for fatigue. Negative for diaphoresis. HENT: Negative. Eyes: Negative. Respiratory: Negative. Negative for cough, chest tightness, shortness of breath, wheezing and stridor. Cardiovascular: Negative. Negative for chest pain, palpitations and leg swelling. Gastrointestinal: Negative. Negative for blood in stool and nausea. Genitourinary: Negative. Musculoskeletal: Positive for arthralgias and gait problem. Skin: Negative. Neurological: Positive for weakness. Negative for dizziness, syncope and light-headedness. Hematological: Negative.     Psychiatric/Behavioral: Negative. Physical Examination:    /66 (Site: Right Upper Arm, Position: Sitting, Cuff Size: Medium Adult)   Pulse 73   Resp 16   SpO2 99%    Physical Exam   Constitutional: She appears cachectic. No distress. HENT:   Normal cephalic and Atraumatic   Eyes: Pupils are equal, round, and reactive to light. Neck: Thyroid normal. No JVD present. No neck adenopathy. No thyromegaly present. Cardiovascular: Normal rate, regular rhythm, normal heart sounds, intact distal pulses and normal pulses. Pulmonary/Chest: Effort normal and breath sounds normal. She has no wheezes. She has no rales. She exhibits no tenderness. Abdominal: Soft. Bowel sounds are normal. There is no abdominal tenderness. Musculoskeletal:         General: No tenderness or edema. Normal range of motion. Cervical back: Normal range of motion and neck supple. Neurological: She is alert and oriented to person, place, and time. Skin: Skin is warm. No cyanosis. Nails show no clubbing.        LABS:  CBC:   Lab Results   Component Value Date    WBC 5.0 10/21/2021    RBC 3.97 10/21/2021    HGB 12.3 11/17/2021    HCT 39.5 11/17/2021    MCV 86.3 10/21/2021    MCH 27.5 10/21/2021    MCHC 31.9 10/21/2021    RDW 25.5 10/21/2021     10/21/2021     Lipids:No results found for: CHOL  No results found for: TRIG  No results found for: HDL  No results found for: LDLCHOLESTEROL, LDLCALC  No results found for: LABVLDL, VLDL  No results found for: CHOLHDLRATIO  CMP:    Lab Results   Component Value Date     12/03/2021    K 4.0 12/03/2021    K 2.9 10/02/2021    CL 98 12/03/2021    CO2 26 12/03/2021    BUN 9 12/03/2021    CREATININE 0.39 12/03/2021    GFRAA >60.0 12/03/2021    LABGLOM >60.0 12/03/2021    GLUCOSE 119 12/03/2021    PROT 6.0 10/01/2021    LABALBU 3.1 10/01/2021    CALCIUM 8.6 12/03/2021    BILITOT 0.3 10/01/2021    ALKPHOS 57 10/01/2021    AST 29 10/01/2021    ALT <5 10/01/2021     BMP:    Lab Results   Component Value Date     12/03/2021    K 4.0 12/03/2021    K 2.9 10/02/2021    CL 98 12/03/2021    CO2 26 12/03/2021    BUN 9 12/03/2021    LABALBU 3.1 10/01/2021    CREATININE 0.39 12/03/2021    CALCIUM 8.6 12/03/2021    GFRAA >60.0 12/03/2021    LABGLOM >60.0 12/03/2021    GLUCOSE 119 12/03/2021     Magnesium:    Lab Results   Component Value Date    MG 1.8 10/02/2021     TSH:  Lab Results   Component Value Date    TSH 2.810 12/03/2021             Patient Active Problem List   Diagnosis    Age-related osteoporosis without current pathological fracture    Anemia, unspecified    Hypothyroidism, unspecified    Rheu arthritis w rheu factor mult site w/o org/sys involv (HCC)    Chronic pain    Dry eye syndrome, bilateral    Secondary Sjogren's syndrome (HCC)    Steroid long-term use    Colitis    Generalized muscle ache    Long-term current use of opiate analgesic    Acquired deformity joint foot    Acquired deformity of hand    Long term (current) use of systemic steroids    Muscle weakness (generalized)    Abnormality of gait    Difficulty swallowing    Fibromyalgia, secondary       Medications Discontinued During This Encounter   Medication Reason    furosemide (LASIX) 20 MG tablet REORDER       Modified Medications    Modified Medication Previous Medication    FUROSEMIDE (LASIX) 20 MG TABLET furosemide (LASIX) 20 MG tablet       Take 0.5 tablets by mouth every other day    Take 0.5 tablets by mouth daily       Orders Placed This Encounter   Medications    furosemide (LASIX) 20 MG tablet     Sig: Take 0.5 tablets by mouth every other day     Dispense:  45 tablet     Refill:  3       Assessment/Plan:    1. Acute congestive heart failure, unspecified heart failure type ([address]  continue lasix 10 mg. BMP prior to F/U.  compressin stockings    - EKG 12 Lead  - Echo 2D w doppler w color complete; Future  - Basic Metabolic Panel; Future    2.  Essential hypertension     - Echo 2D w doppler w color complete; Future - stable. 3. Need to eat more. Counseling:  Heart Healthy Lifestyle, Improve BMI, Low Salt Diet, Increase Nutritional Intake and Walk Daily    Return in about 3 months (around 3/13/2022).     Electronically signed by Nolan Gipson MD on 12/13/2021 at 3:28 PM

## 2021-12-14 ENCOUNTER — OFFICE VISIT (OUTPATIENT)
Dept: PALLATIVE CARE | Age: 66
End: 2021-12-14
Payer: MEDICARE

## 2021-12-14 VITALS
RESPIRATION RATE: 20 BRPM | DIASTOLIC BLOOD PRESSURE: 75 MMHG | OXYGEN SATURATION: 95 % | HEART RATE: 74 BPM | SYSTOLIC BLOOD PRESSURE: 107 MMHG

## 2021-12-14 DIAGNOSIS — G89.29 OTHER CHRONIC PAIN: ICD-10-CM

## 2021-12-14 DIAGNOSIS — R26.9 ABNORMALITY OF GAIT: ICD-10-CM

## 2021-12-14 DIAGNOSIS — R13.10 DYSPHAGIA, UNSPECIFIED TYPE: ICD-10-CM

## 2021-12-14 DIAGNOSIS — I50.9 CHRONIC CONGESTIVE HEART FAILURE, UNSPECIFIED HEART FAILURE TYPE (HCC): ICD-10-CM

## 2021-12-14 DIAGNOSIS — Z79.52 LONG TERM (CURRENT) USE OF SYSTEMIC STEROIDS: ICD-10-CM

## 2021-12-14 DIAGNOSIS — M81.0 AGE-RELATED OSTEOPOROSIS WITHOUT CURRENT PATHOLOGICAL FRACTURE: Primary | ICD-10-CM

## 2021-12-14 DIAGNOSIS — M24.50 CONTRACTURE OF JOINT OF MULTIPLE SITES: ICD-10-CM

## 2021-12-14 DIAGNOSIS — R60.9 EDEMA, UNSPECIFIED TYPE: ICD-10-CM

## 2021-12-14 DIAGNOSIS — M05.79 RHEU ARTHRITIS W RHEU FACTOR MULT SITE W/O ORG/SYS INVOLV (HCC): ICD-10-CM

## 2021-12-14 DIAGNOSIS — Z51.5 ENCOUNTER FOR PALLIATIVE CARE: ICD-10-CM

## 2021-12-14 DIAGNOSIS — M79.7 FIBROMYALGIA, SECONDARY: ICD-10-CM

## 2021-12-14 PROCEDURE — G8419 CALC BMI OUT NRM PARAM NOF/U: HCPCS | Performed by: FAMILY MEDICINE

## 2021-12-14 PROCEDURE — 99349 HOME/RES VST EST MOD MDM 40: CPT | Performed by: FAMILY MEDICINE

## 2021-12-14 PROCEDURE — 1036F TOBACCO NON-USER: CPT | Performed by: FAMILY MEDICINE

## 2021-12-14 PROCEDURE — 4040F PNEUMOC VAC/ADMIN/RCVD: CPT | Performed by: FAMILY MEDICINE

## 2021-12-14 PROCEDURE — G8484 FLU IMMUNIZE NO ADMIN: HCPCS | Performed by: FAMILY MEDICINE

## 2021-12-14 PROCEDURE — 1090F PRES/ABSN URINE INCON ASSESS: CPT | Performed by: FAMILY MEDICINE

## 2021-12-14 PROCEDURE — 1123F ACP DISCUSS/DSCN MKR DOCD: CPT | Performed by: FAMILY MEDICINE

## 2021-12-14 PROCEDURE — 3017F COLORECTAL CA SCREEN DOC REV: CPT | Performed by: FAMILY MEDICINE

## 2021-12-14 ASSESSMENT — ENCOUNTER SYMPTOMS
COLOR CHANGE: 0
EYES NEGATIVE: 1
NAUSEA: 0
ALLERGIC/IMMUNOLOGIC NEGATIVE: 1
SHORTNESS OF BREATH: 0
BACK PAIN: 1
COUGH: 0
WHEEZING: 0
CONSTIPATION: 0
VOICE CHANGE: 0
GASTROINTESTINAL NEGATIVE: 1
SORE THROAT: 0

## 2021-12-14 NOTE — PROGRESS NOTES
ARTHROPLASTY  10/2012    left    TOTAL HIP ARTHROPLASTY  04/1990    left    TOTAL KNEE ARTHROPLASTY  08/1997    right and left     Social History     Socioeconomic History    Marital status:      Spouse name: Not on file    Number of children: Not on file    Years of education: Not on file    Highest education level: Not on file   Occupational History    Not on file   Tobacco Use    Smoking status: Never Smoker    Smokeless tobacco: Never Used   Vaping Use    Vaping Use: Never used   Substance and Sexual Activity    Alcohol use: Never    Drug use: Never    Sexual activity: Not Currently   Other Topics Concern    Not on file   Social History Narrative    Not on file     Social Determinants of Health     Financial Resource Strain: Low Risk     Difficulty of Paying Living Expenses: Not hard at all   Food Insecurity: No Food Insecurity    Worried About [address] in the Last Year: Never true    [address] [employer] in the Last Year: Never true   Transportation Needs:     Lack of Transportation (Medical): Not on file    Lack of Transportation (Non-Medical):  Not on file   Physical Activity:     Days of Exercise per Week: Not on file    Minutes of Exercise per Session: Not on file   Stress:     Feeling of Stress : Not on file   Social Connections:     Frequency of Communication with Friends and Family: Not on file    Frequency of Social Gatherings with Friends and Family: Not on file    Attends Sabianism Services: Not on file    Active Member of Clubs or Organizations: Not on file    Attends Club or Organization Meetings: Not on file    Marital Status: Not on file   Intimate Partner Violence:     Fear of Current or Ex-Partner: Not on file    Emotionally Abused: Not on file    Physically Abused: Not on file    Sexually Abused: Not on file   Housing Stability:     Unable to Pay for Housing in the Last Year: Not on file    Number of [place] in the Last Year: Not on file    Unstable Housing in the Last Year: Not on file     History reviewed. No pertinent family history. Allergies   Allergen Reactions    Hydrocodone-Acetaminophen Other (See Comments)     Dizzy, Nausea.  Hydroxychloroquine Other (See Comments)     Nausea, stomach upset    Indomethacin Other (See Comments)     Dizziness, nausea, rash and vomiting    Amoxicillin Diarrhea and Nausea And Vomiting    Lactose Nausea And Vomiting    Other      \"Cheap metals\"  rash     Current Outpatient Medications on File Prior to Visit   Medication Sig Dispense Refill    furosemide (LASIX) 20 MG tablet Take 0.5 tablets by mouth every other day 45 tablet 3    oxyCODONE-acetaminophen (PERCOCET) 7.5-325 MG per tablet Take 0.5 tablets by mouth every 3 hours as needed for Pain for up to 30 days. 120 tablet 0    pregabalin (LYRICA) 20 MG/ML SOLN solution Take 1 mL by mouth 4 times daily for 90 days.  120 mL 2    metoclopramide (REGLAN) 10 MG tablet Take 0.5 tablets by mouth 3 times daily (with meals) 120 tablet 3    pantoprazole (PROTONIX) 40 MG tablet Take 1 tablet by mouth every morning (before breakfast) 30 tablet 3    lidocaine 4 % external patch Place 3 patches onto the skin daily 60 patch 1    etanercept (ENBREL) 50 MG/ML injection Inject into the skin once a week Takes monday      folic acid (FOLVITE) 1 MG tablet TAKE 1 TABLET BY MOUTH ONCE DAILY      predniSONE (DELTASONE) 5 MG tablet Take 5 mg by mouth daily      Calcium Carbonate-Vit D-Min (CALTRATE 600+D PLUS PO) Take by mouth daily      levothyroxine (SYNTHROID) 100 MCG tablet TAKE 1 TABLET BY MOUTH ONCE DAILY      Lifitegrast (XIIDRA) 5 % SOLN Apply 1 drop to eye 2 times daily      zoledronic acid (RECLAST) 5 MG/100ML SOLN Infuse 5 mg intravenously once Yearly      Multiple Vitamins-Minerals (CENTRUM ADULTS PO) Take by mouth      Carboxymethylcellul-Glycerin (REFRESH RELIEVA OP) Apply to eye      iron sucrose (VENOFER) 20 MG/ML injection Infuse 100 mg intravenously Q 3 months. Had 1 month ago       No current facility-administered medications on file prior to visit. Review of Systems   Constitutional: Positive for activity change. Negative for appetite change, fatigue, fever and unexpected weight change. HENT: Negative. Negative for sore throat and voice change. Eyes: Negative. Respiratory: Negative for cough, shortness of breath and wheezing. Cardiovascular: Positive for leg swelling. Negative for chest pain. Gastrointestinal: Negative. Negative for constipation and nausea. Endocrine: Negative. Genitourinary: Negative. Musculoskeletal: Positive for arthralgias, back pain and joint swelling. Negative for myalgias. Skin: Negative. Negative for color change and rash. Allergic/Immunologic: Negative. Neurological: Positive for weakness. Negative for dizziness, tremors and syncope. Hematological: Negative. Psychiatric/Behavioral: Negative. Negative for agitation, confusion, decreased concentration and suicidal ideas. The patient is not nervous/anxious. Objective:   /75   Pulse 74   Resp 20   SpO2 95%    Wt Readings from Last 3 Encounters:   10/15/21 73 lb 9.6 oz (33.4 kg)   10/01/21 73 lb 6.6 oz (33.3 kg)   09/22/21 85 lb (38.6 kg)     Physical Exam  Vitals reviewed. Constitutional:       Appearance: She is well-developed. She is ill-appearing. Comments: Thin and frail appearing   HENT:      Head: Normocephalic. Eyes:      Pupils: Pupils are equal, round, and reactive to light. Cardiovascular:      Rate and Rhythm: Normal rate and regular rhythm. Pulmonary:      Effort: Pulmonary effort is normal.      Breath sounds: Normal breath sounds. Abdominal:      General: Bowel sounds are normal. There is no distension. Palpations: Abdomen is soft. Tenderness: There is no abdominal tenderness. There is no guarding. Musculoskeletal:         General: Normal range of motion.       Cervical back: Normal range of motion. Right lower leg: Edema present. Left lower leg: Edema present. Comments: Trace BLE edema  Contractures of bilateral hands and right foot   Skin:     General: Skin is warm and dry. Neurological:      Mental Status: She is alert and oriented to person, place, and time. Motor: Weakness present. Gait: Gait abnormal.   Psychiatric:         Behavior: Behavior normal.         Assessment and Plan:      1. Age-related osteoporosis without current pathological fracture  2. Rheu arthritis w rheu factor mult site w/o org/sys involv ([facility]  3. Contracture of joint of multiple sites  4. Other chronic pain  Controlled on current regime. Pt is tolerating current pain meds without adverse effects or over sedation. Lowest effective dose used. Pt advised to call and notify palliative care for any adverse effects or sedation  Pt is able to maintain adequate functional level and participate in ADLs  OARRS reviewed. There is no indication of aberrant behavior  Pt advised to call for increasing or uncontrolled pain. Risk vs benefit assessed. Pt educated on risk of addiction. Pt advised to take only as prescribed and not to change frequency of pain meds without consulting palliative care first.    5. Fibromyalgia, secondary  Controlled on lyrica solution. 6. Dysphagia, unspecified type  Continue diet as tollerated. Small, frequent meals. Maximize calories and protein  Daily nutritional shakes    7. Abnormality of gait  8. Long term (current) use of systemic steroids  - stable disease process    9. Edema, unspecified type  10. Chronic congestive heart failure, unspecified heart failure type ([facility]  Baseline  Spent time reviewing with patient need or diuretic, CHF and RA symptomology and progressive nature.        -Continue diuretic as prescribed  -Daily weights, call if you gain 3 lbs in one day or 5 lbs.  in one week, or for increased edema, sob, cough, orthopnea, or chest pain  -Elevate BLE while in dependent position  -Avoid added salt; reviewed basic skin care.  -Compression socks on during day, off at night  -Maintain follow up with cardiology-    11. Encounter for palliative care  - Call for any questions, concerns or change in condition. Much support, guidance and active listening provided. There are no discontinued medications. Due to acuity, symptomatology and high-risk medication management, I advised patient to Return in about 1 month (around 1/14/2022), or if symptoms worsen or fail to improve.        Total Time 40 mins   > 50% Time Spent Counseling/Care coordination yes       ROBIN Mendez - CNP    Collaborating physician: Dr. Salbador German

## 2022-01-03 ENCOUNTER — TELEPHONE (OUTPATIENT)
Dept: PALLATIVE CARE | Age: 67
End: 2022-01-03

## 2022-01-03 NOTE — TELEPHONE ENCOUNTER
Pt's  would like to know if Nonnie Socks would be willing to write a letter stating that Magalys Holden can not participate in jury duty. Pt's  has also contacted her PCP about this.

## 2022-01-03 NOTE — LETTER
Patient name: Cassandra Lechuga  : 1955  Phone: [phone]       To whom it may concern, This patient can not participate in jury duty as she is primarily home bound related high symptom burden from comorbid illnesses. Please contact office with further questions and concerns.                                                               Sincerely, Seth Ivory [facility]                                                                             Office Number: [phone]

## 2022-01-03 NOTE — TELEPHONE ENCOUNTER
I could do on Wednesday when Im in the office. Would not be an issue. If needs sooner can go through PCP.

## 2022-01-10 DIAGNOSIS — M81.0 AGE-RELATED OSTEOPOROSIS WITHOUT CURRENT PATHOLOGICAL FRACTURE: ICD-10-CM

## 2022-01-10 DIAGNOSIS — M05.79 RHEU ARTHRITIS W RHEU FACTOR MULT SITE W/O ORG/SYS INVOLV (HCC): ICD-10-CM

## 2022-01-10 DIAGNOSIS — G89.29 OTHER CHRONIC PAIN: ICD-10-CM

## 2022-01-10 DIAGNOSIS — Z51.5 ENCOUNTER FOR PALLIATIVE CARE: ICD-10-CM

## 2022-01-10 RX ORDER — OXYCODONE AND ACETAMINOPHEN 7.5; 325 MG/1; MG/1
0.5 TABLET ORAL
Qty: 120 TABLET | Refills: 0 | Status: SHIPPED | OUTPATIENT
Start: 2022-01-10 | End: 2022-02-07 | Stop reason: SDUPTHER

## 2022-01-10 NOTE — TELEPHONE ENCOUNTER
Rx requested:  Requested Prescriptions     Pending Prescriptions Disp Refills    oxyCODONE-acetaminophen (PERCOCET) 7.5-325 MG per tablet 120 tablet 0     Sig: Take 0.5 tablets by mouth every 3 hours as needed for Pain for up to 30 days.        Last Office Visit:   12/14/2021      Last filled:  12/9/2021    Next Visit Date:  Future Appointments   Date Time Provider [place]   1/18/2022  3:00 PM [facility]   3/16/2022  3:30 PM Wade Lisa MD [address] [place] [address]

## 2022-01-18 ENCOUNTER — OFFICE VISIT (OUTPATIENT)
Dept: PALLATIVE CARE | Age: 67
End: 2022-01-18
Payer: MEDICARE

## 2022-01-18 VITALS
SYSTOLIC BLOOD PRESSURE: 104 MMHG | DIASTOLIC BLOOD PRESSURE: 68 MMHG | RESPIRATION RATE: 20 BRPM | OXYGEN SATURATION: 97 % | HEART RATE: 85 BPM

## 2022-01-18 DIAGNOSIS — R13.10 DYSPHAGIA, UNSPECIFIED TYPE: ICD-10-CM

## 2022-01-18 DIAGNOSIS — M05.79 RHEU ARTHRITIS W RHEU FACTOR MULT SITE W/O ORG/SYS INVOLV (HCC): ICD-10-CM

## 2022-01-18 DIAGNOSIS — M81.0 AGE-RELATED OSTEOPOROSIS WITHOUT CURRENT PATHOLOGICAL FRACTURE: ICD-10-CM

## 2022-01-18 DIAGNOSIS — R60.9 EDEMA, UNSPECIFIED TYPE: ICD-10-CM

## 2022-01-18 DIAGNOSIS — K21.9 GASTROESOPHAGEAL REFLUX DISEASE, UNSPECIFIED WHETHER ESOPHAGITIS PRESENT: Primary | ICD-10-CM

## 2022-01-18 DIAGNOSIS — Z51.5 ENCOUNTER FOR PALLIATIVE CARE: ICD-10-CM

## 2022-01-18 DIAGNOSIS — M24.50 CONTRACTURE OF JOINT OF MULTIPLE SITES: ICD-10-CM

## 2022-01-18 DIAGNOSIS — Z79.52 LONG TERM (CURRENT) USE OF SYSTEMIC STEROIDS: ICD-10-CM

## 2022-01-18 DIAGNOSIS — I50.9 CHRONIC CONGESTIVE HEART FAILURE, UNSPECIFIED HEART FAILURE TYPE (HCC): ICD-10-CM

## 2022-01-18 DIAGNOSIS — G89.29 OTHER CHRONIC PAIN: ICD-10-CM

## 2022-01-18 DIAGNOSIS — R26.9 ABNORMALITY OF GAIT: ICD-10-CM

## 2022-01-18 DIAGNOSIS — M79.7 FIBROMYALGIA, SECONDARY: ICD-10-CM

## 2022-01-18 PROCEDURE — G8484 FLU IMMUNIZE NO ADMIN: HCPCS | Performed by: FAMILY MEDICINE

## 2022-01-18 PROCEDURE — 3017F COLORECTAL CA SCREEN DOC REV: CPT | Performed by: FAMILY MEDICINE

## 2022-01-18 PROCEDURE — 1090F PRES/ABSN URINE INCON ASSESS: CPT | Performed by: FAMILY MEDICINE

## 2022-01-18 PROCEDURE — 99349 HOME/RES VST EST MOD MDM 40: CPT | Performed by: FAMILY MEDICINE

## 2022-01-18 PROCEDURE — 4040F PNEUMOC VAC/ADMIN/RCVD: CPT | Performed by: FAMILY MEDICINE

## 2022-01-18 PROCEDURE — 1123F ACP DISCUSS/DSCN MKR DOCD: CPT | Performed by: FAMILY MEDICINE

## 2022-01-18 PROCEDURE — G8419 CALC BMI OUT NRM PARAM NOF/U: HCPCS | Performed by: FAMILY MEDICINE

## 2022-01-18 PROCEDURE — 1036F TOBACCO NON-USER: CPT | Performed by: FAMILY MEDICINE

## 2022-01-18 RX ORDER — PANTOPRAZOLE SODIUM 40 MG/1
40 TABLET, DELAYED RELEASE ORAL
Qty: 30 TABLET | Refills: 11 | Status: SHIPPED | OUTPATIENT
Start: 2022-01-18

## 2022-01-18 ASSESSMENT — ENCOUNTER SYMPTOMS
VOICE CHANGE: 0
GASTROINTESTINAL NEGATIVE: 1
NAUSEA: 0
COLOR CHANGE: 0
BACK PAIN: 1
ALLERGIC/IMMUNOLOGIC NEGATIVE: 1
COUGH: 0
WHEEZING: 0
SORE THROAT: 0
EYES NEGATIVE: 1
SHORTNESS OF BREATH: 0
CONSTIPATION: 0

## 2022-01-18 NOTE — PROGRESS NOTES
Subjective:      Patient Id: Seen Marilu Burnham at  [facility] for symptom management. She was accompanied to the appointment by: her   Chief Complaint   Patient presents with    Pain    Other     GERD, neuropathy    Follow-up      HPI       Bam Mead is a 78 y.o. female seen for symptom management. Marilu Burnham has complex medical history that includes fibromyagia, OA, and End stage RA with multiple contractures. Home visit is necessary in lieu of office due to significant frailty and high symptom burden from comorbid illnesses. States pain is well controlled on current regime. Rates the pain at a Pain Score:   4 on a scale of 0 to 10. States it occurs in joints throughout body. Describes pain as a constant ache that intermittently worsens. Currently taking Percocet 7.5/325 0.5 tabs every 3hrs PRN as unable to use ER medications as takes all pills crushed related to dysphagia and did not tolerate ER patches for pain. Denies Sedation, constipation, or other adverse effects on current regime. Fibromyalgia and neuropathic pain remains well controlled on oral lyrica concentrate. Edema at baseline. Taking diuretics as ordered. Denies excessive fatigue, fever, chills, myalgias, increased sputum production or cough, nausea, vomiting, chest pain, or orthopnea. Weight subjectively stable     Remains having intermittent GERD symptomology controlled well on protonix. Requesting refill.      Past Medical History:   Diagnosis Date    CHF (congestive heart failure) (HCC)     Fibromyalgia     Fracture of pelvis (HCC)     left    History of left hip replacement     History of total left knee replacement     History of total right knee replacement     Light sensitivity     bilateral eye    Osteoporosis     Rheumatoid arthritis ([address]     Sjogrens syndrome ([address]      Past Surgical History:   Procedure Laterality Date    CATARACT REMOVAL WITH IMPLANT  01/2013    bilateral    CHOLECYSTECTOMY      OTHER SURGICAL HISTORY      bilateral eye implant    OVARY REMOVAL  06/2000    right    REVISION TOTAL HIP ARTHROPLASTY  10/2012    left    TOTAL HIP ARTHROPLASTY  04/1990    left    TOTAL KNEE ARTHROPLASTY  08/1997    right and left     Social History     Socioeconomic History    Marital status:      Spouse name: Not on file    Number of children: Not on file    Years of education: Not on file    Highest education level: Not on file   Occupational History    Not on file   Tobacco Use    Smoking status: Never Smoker    Smokeless tobacco: Never Used   Vaping Use    Vaping Use: Never used   Substance and Sexual Activity    Alcohol use: Never    Drug use: Never    Sexual activity: Not Currently   Other Topics Concern    Not on file   Social History Narrative    Not on file     Social Determinants of Health     Financial Resource Strain: Low Risk     Difficulty of Paying Living Expenses: Not hard at all   Food Insecurity: No Food Insecurity    Worried About [address] in the Last Year: Never true    [address] [facility] [employer] in the Last Year: Never true   Transportation Needs:     Lack of Transportation (Medical): Not on file    Lack of Transportation (Non-Medical):  Not on file   Physical Activity:     Days of Exercise per Week: Not on file    Minutes of Exercise per Session: Not on file   Stress:     Feeling of Stress : Not on file   Social Connections:     Frequency of Communication with Friends and Family: Not on file    Frequency of Social Gatherings with Friends and Family: Not on file    Attends Anabaptism Services: Not on file    Active Member of Clubs or Organizations: Not on file    Attends Club or Organization Meetings: Not on file    Marital Status: Not on file   Intimate Partner Violence:     Fear of Current or Ex-Partner: Not on file    Emotionally Abused: Not on file    Physically Abused: Not on file    Sexually Abused: Not on file   Housing Stability:     Unable to Pay for Housing in the Last Year: Not on file    Number of Places Lived in the Last Year: Not on file    Unstable Housing in the Last Year: Not on file     History reviewed. No pertinent family history. Allergies   Allergen Reactions    Hydrocodone-Acetaminophen Other (See Comments)     Dizzy, Nausea.  Hydroxychloroquine Other (See Comments)     Nausea, stomach upset    Indomethacin Other (See Comments)     Dizziness, nausea, rash and vomiting    Amoxicillin Diarrhea and Nausea And Vomiting    Lactose Nausea And Vomiting    Other      \"Cheap metals\"  rash     Current Outpatient Medications on File Prior to Visit   Medication Sig Dispense Refill    oxyCODONE-acetaminophen (PERCOCET) 7.5-325 MG per tablet Take 0.5 tablets by mouth every 3 hours as needed for Pain for up to 30 days. 120 tablet 0    furosemide (LASIX) 20 MG tablet Take 0.5 tablets by mouth every other day 45 tablet 3    pregabalin (LYRICA) 20 MG/ML SOLN solution Take 1 mL by mouth 4 times daily for 90 days.  120 mL 2    metoclopramide (REGLAN) 10 MG tablet Take 0.5 tablets by mouth 3 times daily (with meals) 120 tablet 3    lidocaine 4 % external patch Place 3 patches onto the skin daily 60 patch 1    etanercept (ENBREL) 50 MG/ML injection Inject into the skin once a week Takes monday      folic acid (FOLVITE) 1 MG tablet TAKE 1 TABLET BY MOUTH ONCE DAILY      predniSONE (DELTASONE) 5 MG tablet Take 5 mg by mouth daily      Calcium Carbonate-Vit D-Min (CALTRATE 600+D PLUS PO) Take by mouth daily      levothyroxine (SYNTHROID) 100 MCG tablet TAKE 1 TABLET BY MOUTH ONCE DAILY      Lifitegrast (XIIDRA) 5 % SOLN Apply 1 drop to eye 2 times daily      zoledronic acid (RECLAST) 5 MG/100ML SOLN Infuse 5 mg intravenously once Yearly      Multiple Vitamins-Minerals (CENTRUM ADULTS PO) Take by mouth      Carboxymethylcellul-Glycerin (REFRESH RELIEVA OP) Apply to eye      iron sucrose (VENOFER) 20 MG/ML injection Infuse 100 mg intravenously Q 3 months. Had 1 month ago       No current facility-administered medications on file prior to visit. Review of Systems   Constitutional: Positive for activity change. Negative for appetite change, fatigue, fever and unexpected weight change. HENT: Negative. Negative for sore throat and voice change. Eyes: Negative. Respiratory: Negative for cough, shortness of breath and wheezing. Cardiovascular: Positive for leg swelling. Negative for chest pain. Gastrointestinal: Negative. Negative for constipation and nausea. Endocrine: Negative. Genitourinary: Negative. Musculoskeletal: Positive for arthralgias, back pain and joint swelling. Negative for myalgias. Skin: Negative. Negative for color change and rash. Allergic/Immunologic: Negative. Neurological: Positive for weakness. Negative for dizziness, tremors and syncope. Hematological: Negative. Psychiatric/Behavioral: Negative. Negative for agitation, confusion, decreased concentration and suicidal ideas. The patient is not nervous/anxious. Objective:   /68   Pulse 85   Resp 20   SpO2 97%    Wt Readings from Last 3 Encounters:   10/15/21 73 lb 9.6 oz (33.4 kg)   10/01/21 73 lb 6.6 oz (33.3 kg)   09/22/21 85 lb (38.6 kg)     Physical Exam  Vitals reviewed. Constitutional:       Appearance: She is well-developed. She is ill-appearing. Comments: Thin and frail appearing   HENT:      Head: Normocephalic. Eyes:      Pupils: Pupils are equal, round, and reactive to light. Cardiovascular:      Rate and Rhythm: Normal rate and regular rhythm. Pulmonary:      Effort: Pulmonary effort is normal.      Breath sounds: Normal breath sounds. Abdominal:      General: Bowel sounds are normal. There is no distension. Palpations: Abdomen is soft. Tenderness: There is no abdominal tenderness. There is no guarding. Musculoskeletal:         General: Normal range of motion.       Cervical back: Normal range of motion. Right lower leg: Edema present. Left lower leg: Edema present. Comments: Trace BLE edema  Contractures of bilateral hands and right foot   Skin:     General: Skin is warm and dry. Neurological:      Mental Status: She is alert and oriented to person, place, and time. Motor: Weakness present. Gait: Gait abnormal.   Psychiatric:         Behavior: Behavior normal.         Assessment and Plan:      1. Age-related osteoporosis without current pathological fracture  2. Rheu arthritis w rheu factor mult site w/o org/sys involv ([facility]  3. Contracture of joint of multiple sites  4. Other chronic pain  Controlled on current regime. Pt is tolerating current pain meds without adverse effects or over sedation. Lowest effective dose used. Pt advised to call and notify palliative care for any adverse effects or sedation  Pt is able to maintain adequate functional level and participate in ADLs  OARRS reviewed. There is no indication of aberrant behavior  Pt advised to call for increasing or uncontrolled pain. Risk vs benefit assessed. Pt educated on risk of addiction. Pt advised to take only as prescribed and not to change frequency of pain meds without consulting palliative care first.    5. Fibromyalgia, secondary  Controlled on lyrica solution. 6. Dysphagia, unspecified type  Continue diet as tollerated. Small, frequent meals. Maximize calories and protein  Daily nutritional shakes    7. Abnormality of gait  8. Long term (current) use of systemic steroids  - stable disease process    9. Edema, unspecified type  10. Chronic congestive heart failure, unspecified heart failure type ([facility]  Baseline     -Continue diuretic as prescribed  -Daily weights, call if you gain 3 lbs in one day or 5 lbs.  in one week, or for increased edema, sob, cough, orthopnea, or chest pain  -Elevate BLE while in dependent position  -Avoid added salt; reviewed basic skin care.  -Compression socks on during day, off at night  -Maintain follow up with cardiology-    11. Gastroesophageal reflux disease, unspecified whether esophagitis present    - pantoprazole (PROTONIX) 40 MG tablet; Take 1 tablet by mouth every morning (before breakfast)  Dispense: 30 tablet; Refill: 11    12. Encounter for palliative care  - Call for any questions, concerns or change in condition. Much support, guidance and active listening provided. Medications Discontinued During This Encounter   Medication Reason    pantoprazole (PROTONIX) 40 MG tablet REORDER         Due to acuity, symptomatology and high-risk medication management, I advised patient to Return in about 1 month (around 2/18/2022), or if symptoms worsen or fail to improve.        Total Time 40 mins   > 50% Time Spent Counseling/Care coordination yes       ROBIN Tyson - CNP    Collaborating physician: Dr. Jimmy Oneill

## 2022-02-07 DIAGNOSIS — Z51.5 ENCOUNTER FOR PALLIATIVE CARE: ICD-10-CM

## 2022-02-07 DIAGNOSIS — G89.29 OTHER CHRONIC PAIN: ICD-10-CM

## 2022-02-07 DIAGNOSIS — M81.0 AGE-RELATED OSTEOPOROSIS WITHOUT CURRENT PATHOLOGICAL FRACTURE: ICD-10-CM

## 2022-02-07 DIAGNOSIS — M05.79 RHEU ARTHRITIS W RHEU FACTOR MULT SITE W/O ORG/SYS INVOLV (HCC): ICD-10-CM

## 2022-02-07 RX ORDER — OXYCODONE AND ACETAMINOPHEN 7.5; 325 MG/1; MG/1
0.5 TABLET ORAL
Qty: 120 TABLET | Refills: 0 | Status: SHIPPED | OUTPATIENT
Start: 2022-02-07 | End: 2022-03-01

## 2022-02-07 NOTE — TELEPHONE ENCOUNTER
Rx requested:  Requested Prescriptions     Pending Prescriptions Disp Refills    oxyCODONE-acetaminophen (PERCOCET) 7.5-325 MG per tablet 120 tablet 0     Sig: Take 0.5 tablets by mouth every 3 hours as needed for Pain for up to 30 days.        Last Office Visit:   1/18/2022      Last filled:  1/10/22    Next Visit Date:  Future Appointments   Date Time Provider [place]   3/1/2022  3:00 PM [facility]   3/16/2022  3:30 PM Susan Navas MD [facility]

## 2022-03-01 ENCOUNTER — OFFICE VISIT (OUTPATIENT)
Dept: PALLATIVE CARE | Age: 67
End: 2022-03-01
Payer: MEDICARE

## 2022-03-01 VITALS
DIASTOLIC BLOOD PRESSURE: 67 MMHG | OXYGEN SATURATION: 98 % | HEART RATE: 73 BPM | SYSTOLIC BLOOD PRESSURE: 143 MMHG | RESPIRATION RATE: 20 BRPM

## 2022-03-01 DIAGNOSIS — M79.7 FIBROMYALGIA, SECONDARY: ICD-10-CM

## 2022-03-01 DIAGNOSIS — M05.79 RHEU ARTHRITIS W RHEU FACTOR MULT SITE W/O ORG/SYS INVOLV (HCC): ICD-10-CM

## 2022-03-01 DIAGNOSIS — Z51.5 ENCOUNTER FOR PALLIATIVE CARE: ICD-10-CM

## 2022-03-01 DIAGNOSIS — K21.9 GASTROESOPHAGEAL REFLUX DISEASE, UNSPECIFIED WHETHER ESOPHAGITIS PRESENT: ICD-10-CM

## 2022-03-01 DIAGNOSIS — M81.0 AGE-RELATED OSTEOPOROSIS WITHOUT CURRENT PATHOLOGICAL FRACTURE: ICD-10-CM

## 2022-03-01 DIAGNOSIS — G89.29 OTHER CHRONIC PAIN: ICD-10-CM

## 2022-03-01 DIAGNOSIS — Z79.52 LONG TERM (CURRENT) USE OF SYSTEMIC STEROIDS: ICD-10-CM

## 2022-03-01 DIAGNOSIS — R13.10 DYSPHAGIA, UNSPECIFIED TYPE: ICD-10-CM

## 2022-03-01 DIAGNOSIS — I50.9 CHRONIC CONGESTIVE HEART FAILURE, UNSPECIFIED HEART FAILURE TYPE (HCC): ICD-10-CM

## 2022-03-01 DIAGNOSIS — M24.50 CONTRACTURE OF JOINT OF MULTIPLE SITES: Primary | ICD-10-CM

## 2022-03-01 DIAGNOSIS — R60.9 EDEMA, UNSPECIFIED TYPE: ICD-10-CM

## 2022-03-01 DIAGNOSIS — R26.9 ABNORMALITY OF GAIT: ICD-10-CM

## 2022-03-01 PROCEDURE — 99350 HOME/RES VST EST HIGH MDM 60: CPT | Performed by: FAMILY MEDICINE

## 2022-03-01 PROCEDURE — G8484 FLU IMMUNIZE NO ADMIN: HCPCS | Performed by: FAMILY MEDICINE

## 2022-03-01 PROCEDURE — 4040F PNEUMOC VAC/ADMIN/RCVD: CPT | Performed by: FAMILY MEDICINE

## 2022-03-01 PROCEDURE — 1123F ACP DISCUSS/DSCN MKR DOCD: CPT | Performed by: FAMILY MEDICINE

## 2022-03-01 PROCEDURE — G8419 CALC BMI OUT NRM PARAM NOF/U: HCPCS | Performed by: FAMILY MEDICINE

## 2022-03-01 PROCEDURE — 1036F TOBACCO NON-USER: CPT | Performed by: FAMILY MEDICINE

## 2022-03-01 PROCEDURE — 1090F PRES/ABSN URINE INCON ASSESS: CPT | Performed by: FAMILY MEDICINE

## 2022-03-01 PROCEDURE — 3017F COLORECTAL CA SCREEN DOC REV: CPT | Performed by: FAMILY MEDICINE

## 2022-03-01 RX ORDER — PREGABALIN 20 MG/ML
2 SOLUTION ORAL 4 TIMES DAILY
Qty: 240 ML | Refills: 2 | Status: SHIPPED | OUTPATIENT
Start: 2022-03-01 | End: 2022-05-27 | Stop reason: SDUPTHER

## 2022-03-01 RX ORDER — OXYCODONE AND ACETAMINOPHEN 10; 325 MG/1; MG/1
0.5 TABLET ORAL
Qty: 180 TABLET | Refills: 0 | Status: SHIPPED | OUTPATIENT
Start: 2022-03-01 | End: 2022-03-18 | Stop reason: ALTCHOICE

## 2022-03-01 ASSESSMENT — ENCOUNTER SYMPTOMS
NAUSEA: 0
WHEEZING: 0
GASTROINTESTINAL NEGATIVE: 1
SORE THROAT: 0
COUGH: 0
VOICE CHANGE: 0
CONSTIPATION: 0
COLOR CHANGE: 0
EYES NEGATIVE: 1
BACK PAIN: 1
ALLERGIC/IMMUNOLOGIC NEGATIVE: 1
SHORTNESS OF BREATH: 0

## 2022-03-01 NOTE — PROGRESS NOTES
Subjective:      Patient Id: Seen Aishwarya Velazquez at  [facility] for symptom management. She was accompanied to the appointment by: her   Chief Complaint   Patient presents with    Pain    Other    Follow-up      HPI       Presley Gomez is a 78 y.o. female seen for symptom management. Aishwarya Velazquez has complex medical history that includes fibromyagia, OA, and End stage RA with multiple contractures. Home visit is necessary in lieu of office due to significant frailty and high symptom burden from comorbid illnesses. States pain is exacerbated on current regime. Rates the pain at a Pain Score:   9 on a scale of 0 to 10. States it occurs in joints throughout body. Hx Ra with multiple Contractures of joints noted throughout body. Describes pain as a constant ache that intermittently worsens. Currently taking Percocet 7.5/325 0.5 tabs every 3hrs PRN and states it relieves pain though not enough. Denies Sedation, constipation, or other adverse effects on current regime. Fibromyalgia and neuropathic pain also exacerbated on oral lyrica concentrate likely related to progression of RA and CHF. Edema at baseline. Taking diuretics as ordered. Denies excessive fatigue, fever, chills, myalgias, increased sputum production or cough, nausea, vomiting, chest pain, or orthopnea.     Weight subjectively stable    Past Medical History:   Diagnosis Date    CHF (congestive heart failure) (HCC)     Fibromyalgia     Fracture of pelvis (HCC)     left    History of left hip replacement     History of total left knee replacement     History of total right knee replacement     Light sensitivity     bilateral eye    Osteoporosis     Rheumatoid arthritis ([address]     Sjogrens syndrome ([address]      Past Surgical History:   Procedure Laterality Date    CATARACT REMOVAL WITH IMPLANT  01/2013    bilateral    CHOLECYSTECTOMY      OTHER SURGICAL HISTORY      bilateral eye implant    OVARY REMOVAL  06/2000    right    REVISION TOTAL HIP ARTHROPLASTY  10/2012    left    TOTAL HIP ARTHROPLASTY  04/1990    left    TOTAL KNEE ARTHROPLASTY  08/1997    right and left     Social History     Socioeconomic History    Marital status:      Spouse name: Not on file    Number of children: Not on file    Years of education: Not on file    Highest education level: Not on file   Occupational History    Not on file   Tobacco Use    Smoking status: Never Smoker    Smokeless tobacco: Never Used   Vaping Use    Vaping Use: Never used   Substance and Sexual Activity    Alcohol use: Never    Drug use: Never    Sexual activity: Not Currently   Other Topics Concern    Not on file   Social History Narrative    Not on file     Social Determinants of Health     Financial Resource Strain: Low Risk     Difficulty of Paying Living Expenses: Not hard at all   Food Insecurity: No Food Insecurity    Worried About [address] in the Last Year: Never true    [address] [place] in the Last Year: Never true   Transportation Needs:     Lack of Transportation (Medical): Not on file    Lack of Transportation (Non-Medical):  Not on file   Physical Activity:     Days of Exercise per Week: Not on file    Minutes of Exercise per Session: Not on file   Stress:     Feeling of Stress : Not on file   Social Connections:     Frequency of Communication with Friends and Family: Not on file    Frequency of Social Gatherings with Friends and Family: Not on file    Attends Pentecostal Services: Not on file    Active Member of Clubs or Organizations: Not on file    Attends Club or Organization Meetings: Not on file    Marital Status: Not on file   Intimate Partner Violence:     Fear of Current or Ex-Partner: Not on file    Emotionally Abused: Not on file    Physically Abused: Not on file    Sexually Abused: Not on file   Housing Stability:     Unable to Pay for Housing in the Last Year: Not on file    Number of [place] in the Last Year: Not on file    Unstable Housing in the Last Year: Not on file     History reviewed. No pertinent family history. Allergies   Allergen Reactions    Hydrocodone-Acetaminophen Other (See Comments)     Dizzy, Nausea.  Hydroxychloroquine Other (See Comments)     Nausea, stomach upset    Indomethacin Other (See Comments)     Dizziness, nausea, rash and vomiting    Amoxicillin Diarrhea and Nausea And Vomiting    Lactose Nausea And Vomiting    Other      \"Cheap metals\"  rash     Current Outpatient Medications on File Prior to Visit   Medication Sig Dispense Refill    pantoprazole (PROTONIX) 40 MG tablet Take 1 tablet by mouth every morning (before breakfast) 30 tablet 11    furosemide (LASIX) 20 MG tablet Take 0.5 tablets by mouth every other day 45 tablet 3    metoclopramide (REGLAN) 10 MG tablet Take 0.5 tablets by mouth 3 times daily (with meals) 120 tablet 3    lidocaine 4 % external patch Place 3 patches onto the skin daily 60 patch 1    etanercept (ENBREL) 50 MG/ML injection Inject into the skin once a week Takes monday      folic acid (FOLVITE) 1 MG tablet TAKE 1 TABLET BY MOUTH ONCE DAILY      predniSONE (DELTASONE) 5 MG tablet Take 5 mg by mouth daily      Calcium Carbonate-Vit D-Min (CALTRATE 600+D PLUS PO) Take by mouth daily      levothyroxine (SYNTHROID) 100 MCG tablet TAKE 1 TABLET BY MOUTH ONCE DAILY      Lifitegrast (XIIDRA) 5 % SOLN Apply 1 drop to eye 2 times daily      zoledronic acid (RECLAST) 5 MG/100ML SOLN Infuse 5 mg intravenously once Yearly      Multiple Vitamins-Minerals (CENTRUM ADULTS PO) Take by mouth      Carboxymethylcellul-Glycerin (REFRESH RELIEVA OP) Apply to eye      iron sucrose (VENOFER) 20 MG/ML injection Infuse 100 mg intravenously Q 3 months. Had 1 month ago       No current facility-administered medications on file prior to visit. Review of Systems   Constitutional: Positive for activity change.  Negative for appetite change, fatigue, fever and unexpected weight change. HENT: Negative. Negative for sore throat and voice change. Eyes: Negative. Respiratory: Negative for cough, shortness of breath and wheezing. Cardiovascular: Positive for leg swelling. Negative for chest pain. Gastrointestinal: Negative. Negative for constipation and nausea. Endocrine: Negative. Genitourinary: Negative. Musculoskeletal: Positive for arthralgias, back pain and joint swelling. Negative for myalgias. Skin: Negative. Negative for color change and rash. Allergic/Immunologic: Negative. Neurological: Positive for weakness. Negative for dizziness, tremors and syncope. Hematological: Negative. Psychiatric/Behavioral: Negative. Negative for agitation, confusion, decreased concentration and suicidal ideas. The patient is not nervous/anxious. Objective:   BP (!) 143/67   Pulse 73   Resp 20   SpO2 98%    Wt Readings from Last 3 Encounters:   10/15/21 73 lb 9.6 oz (33.4 kg)   10/01/21 73 lb 6.6 oz (33.3 kg)   09/22/21 85 lb (38.6 kg)     Physical Exam  Vitals reviewed. Constitutional:       Appearance: She is well-developed. She is ill-appearing. Comments: Thin and frail appearing   HENT:      Head: Normocephalic. Eyes:      Pupils: Pupils are equal, round, and reactive to light. Cardiovascular:      Rate and Rhythm: Normal rate and regular rhythm. Pulmonary:      Effort: Pulmonary effort is normal.      Breath sounds: Normal breath sounds. Abdominal:      General: Bowel sounds are normal. There is no distension. Palpations: Abdomen is soft. Tenderness: There is no abdominal tenderness. There is no guarding. Musculoskeletal:         General: Normal range of motion. Cervical back: Normal range of motion. Right lower leg: Edema present. Left lower leg: Edema present. Comments: Trace BLE edema  Contractures of bilateral hands and right foot   Skin:     General: Skin is warm and dry.    Neurological: one day or 5 lbs. in one week, or for increased edema, sob, cough, orthopnea, or chest pain  -Elevate BLE while in dependent position  -Avoid added salt; reviewed basic skin care.  -Compression socks on during day, off at night  -Maintain follow up with cardiology-    11. Gastroesophageal reflux disease, unspecified whether esophagitis present  Controlled on current regime    12. Encounter for palliative care  -  Educated on long term disease prognosis and progression.     - Call for any questions, concerns or change in condition. Much support, guidance and active listening provided. Medications Discontinued During This Encounter   Medication Reason    oxyCODONE-acetaminophen (PERCOCET) 7.5-325 MG per tablet LIST CLEANUP    pregabalin (LYRICA) 20 MG/ML SOLN solution REORDER         Due to acuity, symptomatology and high-risk medication management, I advised patient to Return in about 1 month (around 4/1/2022), or if symptoms worsen or fail to improve.        Total Time 60 mins   > 50% Time Spent Counseling/Care coordination yes       ROBIN Merino - CNP    Collaborating physician: Dr. Wicho Alston

## 2022-03-16 ENCOUNTER — OFFICE VISIT (OUTPATIENT)
Dept: CARDIOLOGY CLINIC | Age: 67
End: 2022-03-16
Payer: MEDICARE

## 2022-03-16 VITALS
HEART RATE: 75 BPM | RESPIRATION RATE: 16 BRPM | DIASTOLIC BLOOD PRESSURE: 62 MMHG | OXYGEN SATURATION: 95 % | SYSTOLIC BLOOD PRESSURE: 104 MMHG

## 2022-03-16 DIAGNOSIS — I50.9 ACUTE CONGESTIVE HEART FAILURE, UNSPECIFIED HEART FAILURE TYPE (HCC): Primary | ICD-10-CM

## 2022-03-16 DIAGNOSIS — I10 ESSENTIAL HYPERTENSION: ICD-10-CM

## 2022-03-16 PROCEDURE — 1090F PRES/ABSN URINE INCON ASSESS: CPT | Performed by: INTERNAL MEDICINE

## 2022-03-16 PROCEDURE — G8419 CALC BMI OUT NRM PARAM NOF/U: HCPCS | Performed by: INTERNAL MEDICINE

## 2022-03-16 PROCEDURE — 1036F TOBACCO NON-USER: CPT | Performed by: INTERNAL MEDICINE

## 2022-03-16 PROCEDURE — 4040F PNEUMOC VAC/ADMIN/RCVD: CPT | Performed by: INTERNAL MEDICINE

## 2022-03-16 PROCEDURE — 3017F COLORECTAL CA SCREEN DOC REV: CPT | Performed by: INTERNAL MEDICINE

## 2022-03-16 PROCEDURE — 1123F ACP DISCUSS/DSCN MKR DOCD: CPT | Performed by: INTERNAL MEDICINE

## 2022-03-16 PROCEDURE — G8484 FLU IMMUNIZE NO ADMIN: HCPCS | Performed by: INTERNAL MEDICINE

## 2022-03-16 PROCEDURE — G8400 PT W/DXA NO RESULTS DOC: HCPCS | Performed by: INTERNAL MEDICINE

## 2022-03-16 PROCEDURE — 99214 OFFICE O/P EST MOD 30 MIN: CPT | Performed by: INTERNAL MEDICINE

## 2022-03-16 PROCEDURE — G8427 DOCREV CUR MEDS BY ELIG CLIN: HCPCS | Performed by: INTERNAL MEDICINE

## 2022-03-16 RX ORDER — MIDODRINE HYDROCHLORIDE 2.5 MG/1
2.5 TABLET ORAL 3 TIMES DAILY
Qty: 60 TABLET | Refills: 3 | Status: SHIPPED | OUTPATIENT
Start: 2022-03-16 | End: 2022-03-18 | Stop reason: SDUPTHER

## 2022-03-16 ASSESSMENT — ENCOUNTER SYMPTOMS
STRIDOR: 0
BLOOD IN STOOL: 0
WHEEZING: 0
EYES NEGATIVE: 1
GASTROINTESTINAL NEGATIVE: 1
COUGH: 0
NAUSEA: 0
SHORTNESS OF BREATH: 0
CHEST TIGHTNESS: 0
RESPIRATORY NEGATIVE: 1

## 2022-03-16 NOTE — PROGRESS NOTES
Substance and Sexual Activity    Alcohol use: Never    Drug use: Never    Sexual activity: Not Currently   Other Topics Concern    None   Social History Narrative    None     Social Determinants of Health     Financial Resource Strain: Low Risk     Difficulty of Paying Living Expenses: Not hard at all   Food Insecurity: No Food Insecurity    Worried About Running Out of Food in the Last Year: Never true    [place] of Food in the Last Year: Never true   Transportation Needs:     Lack of Transportation (Medical): Not on file    Lack of Transportation (Non-Medical): Not on file   Physical Activity:     Days of Exercise per Week: Not on file    Minutes of Exercise per Session: Not on file   Stress:     Feeling of Stress : Not on file   Social Connections:     Frequency of Communication with Friends and Family: Not on file    Frequency of Social Gatherings with Friends and Family: Not on file    Attends Worship Services: Not on file    Active Member of [address] [place] [address] or Organizations: Not on file    Attends Club or Organization Meetings: Not on file    Marital Status: Not on file   Intimate Partner Violence:     Fear of Current or Ex-Partner: Not on file    Emotionally Abused: Not on file    Physically Abused: Not on file    Sexually Abused: Not on file   Housing Stability:     Unable to Pay for Housing in the Last Year: Not on file    Number of [place] in the Last Year: Not on file    Unstable Housing in the Last Year: Not on file       Allergies   Allergen Reactions    Hydrocodone-Acetaminophen Other (See Comments)     Dizzy, Nausea.     Hydroxychloroquine Other (See Comments)     Nausea, stomach upset    Indomethacin Other (See Comments)     Dizziness, nausea, rash and vomiting    Amoxicillin Diarrhea and Nausea And Vomiting    Lactose Nausea And Vomiting    Other      \"Cheap metals\"  rash       Current Outpatient Medications   Medication Sig Dispense Refill    oxyCODONE-acetaminophen (PERCOCET)  MG per tablet Take 0.5 tablets by mouth every 3 hours as needed for Pain for up to 30 days. Intended supply: 30 days 180 tablet 0    pregabalin (LYRICA) 20 MG/ML SOLN solution Take 2 mLs by mouth 4 times daily for 90 days. 240 mL 2    pantoprazole (PROTONIX) 40 MG tablet Take 1 tablet by mouth every morning (before breakfast) 30 tablet 11    furosemide (LASIX) 20 MG tablet Take 0.5 tablets by mouth every other day 45 tablet 3    metoclopramide (REGLAN) 10 MG tablet Take 0.5 tablets by mouth 3 times daily (with meals) 120 tablet 3    etanercept (ENBREL) 50 MG/ML injection Inject into the skin once a week Takes monday      folic acid (FOLVITE) 1 MG tablet TAKE 1 TABLET BY MOUTH ONCE DAILY      predniSONE (DELTASONE) 5 MG tablet Take 5 mg by mouth daily      Calcium Carbonate-Vit D-Min (CALTRATE 600+D PLUS PO) Take by mouth daily      levothyroxine (SYNTHROID) 100 MCG tablet TAKE 1 TABLET BY MOUTH ONCE DAILY      Lifitegrast (XIIDRA) 5 % SOLN Apply 1 drop to eye 2 times daily      Multiple Vitamins-Minerals (CENTRUM ADULTS PO) Take by mouth      Carboxymethylcellul-Glycerin (REFRESH RELIEVA OP) Apply to eye      zoledronic acid (RECLAST) 5 MG/100ML SOLN Infuse 5 mg intravenously once Yearly (Patient not taking: Reported on 3/16/2022)       No current facility-administered medications for this visit. Review of Systems:   Review of Systems   Constitutional: Positive for fatigue. Negative for diaphoresis. HENT: Negative. Eyes: Negative. Respiratory: Negative. Negative for cough, chest tightness, shortness of breath, wheezing and stridor. Cardiovascular: Negative. Negative for chest pain, palpitations and leg swelling. Gastrointestinal: Negative. Negative for blood in stool and nausea. Genitourinary: Negative. Musculoskeletal: Positive for arthralgias and gait problem. Skin: Negative. Neurological: Positive for weakness.  Negative for dizziness, syncope and light-headedness. Hematological: Negative. Psychiatric/Behavioral: Negative. Physical Examination:    /62 (Site: Right Upper Arm, Position: Sitting, Cuff Size: Medium Adult)   Pulse 75   Resp 16   SpO2 95%    Physical Exam   Constitutional: She appears cachectic. No distress. HENT:   Normal cephalic and Atraumatic   Eyes: Pupils are equal, round, and reactive to light. Neck: Thyroid normal. No JVD present. No neck adenopathy. No thyromegaly present. Cardiovascular: Normal rate, regular rhythm, normal heart sounds, intact distal pulses and normal pulses. Pulmonary/Chest: Effort normal and breath sounds normal. She has no wheezes. She has no rales. She exhibits no tenderness. Abdominal: Soft. Bowel sounds are normal. There is no abdominal tenderness. Musculoskeletal:         General: No tenderness or edema. Normal range of motion. Cervical back: Normal range of motion and neck supple. Neurological: She is alert and oriented to person, place, and time. Skin: Skin is warm. No cyanosis. Nails show no clubbing.        LABS:  CBC:   Lab Results   Component Value Date    WBC 5.0 10/21/2021    RBC 3.97 10/21/2021    HGB 12.3 11/17/2021    HCT 39.5 11/17/2021    MCV 86.3 10/21/2021    MCH 27.5 10/21/2021    MCHC 31.9 10/21/2021    RDW 25.5 10/21/2021     10/21/2021     Lipids:No results found for: CHOL  No results found for: TRIG  No results found for: HDL  No results found for: LDLCHOLESTEROL, LDLCALC  No results found for: LABVLDL, VLDL  No results found for: CHOLHDLRATIO  CMP:    Lab Results   Component Value Date     12/03/2021    K 4.0 12/03/2021    K 2.9 10/02/2021    CL 98 12/03/2021    CO2 26 12/03/2021    BUN 9 12/03/2021    CREATININE 0.39 12/03/2021    GFRAA >60.0 12/03/2021    LABGLOM >60.0 12/03/2021    GLUCOSE 119 12/03/2021    PROT 6.0 10/01/2021    LABALBU 3.1 10/01/2021    CALCIUM 8.6 12/03/2021    BILITOT 0.3 10/01/2021    ALKPHOS 57 10/01/2021    AST Low Salt Diet, Increase Nutritional Intake and Walk Daily    Return in about 4 months (around 7/16/2022).     Electronically signed by Jeniffer Sims MD on 3/16/2022 at 3:42 PM

## 2022-03-18 ENCOUNTER — TELEPHONE (OUTPATIENT)
Dept: PALLATIVE CARE | Age: 67
End: 2022-03-18

## 2022-03-18 DIAGNOSIS — M81.0 AGE-RELATED OSTEOPOROSIS WITHOUT CURRENT PATHOLOGICAL FRACTURE: ICD-10-CM

## 2022-03-18 DIAGNOSIS — M05.79 RHEU ARTHRITIS W RHEU FACTOR MULT SITE W/O ORG/SYS INVOLV (HCC): ICD-10-CM

## 2022-03-18 DIAGNOSIS — Z51.5 ENCOUNTER FOR PALLIATIVE CARE: ICD-10-CM

## 2022-03-18 DIAGNOSIS — G89.29 OTHER CHRONIC PAIN: ICD-10-CM

## 2022-03-18 RX ORDER — OXYCODONE AND ACETAMINOPHEN 7.5; 325 MG/1; MG/1
1 TABLET ORAL
Qty: 180 TABLET | Refills: 0 | Status: SHIPPED | OUTPATIENT
Start: 2022-03-18 | End: 2022-04-12 | Stop reason: SDUPTHER

## 2022-03-18 NOTE — TELEPHONE ENCOUNTER
Pt's  called, states that Jordyn Tilley has been in more pain lately than normal. States they feel this is due to weather changes but would like to know if something can be changed to help her better. Please Call pt's  Ale [place].

## 2022-03-18 NOTE — TELEPHONE ENCOUNTER
Spoke with  in detail on patients PM routine. States pain is again exacerbated since I saw patient at lst visit. Recently gave an extra 0.25 tab of percocet on top of 0.5 tab dose with relief. This occurred shortly prior to phone call x 1 dose. States most relief patient has had since prior visit and she seems comfortable. Advised As has tolerated current dose since 3/1/22 agree with increase. Will increase to 7.5/325mg Po Q3hrs PRN. Has been having intermittent dizziness related to hypotension and was started on midodrine approx one day ago. Advised to follow up with prescribing physician regarding.      dizziness

## 2022-04-12 ENCOUNTER — OFFICE VISIT (OUTPATIENT)
Dept: PALLATIVE CARE | Age: 67
End: 2022-04-12
Payer: MEDICARE

## 2022-04-12 DIAGNOSIS — R13.10 DYSPHAGIA, UNSPECIFIED TYPE: ICD-10-CM

## 2022-04-12 DIAGNOSIS — R60.9 EDEMA, UNSPECIFIED TYPE: ICD-10-CM

## 2022-04-12 DIAGNOSIS — I50.9 CHRONIC CONGESTIVE HEART FAILURE, UNSPECIFIED HEART FAILURE TYPE (HCC): ICD-10-CM

## 2022-04-12 DIAGNOSIS — G89.29 OTHER CHRONIC PAIN: ICD-10-CM

## 2022-04-12 DIAGNOSIS — M05.79 RHEU ARTHRITIS W RHEU FACTOR MULT SITE W/O ORG/SYS INVOLV (HCC): ICD-10-CM

## 2022-04-12 DIAGNOSIS — M24.50 CONTRACTURE OF JOINT OF MULTIPLE SITES: Primary | ICD-10-CM

## 2022-04-12 DIAGNOSIS — M79.7 FIBROMYALGIA, SECONDARY: ICD-10-CM

## 2022-04-12 DIAGNOSIS — Z51.5 ENCOUNTER FOR PALLIATIVE CARE: ICD-10-CM

## 2022-04-12 DIAGNOSIS — Z79.52 LONG TERM (CURRENT) USE OF SYSTEMIC STEROIDS: ICD-10-CM

## 2022-04-12 DIAGNOSIS — R26.9 ABNORMALITY OF GAIT: ICD-10-CM

## 2022-04-12 DIAGNOSIS — M81.0 AGE-RELATED OSTEOPOROSIS WITHOUT CURRENT PATHOLOGICAL FRACTURE: ICD-10-CM

## 2022-04-12 PROCEDURE — 99349 HOME/RES VST EST MOD MDM 40: CPT | Performed by: FAMILY MEDICINE

## 2022-04-12 PROCEDURE — G8419 CALC BMI OUT NRM PARAM NOF/U: HCPCS | Performed by: FAMILY MEDICINE

## 2022-04-12 PROCEDURE — 1090F PRES/ABSN URINE INCON ASSESS: CPT | Performed by: FAMILY MEDICINE

## 2022-04-12 PROCEDURE — 1036F TOBACCO NON-USER: CPT | Performed by: FAMILY MEDICINE

## 2022-04-12 PROCEDURE — 4040F PNEUMOC VAC/ADMIN/RCVD: CPT | Performed by: FAMILY MEDICINE

## 2022-04-12 PROCEDURE — 1123F ACP DISCUSS/DSCN MKR DOCD: CPT | Performed by: FAMILY MEDICINE

## 2022-04-12 PROCEDURE — 3017F COLORECTAL CA SCREEN DOC REV: CPT | Performed by: FAMILY MEDICINE

## 2022-04-12 RX ORDER — OXYCODONE AND ACETAMINOPHEN 7.5; 325 MG/1; MG/1
1 TABLET ORAL
Qty: 240 TABLET | Refills: 0 | Status: SHIPPED | OUTPATIENT
Start: 2022-04-12 | End: 2022-05-11 | Stop reason: SDUPTHER

## 2022-04-12 RX ORDER — FUROSEMIDE 20 MG/1
10 TABLET ORAL DAILY PRN
Qty: 90 TABLET | Refills: 3
Start: 2022-04-12

## 2022-04-12 ASSESSMENT — ENCOUNTER SYMPTOMS
SORE THROAT: 0
SHORTNESS OF BREATH: 0
EYES NEGATIVE: 1
GASTROINTESTINAL NEGATIVE: 1
VOICE CHANGE: 0
NAUSEA: 0
COLOR CHANGE: 0
COUGH: 0
WHEEZING: 0
CONSTIPATION: 0
ALLERGIC/IMMUNOLOGIC NEGATIVE: 1
BACK PAIN: 1

## 2022-04-12 NOTE — LETTER
Pain Controlled on current regime. [address] POC with patient as likely sharp increases in pain over next year with end stages of disease to increase next to percocet 10/325 very 3 hours as needed for Pain then morphine/oxycodone concentrate as all meds have to be chrushed in pudding or food and does not tolerate patches well.  Will relay to new provider

## 2022-04-12 NOTE — PROGRESS NOTES
Subjective:      Patient Id: Seen Norbert Talamantes at  [facility] for symptom management. She was accompanied to the appointment by: her   Chief Complaint   Patient presents with    Pain    Other    Follow-up      HPI       Mason Sanders is a 78 y.o. female seen for symptom management. Norbert Talamantes has complex medical history that includes fibromyagia, OA, and End stage RA with multiple contractures. Home visit is necessary in lieu of office due to significant frailty and high symptom burden from comorbid illnesses. States pain is controlled on current regime following exacerbation. Rates the pain at a Pain Score:   6 on a scale of 0 to 10. States it occurs in joints throughout body. Hx Ra with multiple Contractures of joints noted throughout body. Describes pain as a constant ache that intermittently worsens. Currently taking Percocet 7.5/325 1 tabs every 3hrs PRN and states it relieves pain except on days with weather changes. Denies Sedation, constipation, or other adverse effects on current regime. Fibromyalgia and neuropathic pain controlled on oral lyrica concentrate. Edema at baseline. Taking diuretics PRN at this time.      Weight subjectively stable    Past Medical History:   Diagnosis Date    [facility]congest[facility] failure) ([facility])     Fibromyalgia     Fracture of pelvis ([address]     left    History of left hip replacement     History of total left knee replacement     History of total right knee replacement     Light sensitivity     bilateral eye    Osteoporosis     Rheumatoid arthritis ([address]     Sjogrens syndrome ([address]      Past Surgical History:   Procedure Laterality Date    CATARACT REMOVAL WITH IMPLANT  01/2013    bilateral    CHOLECYSTECTOMY      OTHER SURGICAL HISTORY      bilateral eye implant    OVARY REMOVAL  06/2000    right    REVISION TOTAL HIP ARTHROPLASTY  10/2012    left    TOTAL HIP ARTHROPLASTY  04/1990    left    TOTAL KNEE ARTHROPLASTY  08/1997    right and left Social History     Socioeconomic History    Marital status:      Spouse name: Not on file    Number of children: Not on file    Years of education: Not on file    Highest education level: Not on file   Occupational History    Not on file   Tobacco Use    Smoking status: Never Smoker    Smokeless tobacco: Never Used   Vaping Use    Vaping Use: Never used   Substance and Sexual Activity    Alcohol use: Never    Drug use: Never    Sexual activity: Not Currently   Other Topics Concern    Not on file   Social History Narrative    Not on file     Social Determinants of Health     Financial Resource Strain: Low Risk     Difficulty of Paying Living Expenses: Not hard at all   Food Insecurity: No Food Insecurity    Worried About Running Out of Food in the Last Year: Never true    [address] in the Last Year: Never true   Transportation Needs:     Lack of Transportation (Medical): Not on file    Lack of Transportation (Non-Medical): Not on file   Physical Activity:     Days of Exercise per Week: Not on file    Minutes of Exercise per Session: Not on file   Stress:     Feeling of Stress : Not on file   Social Connections:     Frequency of Communication with Friends and Family: Not on file    Frequency of Social Gatherings with Friends and Family: Not on file    Attends Rastafari Services: Not on file    Active Member of [address] [place] [address] [place] or Organizations: Not on file    Attends Club or Organization Meetings: Not on file    Marital Status: Not on file   Intimate Partner Violence:     Fear of Current or Ex-Partner: Not on file    Emotionally Abused: Not on file    Physically Abused: Not on file    Sexually Abused: Not on file   Housing Stability:     Unable to Pay for Housing in the Last Year: Not on file    Number of [place] in the Last Year: Not on file    Unstable Housing in the Last Year: Not on file     No family history on file.   Allergies   Allergen Reactions    Hydrocodone-Acetaminophen Other (See Comments)     Dizzy, Nausea.  Hydroxychloroquine Other (See Comments)     Nausea, stomach upset    Indomethacin Other (See Comments)     Dizziness, nausea, rash and vomiting    Amoxicillin Diarrhea and Nausea And Vomiting    Lactose Nausea And Vomiting    Other      \"Cheap metals\"  rash     Current Outpatient Medications on File Prior to Visit   Medication Sig Dispense Refill    midodrine (PROAMATINE) 2.5 MG tablet Take 1 tablet by mouth 3 times daily 90 tablet 5    pregabalin (LYRICA) 20 MG/ML SOLN solution Take 2 mLs by mouth 4 times daily for 90 days. 240 mL 2    pantoprazole (PROTONIX) 40 MG tablet Take 1 tablet by mouth every morning (before breakfast) 30 tablet 11    metoclopramide (REGLAN) 10 MG tablet Take 0.5 tablets by mouth 3 times daily (with meals) 120 tablet 3    etanercept (ENBREL) 50 MG/ML injection Inject into the skin once a week Takes monday      folic acid (FOLVITE) 1 MG tablet TAKE 1 TABLET BY MOUTH ONCE DAILY      predniSONE (DELTASONE) 5 MG tablet Take 5 mg by mouth daily      Calcium Carbonate-Vit D-Min (CALTRATE 600+D PLUS PO) Take by mouth daily      levothyroxine (SYNTHROID) 100 MCG tablet TAKE 1 TABLET BY MOUTH ONCE DAILY      Lifitegrast (XIIDRA) 5 % SOLN Apply 1 drop to eye 2 times daily      zoledronic acid (RECLAST) 5 MG/100ML SOLN Infuse 5 mg intravenously once Yearly (Patient not taking: Reported on 3/16/2022)      Multiple Vitamins-Minerals (CENTRUM ADULTS PO) Take by mouth      Carboxymethylcellul-Glycerin (REFRESH RELIEVA OP) Apply to eye       No current facility-administered medications on file prior to visit. Review of Systems   Constitutional: Positive for activity change. Negative for appetite change, fatigue, fever and unexpected weight change. HENT: Negative. Negative for sore throat and voice change. Eyes: Negative. Respiratory: Negative for cough, shortness of breath and wheezing.     Cardiovascular: Positive for leg swelling. Negative for chest pain. Gastrointestinal: Negative. Negative for constipation and nausea. Endocrine: Negative. Genitourinary: Negative. Musculoskeletal: Positive for arthralgias, back pain and joint swelling. Negative for myalgias. Skin: Negative. Negative for color change and rash. Allergic/Immunologic: Negative. Neurological: Positive for weakness. Negative for dizziness, tremors and syncope. Hematological: Negative. Psychiatric/Behavioral: Negative. Negative for agitation, confusion, decreased concentration and suicidal ideas. The patient is not nervous/anxious. Objective:   /80   Pulse 77   Resp 20   SpO2 98%    Wt Readings from Last 3 Encounters:   10/15/21 73 lb 9.6 oz (33.4 kg)   10/01/21 73 lb 6.6 oz (33.3 kg)   09/22/21 85 lb (38.6 kg)     Physical Exam  Vitals reviewed. Constitutional:       Appearance: She is well-developed. She is ill-appearing. Comments: Thin and frail appearing   HENT:      Head: Normocephalic. Eyes:      Pupils: Pupils are equal, round, and reactive to light. Cardiovascular:      Rate and Rhythm: Normal rate and regular rhythm. Pulmonary:      Effort: Pulmonary effort is normal.      Breath sounds: Normal breath sounds. Abdominal:      General: Bowel sounds are normal. There is no distension. Palpations: Abdomen is soft. Tenderness: There is no abdominal tenderness. There is no guarding. Musculoskeletal:         General: Normal range of motion. Cervical back: Normal range of motion. Right lower leg: Edema present. Left lower leg: Edema present. Comments: Trace BLE edema  Contractures of bilateral hands and right foot   Skin:     General: Skin is warm and dry. Neurological:      Mental Status: She is alert and oriented to person, place, and time. Motor: Weakness present.       Gait: Gait abnormal.   Psychiatric:         Behavior: Behavior normal. Assessment and Plan:      1. Age-related osteoporosis without current pathological fracture  2. Rheu arthritis w rheu factor mult site w/o org/sys involv ([facility]  3. Contracture of joint of multiple sites  4. Other chronic pain  Controlled on current regime. [address] POC with patient as likely sharp increases in pain over next year with end stages of disease to increase next to percocet 10/325 very 3 hours as needed for Pain then morphine/oxycodone concentrate as all meds have to be chrushed in pudding or food and does not tolerate patches well. Will relay to new provider    - oxyCODONE-acetaminophen (PERCOCET) 7.5-325 MG per tablet; Take 1 tablet by mouth every 3 hours as needed for Pain for up to 30 days. Dispense: 240 tablet; Refill: 0    Pt is tolerating current pain meds without adverse effects or over sedation. Lowest effective dose used. Pt advised to call and notify palliative care for any adverse effects or sedation  Pt is able to maintain adequate functional level and participate in ADLs  OARRS reviewed. There is no indication of aberrant behavior  Pt advised to call for increasing or uncontrolled pain. Risk vs benefit assessed. Pt educated on risk of addiction. Pt advised to take only as prescribed and not to change frequency of pain meds without consulting palliative care first.    5. Fibromyalgia, secondary  Controlled on current regime    6. Dysphagia, unspecified type  Continue diet as tollerated. Small, frequent meals. Maximize calories and protein  Daily nutritional shakes    7. Abnormality of gait  8. Long term (current) use of systemic steroids  - stable disease process    9. Edema, unspecified type  10. Chronic congestive heart failure, unspecified heart failure type ([facility]  Baseline     -Continue diuretic as prescribed  -Daily weights, call if you gain 3 lbs in one day or 5 lbs.  in one week, or for increased edema, sob, cough, orthopnea, or chest pain  -Elevate BLE while in dependent position  -Avoid added salt; reviewed basic skin care.  -Compression socks on during day, off at night  -Maintain follow up with cardiology-    11. Encounter for palliative care  - Call for any questions, concerns or change in condition. Much support, guidance and active listening provided. Medications Discontinued During This Encounter   Medication Reason    oxyCODONE-acetaminophen (PERCOCET) 7.5-325 MG per tablet REORDER    furosemide (LASIX) 20 MG tablet REORDER         Due to acuity, symptomatology and high-risk medication management, I advised patient to Return in about 1 month (around 5/12/2022), or if symptoms worsen or fail to improve.        Total Time 40 mins   > 50% Time Spent Counseling/Care coordination yes       ROBIN Angeles - CNP    Collaborating physician: Dr. Jac Davies

## 2022-04-13 VITALS
SYSTOLIC BLOOD PRESSURE: 116 MMHG | RESPIRATION RATE: 20 BRPM | OXYGEN SATURATION: 98 % | HEART RATE: 77 BPM | DIASTOLIC BLOOD PRESSURE: 80 MMHG

## 2022-04-26 ENCOUNTER — OFFICE VISIT (OUTPATIENT)
Dept: CARDIOLOGY CLINIC | Age: 67
End: 2022-04-26
Payer: MEDICARE

## 2022-04-26 VITALS
HEART RATE: 70 BPM | DIASTOLIC BLOOD PRESSURE: 64 MMHG | SYSTOLIC BLOOD PRESSURE: 114 MMHG | RESPIRATION RATE: 16 BRPM | OXYGEN SATURATION: 97 %

## 2022-04-26 DIAGNOSIS — I10 HYPERTENSION, UNSPECIFIED TYPE: ICD-10-CM

## 2022-04-26 DIAGNOSIS — I10 ESSENTIAL HYPERTENSION: Primary | ICD-10-CM

## 2022-04-26 PROCEDURE — 1123F ACP DISCUSS/DSCN MKR DOCD: CPT | Performed by: INTERNAL MEDICINE

## 2022-04-26 PROCEDURE — G8400 PT W/DXA NO RESULTS DOC: HCPCS | Performed by: INTERNAL MEDICINE

## 2022-04-26 PROCEDURE — G8427 DOCREV CUR MEDS BY ELIG CLIN: HCPCS | Performed by: INTERNAL MEDICINE

## 2022-04-26 PROCEDURE — 4040F PNEUMOC VAC/ADMIN/RCVD: CPT | Performed by: INTERNAL MEDICINE

## 2022-04-26 PROCEDURE — 1036F TOBACCO NON-USER: CPT | Performed by: INTERNAL MEDICINE

## 2022-04-26 PROCEDURE — 3017F COLORECTAL CA SCREEN DOC REV: CPT | Performed by: INTERNAL MEDICINE

## 2022-04-26 PROCEDURE — G8419 CALC BMI OUT NRM PARAM NOF/U: HCPCS | Performed by: INTERNAL MEDICINE

## 2022-04-26 PROCEDURE — 1090F PRES/ABSN URINE INCON ASSESS: CPT | Performed by: INTERNAL MEDICINE

## 2022-04-26 PROCEDURE — 99214 OFFICE O/P EST MOD 30 MIN: CPT | Performed by: INTERNAL MEDICINE

## 2022-04-26 ASSESSMENT — ENCOUNTER SYMPTOMS
CHEST TIGHTNESS: 0
STRIDOR: 0
EYES NEGATIVE: 1
GASTROINTESTINAL NEGATIVE: 1
WHEEZING: 0
COUGH: 0
NAUSEA: 0
BLOOD IN STOOL: 0
RESPIRATORY NEGATIVE: 1
SHORTNESS OF BREATH: 0

## 2022-04-26 NOTE — PROGRESS NOTES
OFFICE VISIT        Patient: Carola Nieto  YOB: 1955  MRN: [id number]    Chief Complaint: RA LE Edema. Chief Complaint   Patient presents with    Follow-up     6 week-medication check    Congestive Heart Failure    Hypertension       CV Data:  11/9/21 LE Vein No DVT   12/21 Echo EF 60 2+ TR RVSP 39     Subjective/HPI very frail lady with progressive LE edeam weakness and cachexia. She has severe RA. She is minimally active. She has no cp no sob not dizzy. Recent BNP elevated. She is mild Hyponatremic as well. Started low dose Lasix yesterdy. Edema has gone down some per pt and . 3/16/22 wants to stop Lasix. She is currnelty taking QOD or q3rd day. No cp no sob. Ambulated eating better. Very dizzy BP low. 4/26/22 feels much better on Midorine low dose. Not dizzy no spb no cp no falls no bleed. No prior CV history    nonsmoker     EKG: SR 81    Past Medical History:   Diagnosis Date    CHF (congestive heart failure) (HCC)     Fibromyalgia     Fracture of pelvis (HCC)     left    History of left hip replacement     History of total left knee replacement     History of total right knee replacement     Light sensitivity     bilateral eye    Osteoporosis     Rheumatoid arthritis ([address]     Sjogrens syndrome ([address]        Past Surgical History:   Procedure Laterality Date    CATARACT REMOVAL WITH IMPLANT  01/2013    bilateral    CHOLECYSTECTOMY      OTHER SURGICAL HISTORY      bilateral eye implant    OVARY REMOVAL  06/2000    right    REVISION TOTAL HIP ARTHROPLASTY  10/2012    left    TOTAL HIP ARTHROPLASTY  04/1990    left    TOTAL KNEE ARTHROPLASTY  08/1997    right and left       History reviewed. No pertinent family history.     Social History     Socioeconomic History    Marital status:      Spouse name: None    Number of children: None    Years of education: None    Highest education level: None   Occupational History    None   Tobacco Use    Smoking status: Never Smoker    Smokeless tobacco: Never Used   Vaping Use    Vaping Use: Never used   Substance and Sexual Activity    Alcohol use: Never    Drug use: Never    Sexual activity: Not Currently   Other Topics Concern    None   Social History Narrative    None     Social Determinants of Health     Financial Resource Strain: Low Risk     Difficulty of Paying Living Expenses: Not hard at all   Food Insecurity: No Food Insecurity    Worried About Running Out of Food in the Last Year: Never true    [address] in the Last Year: Never true   Transportation Needs:     Lack of Transportation (Medical): Not on file    Lack of Transportation (Non-Medical): Not on file   Physical Activity:     Days of Exercise per Week: Not on file    Minutes of Exercise per Session: Not on file   Stress:     Feeling of Stress : Not on file   Social Connections:     Frequency of Communication with Friends and Family: Not on file    Frequency of Social Gatherings with Friends and Family: Not on file    Attends Mandaeism Services: Not on file    Active Member of [address] [place] [address] or Organizations: Not on file    Attends Club or Organization Meetings: Not on file    Marital Status: Not on file   Intimate Partner Violence:     Fear of Current or Ex-Partner: Not on file    Emotionally Abused: Not on file    Physically Abused: Not on file    Sexually Abused: Not on file   Housing Stability:     Unable to Pay for Housing in the Last Year: Not on file    Number of [place] in the Last Year: Not on file    Unstable Housing in the Last Year: Not on file       Allergies   Allergen Reactions    Hydrocodone-Acetaminophen Other (See Comments)     Dizzy, Nausea.     Hydroxychloroquine Other (See Comments)     Nausea, stomach upset    Indomethacin Other (See Comments)     Dizziness, nausea, rash and vomiting    Amoxicillin Diarrhea and Nausea And Vomiting    Lactose Nausea And Vomiting    Other      \"Cheap metals\"  rash       Current Outpatient Medications   Medication Sig Dispense Refill    oxyCODONE-acetaminophen (PERCOCET) 7.5-325 MG per tablet Take 1 tablet by mouth every 3 hours as needed for Pain for up to 30 days. 240 tablet 0    midodrine (PROAMATINE) 2.5 MG tablet Take 1 tablet by mouth 3 times daily 90 tablet 5    pregabalin (LYRICA) 20 MG/ML SOLN solution Take 2 mLs by mouth 4 times daily for 90 days. 240 mL 2    pantoprazole (PROTONIX) 40 MG tablet Take 1 tablet by mouth every morning (before breakfast) 30 tablet 11    metoclopramide (REGLAN) 10 MG tablet Take 0.5 tablets by mouth 3 times daily (with meals) 120 tablet 3    etanercept (ENBREL) 50 MG/ML injection Inject into the skin once a week Takes monday      folic acid (FOLVITE) 1 MG tablet TAKE 1 TABLET BY MOUTH ONCE DAILY      predniSONE (DELTASONE) 5 MG tablet Take 5 mg by mouth daily      Calcium Carbonate-Vit D-Min (CALTRATE 600+D PLUS PO) Take by mouth daily      levothyroxine (SYNTHROID) 100 MCG tablet TAKE 1 TABLET BY MOUTH ONCE DAILY      Lifitegrast (XIIDRA) 5 % SOLN Apply 1 drop to eye 2 times daily      zoledronic acid (RECLAST) 5 MG/100ML SOLN Infuse 5 mg intravenously once Yearly      Multiple Vitamins-Minerals (CENTRUM ADULTS PO) Take by mouth      Carboxymethylcellul-Glycerin (REFRESH RELIEVA OP) Apply to eye      furosemide (LASIX) 20 MG tablet Take 0.5 tablets by mouth daily as needed (edema) (Patient not taking: Reported on 4/26/2022) 90 tablet 3     No current facility-administered medications for this visit. Review of Systems:   Review of Systems   Constitutional: Positive for fatigue. Negative for diaphoresis. HENT: Negative. Eyes: Negative. Respiratory: Negative. Negative for cough, chest tightness, shortness of breath, wheezing and stridor. Cardiovascular: Negative. Negative for chest pain, palpitations and leg swelling. Gastrointestinal: Negative. Negative for blood in stool and nausea. Genitourinary: Negative. Musculoskeletal: Positive for arthralgias and gait problem. Skin: Negative. Neurological: Positive for weakness. Negative for dizziness, syncope and light-headedness. Hematological: Negative. Psychiatric/Behavioral: Negative. Physical Examination:    /64 (Site: Right Upper Arm, Position: Sitting, Cuff Size: Medium Adult)   Pulse 70   Resp 16   SpO2 97%    Physical Exam   Constitutional: She appears cachectic. No distress. HENT:   Normal cephalic and Atraumatic   Eyes: Pupils are equal, round, and reactive to light. Neck: Thyroid normal. No JVD present. No neck adenopathy. No thyromegaly present. Cardiovascular: Normal rate, regular rhythm, normal heart sounds, intact distal pulses and normal pulses. Pulmonary/Chest: Effort normal and breath sounds normal. She has no wheezes. She has no rales. She exhibits no tenderness. Abdominal: Soft. Bowel sounds are normal. There is no abdominal tenderness. Musculoskeletal:         General: No tenderness or edema. Normal range of motion. Cervical back: Normal range of motion and neck supple. Neurological: She is alert and oriented to person, place, and time. Skin: Skin is warm. No cyanosis. Nails show no clubbing.        LABS:  CBC:   Lab Results   Component Value Date    WBC 5.0 10/21/2021    RBC 3.97 10/21/2021    HGB 12.3 11/17/2021    HCT 39.5 11/17/2021    MCV 86.3 10/21/2021    MCH 27.5 10/21/2021    MCHC 31.9 10/21/2021    RDW 25.5 10/21/2021     10/21/2021     Lipids:No results found for: CHOL  No results found for: TRIG  No results found for: HDL  No results found for: LDLCHOLESTEROL, LDLCALC  No results found for: LABVLDL, VLDL  No results found for: CHOLHDLRATIO  CMP:    Lab Results   Component Value Date     12/03/2021    K 4.0 12/03/2021    K 2.9 10/02/2021    CL 98 12/03/2021    CO2 26 12/03/2021    BUN 9 12/03/2021    CREATININE 0.39 12/03/2021    GFRAA >60.0 12/03/2021 LABGLOM >60.0 12/03/2021    GLUCOSE 119 12/03/2021    PROT 6.0 10/01/2021    LABALBU 3.1 10/01/2021    CALCIUM 8.6 12/03/2021    BILITOT 0.3 10/01/2021    ALKPHOS 57 10/01/2021    AST 29 10/01/2021    ALT <5 10/01/2021     BMP:    Lab Results   Component Value Date     12/03/2021    K 4.0 12/03/2021    K 2.9 10/02/2021    CL 98 12/03/2021    CO2 26 12/03/2021    BUN 9 12/03/2021    LABALBU 3.1 10/01/2021    CREATININE 0.39 12/03/2021    CALCIUM 8.6 12/03/2021    GFRAA >60.0 12/03/2021    LABGLOM >60.0 12/03/2021    GLUCOSE 119 12/03/2021     Magnesium:    Lab Results   Component Value Date    MG 1.8 10/02/2021     TSH:  Lab Results   Component Value Date    TSH 2.810 12/03/2021             Patient Active Problem List   Diagnosis    Age-related osteoporosis without current pathological fracture    Anemia, unspecified    Hypothyroidism, unspecified    Rheu arthritis w rheu factor mult site w/o org/sys involv (HCC)    Chronic pain    Dry eye syndrome, bilateral    Secondary Sjogren's syndrome (HCC)    Steroid long-term use    Colitis    Generalized muscle ache    Long-term current use of opiate analgesic    Acquired deformity joint foot    Acquired deformity of hand    Long term (current) use of systemic steroids    Muscle weakness (generalized)    Abnormality of gait    Difficulty swallowing    Fibromyalgia, secondary       There are no discontinued medications. Modified Medications    No medications on file       No orders of the defined types were placed in this encounter. Assessment/Plan:    1. Acute congestive heart failure, unspecified heart failure type ([address]  OK to stop Lasix for now. If edema worsen then resume Laisx. 2. Essential hypertension     - Echo 2D w doppler w color complete; Future - stable. 3. Need to eat more. 4. Dizzy low BP - add Low dose Midodrine.         Counseling:  Heart Healthy Lifestyle, Improve BMI, Low Salt Diet, Increase Nutritional Intake and Walk Daily    Return in about 4 months (around 8/26/2022).     Electronically signed by Denise Calhoun MD on 4/26/2022 at 3:37 PM

## 2022-05-02 ENCOUNTER — COMMUNITY OUTREACH (OUTPATIENT)
Dept: INTERNAL MEDICINE | Age: 67
End: 2022-05-02

## 2022-05-02 NOTE — PROGRESS NOTES
Patient's HM shows they are overdue for AWV, Mammogram, Colorectal Screening and DEXA. Respirics and  files searched without success. No results to attach to order nor HM updated. Note added to upcoming appointment to discuss Care Gap and AWV appt made.

## 2022-05-10 DIAGNOSIS — G89.29 OTHER CHRONIC PAIN: ICD-10-CM

## 2022-05-10 DIAGNOSIS — M79.7 FIBROMYALGIA, SECONDARY: ICD-10-CM

## 2022-05-10 DIAGNOSIS — M05.79 RHEU ARTHRITIS W RHEU FACTOR MULT SITE W/O ORG/SYS INVOLV (HCC): ICD-10-CM

## 2022-05-10 DIAGNOSIS — M81.0 AGE-RELATED OSTEOPOROSIS WITHOUT CURRENT PATHOLOGICAL FRACTURE: ICD-10-CM

## 2022-05-10 DIAGNOSIS — Z51.5 ENCOUNTER FOR PALLIATIVE CARE: ICD-10-CM

## 2022-05-10 RX ORDER — PREGABALIN 20 MG/ML
2 SOLUTION ORAL 4 TIMES DAILY
Qty: 240 ML | Refills: 2 | Status: CANCELLED | OUTPATIENT
Start: 2022-05-10 | End: 2022-08-08

## 2022-05-10 NOTE — PROGRESS NOTES
Rx requested:  Requested Prescriptions     Pending Prescriptions Disp Refills    oxyCODONE-acetaminophen (PERCOCET) 7.5-325 MG per tablet 240 tablet 0     Sig: Take 1 tablet by mouth every 3 hours as needed for Pain for up to 30 days.  pregabalin (LYRICA) 20 MG/ML SOLN solution 240 mL 2     Sig: Take 2 mLs by mouth 4 times daily for 90 days.        Last Office Visit:   Visit date not found      Last filled:  4/12/22  3/1/22    Next Visit Date:  Future Appointments   Date Time Provider Eden Gary   6/6/2022 12:20 PM MD Chris Bills [facility]   6/6/2022  1:00 PM MD Chris Bills 94   6/10/2022 11:00 AM ROBIN Mason - CNP [facility]   8/30/2022  3:00 PM Jefferson Casas MD [facility]

## 2022-05-11 RX ORDER — OXYCODONE AND ACETAMINOPHEN 7.5; 325 MG/1; MG/1
1 TABLET ORAL
Qty: 240 TABLET | Refills: 0 | Status: SHIPPED | OUTPATIENT
Start: 2022-05-11 | End: 2022-06-10 | Stop reason: SDUPTHER

## 2022-05-27 DIAGNOSIS — G89.29 OTHER CHRONIC PAIN: ICD-10-CM

## 2022-05-27 DIAGNOSIS — M79.7 FIBROMYALGIA, SECONDARY: ICD-10-CM

## 2022-05-27 DIAGNOSIS — Z51.5 ENCOUNTER FOR PALLIATIVE CARE: ICD-10-CM

## 2022-05-27 DIAGNOSIS — M81.0 AGE-RELATED OSTEOPOROSIS WITHOUT CURRENT PATHOLOGICAL FRACTURE: ICD-10-CM

## 2022-05-27 DIAGNOSIS — M05.79 RHEU ARTHRITIS W RHEU FACTOR MULT SITE W/O ORG/SYS INVOLV (HCC): ICD-10-CM

## 2022-05-27 RX ORDER — PREGABALIN 20 MG/ML
2 SOLUTION ORAL 4 TIMES DAILY
Qty: 240 ML | Refills: 2 | Status: SHIPPED | OUTPATIENT
Start: 2022-05-27 | End: 2022-08-19 | Stop reason: SDUPTHER

## 2022-05-27 NOTE — PROGRESS NOTES
.  Rx requested:  Requested Prescriptions     Pending Prescriptions Disp Refills    pregabalin (LYRICA) 20 MG/ML SOLN solution 240 mL 2     Sig: Take 2 mLs by mouth 4 times daily for 90 days.        Last Office Visit:   Visit date not found      Last filled:  3/1/22    Next Visit Date:  Future Appointments   Date Time Provider Eden Cookie   6/6/2022 12:20 PM MD Chris Carolina [facility]   6/6/2022  1:00 PM Freida Mckinney MD [facility]   6/10/2022 11:00 AM ROBIN Anaya - CNP [facility]   8/30/2022  3:00 PM Shelby Arrieta MD [facility]

## 2022-06-06 ENCOUNTER — OFFICE VISIT (OUTPATIENT)
Dept: FAMILY MEDICINE CLINIC | Age: 67
End: 2022-06-06

## 2022-06-06 ENCOUNTER — OFFICE VISIT (OUTPATIENT)
Dept: FAMILY MEDICINE CLINIC | Age: 67
End: 2022-06-06
Payer: MEDICARE

## 2022-06-06 VITALS
HEART RATE: 74 BPM | SYSTOLIC BLOOD PRESSURE: 94 MMHG | DIASTOLIC BLOOD PRESSURE: 60 MMHG | TEMPERATURE: 97.5 F | HEIGHT: 63 IN | WEIGHT: 84 LBS | BODY MASS INDEX: 14.88 KG/M2 | OXYGEN SATURATION: 99 %

## 2022-06-06 VITALS
TEMPERATURE: 97.5 F | HEART RATE: 74 BPM | OXYGEN SATURATION: 99 % | SYSTOLIC BLOOD PRESSURE: 94 MMHG | BODY MASS INDEX: 14.88 KG/M2 | HEIGHT: 63 IN | WEIGHT: 84 LBS | DIASTOLIC BLOOD PRESSURE: 60 MMHG

## 2022-06-06 DIAGNOSIS — G89.29 OTHER CHRONIC PAIN: ICD-10-CM

## 2022-06-06 DIAGNOSIS — Z12.12 SCREENING FOR COLORECTAL CANCER: ICD-10-CM

## 2022-06-06 DIAGNOSIS — Z23 NEED FOR PROPHYLACTIC VACCINATION AND INOCULATION AGAINST VARICELLA: ICD-10-CM

## 2022-06-06 DIAGNOSIS — Z51.5 ENCOUNTER FOR PALLIATIVE CARE: ICD-10-CM

## 2022-06-06 DIAGNOSIS — Z12.11 SCREENING FOR COLORECTAL CANCER: ICD-10-CM

## 2022-06-06 DIAGNOSIS — Z12.31 ENCOUNTER FOR SCREENING MAMMOGRAM FOR BREAST CANCER: ICD-10-CM

## 2022-06-06 DIAGNOSIS — Z00.00 INITIAL MEDICARE ANNUAL WELLNESS VISIT: Primary | ICD-10-CM

## 2022-06-06 DIAGNOSIS — F32.0 CURRENT MILD EPISODE OF MAJOR DEPRESSIVE DISORDER WITHOUT PRIOR EPISODE (HCC): Primary | ICD-10-CM

## 2022-06-06 DIAGNOSIS — M05.79 RHEU ARTHRITIS W RHEU FACTOR MULT SITE W/O ORG/SYS INVOLV (HCC): ICD-10-CM

## 2022-06-06 DIAGNOSIS — Z72.89 OTHER PROBLEMS RELATED TO LIFESTYLE: ICD-10-CM

## 2022-06-06 DIAGNOSIS — Z23 NEED FOR PROPHYLACTIC VACCINATION AGAINST STREPTOCOCCUS PNEUMONIAE (PNEUMOCOCCUS): ICD-10-CM

## 2022-06-06 DIAGNOSIS — M81.0 AGE-RELATED OSTEOPOROSIS WITHOUT CURRENT PATHOLOGICAL FRACTURE: ICD-10-CM

## 2022-06-06 DIAGNOSIS — Z78.0 ASYMPTOMATIC MENOPAUSAL STATE: ICD-10-CM

## 2022-06-06 DIAGNOSIS — Z11.59 NEED FOR HEPATITIS C SCREENING TEST: ICD-10-CM

## 2022-06-06 PROCEDURE — 99213 OFFICE O/P EST LOW 20 MIN: CPT | Performed by: FAMILY MEDICINE

## 2022-06-06 PROCEDURE — G8400 PT W/DXA NO RESULTS DOC: HCPCS | Performed by: FAMILY MEDICINE

## 2022-06-06 PROCEDURE — 1123F ACP DISCUSS/DSCN MKR DOCD: CPT | Performed by: FAMILY MEDICINE

## 2022-06-06 PROCEDURE — G8419 CALC BMI OUT NRM PARAM NOF/U: HCPCS | Performed by: FAMILY MEDICINE

## 2022-06-06 PROCEDURE — 3017F COLORECTAL CA SCREEN DOC REV: CPT | Performed by: FAMILY MEDICINE

## 2022-06-06 PROCEDURE — 1036F TOBACCO NON-USER: CPT | Performed by: FAMILY MEDICINE

## 2022-06-06 PROCEDURE — G8427 DOCREV CUR MEDS BY ELIG CLIN: HCPCS | Performed by: FAMILY MEDICINE

## 2022-06-06 PROCEDURE — G0438 PPPS, INITIAL VISIT: HCPCS | Performed by: FAMILY MEDICINE

## 2022-06-06 PROCEDURE — 1090F PRES/ABSN URINE INCON ASSESS: CPT | Performed by: FAMILY MEDICINE

## 2022-06-06 RX ORDER — MIRTAZAPINE 7.5 MG/1
7.5 TABLET, FILM COATED ORAL NIGHTLY
Qty: 30 TABLET | Refills: 0 | Status: SHIPPED | OUTPATIENT
Start: 2022-06-06 | End: 2022-06-29 | Stop reason: SDUPTHER

## 2022-06-06 RX ORDER — OXYCODONE AND ACETAMINOPHEN 7.5; 325 MG/1; MG/1
1 TABLET ORAL
Qty: 240 TABLET | Refills: 0 | Status: CANCELLED | OUTPATIENT
Start: 2022-06-06 | End: 2022-07-06

## 2022-06-06 ASSESSMENT — ENCOUNTER SYMPTOMS
DIARRHEA: 0
VOMITING: 0
CONSTIPATION: 0
BLOOD IN STOOL: 0
CHEST TIGHTNESS: 0
SHORTNESS OF BREATH: 0
ABDOMINAL PAIN: 0
NAUSEA: 0
APNEA: 0
COUGH: 0

## 2022-06-06 ASSESSMENT — PATIENT HEALTH QUESTIONNAIRE - PHQ9
3. TROUBLE FALLING OR STAYING ASLEEP: 0
SUM OF ALL RESPONSES TO PHQ QUESTIONS 1-9: 9
8. MOVING OR SPEAKING SO SLOWLY THAT OTHER PEOPLE COULD HAVE NOTICED. OR THE OPPOSITE, BEING SO FIGETY OR RESTLESS THAT YOU HAVE BEEN MOVING AROUND A LOT MORE THAN USUAL: 0
7. TROUBLE CONCENTRATING ON THINGS, SUCH AS READING THE NEWSPAPER OR WATCHING TELEVISION: 0
4. FEELING TIRED OR HAVING LITTLE ENERGY: 3
2. FEELING DOWN, DEPRESSED OR HOPELESS: 3
9. THOUGHTS THAT YOU WOULD BE BETTER OFF DEAD, OR OF HURTING YOURSELF: 0
SUM OF ALL RESPONSES TO PHQ9 QUESTIONS 1 & 2: 6
6. FEELING BAD ABOUT YOURSELF - OR THAT YOU ARE A FAILURE OR HAVE LET YOURSELF OR YOUR FAMILY DOWN: 0
10. IF YOU CHECKED OFF ANY PROBLEMS, HOW DIFFICULT HAVE THESE PROBLEMS MADE IT FOR YOU TO DO YOUR WORK, TAKE CARE OF THINGS AT HOME, OR GET ALONG WITH OTHER PEOPLE: 0
1. LITTLE INTEREST OR PLEASURE IN DOING THINGS: 3
SUM OF ALL RESPONSES TO PHQ QUESTIONS 1-9: 9
5. POOR APPETITE OR OVEREATING: 0

## 2022-06-06 ASSESSMENT — LIFESTYLE VARIABLES: HOW OFTEN DO YOU HAVE A DRINK CONTAINING ALCOHOL: NEVER

## 2022-06-06 NOTE — PROGRESS NOTES
Medicare Annual Wellness Visit    Itz Jones is here for Medicare AWV  States that she is doing better than she was previously. She follow-up with cardiology and is no longer needed the Lasix to assist in her lower extremity edema. She has follow-up with rheumatology with no major changes. She continues to follow-up with palliative care who sees her once a month. Appetite has improved and patient is now having more regular bowel movements. She continues on both omeprazole and Reglan. She declines any colon cancer screening, breast cancer screening, cholesterol screening or immunization    Assessment & Plan   Initial Medicare annual wellness visit  -     CBC with Auto Differential; Future  -     Comprehensive Metabolic Panel; Future  Asymptomatic menopausal state  Encounter for screening mammogram for breast cancer  Need for hepatitis C screening test  -     Hepatitis C Antibody; Future  Need for prophylactic vaccination against Streptococcus pneumoniae (pneumococcus)  Need for prophylactic vaccination and inoculation against varicella  Other problems related to lifestyle  -     Hepatitis C Antibody; Future  Screening for colorectal cancer      Recommendations for Preventive Services Due: see orders and patient instructions/AVS.  Recommended screening schedule for the next 5-10 years is provided to the patient in written form: see Patient Instructions/AVS.     Return for Medicare Annual Wellness Visit in 1 year. Subjective       Patient's complete Health Risk Assessment and screening values have been reviewed and are found in Flowsheets. The following problems were reviewed today and where indicated follow up appointments were made and/or referrals ordered.     Positive Risk Factor Screenings with Interventions:    Fall Risk:  Do you feel unsteady or are you worried about falling? : (!) yes  2 or more falls in past year?: no  Fall with injury in past year?: no     Fall Risk Interventions: · Home safety tips provided     Depression:  PHQ-2 Score: 6  PHQ-9 Total Score: 9    Severity:1-4 = minimal depression, 5-9 = mild depression, 10-14 = moderate depression, 15-19 = moderately severe depression, 20-27 = severe depression    Depression Interventions:  · Medication prescribed- Remeron            Opioid Risk: (Low risk score <55) Opioid risk score: 27    Patient is low risk for opioid use disorder or overdose.   Last PDMP [place] Records as Reviewed:  Review User Review Instant Review Result   Clifford Rota 5/11/2022  7:47 AM Reviewed PDMP [1]        General Health and ACP:  General  In general, how would you say your health is?: (!) Poor  In the past 7 days, have you experienced any of the following: New or Increased Pain, New or Increased Fatigue, Loneliness, Social Isolation, Stress or Anger?: (!) Yes  Select all that apply: (!) New or Increased Pain,New or Increased Fatigue,Loneliness,Social Isolation,Stress,Anger  Do you get the social and emotional support that you need?: Yes  Do you have a Living Will?: Yes    Advance Directives     Power of  Living Will ACP-Advance Directive ACP-Power of     Not on File Not on File Not on File Not on File      General Health Risk Interventions:  · No intervention needed    Health Habits/Nutrition:     Physical Activity: Inactive    Days of Exercise per Week: 0 days    Minutes of Exercise per Session: 0 min     Have you lost any weight without trying in the past 3 months?: No  Body mass index: (!) 14.88  Have you seen the dentist within the past year?: Yes    Health Habits/Nutrition Interventions:  · No intervention needed at this time      ADLs:  In the past 7 days, did you need help from others to perform any of the following everyday activities: Eating, dressing, grooming, bathing, toileting, or walking/balance?: (!) Yes  Select all that apply: (!) Eating,Dressing,Grooming,Bathing,Toileting,Walking/Balance  In the past 7 days, did you need help from others to take care of any of the following: Laundry, housekeeping, banking/finances, shopping, telephone use, food preparation, transportation, or taking medications?: (!) Yes  Select all that apply: (!) Food Preparation,Transportation,Taking Medications,Shopping,Banking/Finances,Housekeeping,Laundry    ADL Interventions:  · Patient declines any further evaluation/treatment for this issue          Objective   Vitals:    06/06/22 1219   BP: 94/60   Pulse: 74   Temp: 97.5 °F (36.4 °C)   TempSrc: Infrared   SpO2: 99%   Weight: 84 lb (38.1 kg)   Height: 5' 3\" (1.6 m)      Body mass index is 14.88 kg/m². Physical Exam  Vitals and nursing note reviewed. Constitutional:       General: She is not in acute distress. Appearance: Normal appearance. She is well-developed. She is not diaphoretic. Comments: Underweight  In wheelchair   HENT:      Head: Normocephalic and atraumatic. Right Ear: Tympanic membrane, ear canal and external ear normal. There is no impacted cerumen. Left Ear: Tympanic membrane, ear canal and external ear normal. There is no impacted cerumen. Nose: Nose normal.      Mouth/Throat:      Mouth: Mucous membranes are moist.      Pharynx: Oropharynx is clear. No oropharyngeal exudate or posterior oropharyngeal erythema. Eyes:      Extraocular Movements: Extraocular movements intact. Conjunctiva/sclera: Conjunctivae normal.      Pupils: Pupils are equal, round, and reactive to light. Cardiovascular:      Rate and Rhythm: Normal rate and regular rhythm. Pulses: Normal pulses. Heart sounds: Normal heart sounds. No murmur heard. No friction rub. No gallop. Pulmonary:      Effort: Pulmonary effort is normal. No respiratory distress. Breath sounds: Normal breath sounds. No wheezing or rales. Chest:      Chest wall: No tenderness. Abdominal:      General: Abdomen is flat. Bowel sounds are normal. There is no distension.       Palpations: Abdomen is soft. There is no mass. Tenderness: There is no abdominal tenderness. Hernia: No hernia is present. Musculoskeletal:      Cervical back: Normal range of motion and neck supple. Right lower leg: No edema. Left lower leg: No edema. Comments: Contractures of bilateral hands and fingers  Inward rotated right foot/ankle contracture   Skin:     General: Skin is warm and dry. Neurological:      Mental Status: She is alert and oriented to person, place, and time. Psychiatric:         Mood and Affect: Mood normal.         Behavior: Behavior normal.         Thought Content: Thought content normal.         Judgment: Judgment normal.             Allergies   Allergen Reactions    Hydrocodone-Acetaminophen Other (See Comments)     Dizzy, Nausea.  Hydroxychloroquine Other (See Comments)     Nausea, stomach upset    Indomethacin Other (See Comments)     Dizziness, nausea, rash and vomiting    Amoxicillin Diarrhea and Nausea And Vomiting    Lactose Nausea And Vomiting    Other      \"Cheap metals\"  rash     Prior to Visit Medications    Medication Sig Taking? Authorizing Provider   pregabalin (LYRICA) 20 MG/ML SOLN solution Take 2 mLs by mouth 4 times daily for 90 days. Yes ROBIN Villalta CNP   oxyCODONE-acetaminophen (PERCOCET) 7.5-325 MG per tablet Take 1 tablet by mouth every 3 hours as needed for Pain for up to 30 days.  Yes ROBIN Hernandez CNP   midodrine (PROAMATINE) 2.5 MG tablet Take 1 tablet by mouth 3 times daily Yes Flor Mustafa MD   pantoprazole (PROTONIX) 40 MG tablet Take 1 tablet by mouth every morning (before breakfast) Yes ROBIN Villalta CNP   metoclopramide (REGLAN) 10 MG tablet Take 0.5 tablets by mouth 3 times daily (with meals) Yes Kirk Rosen DO   etanercept (ENBREL) 50 MG/ML injection Inject into the skin once a week Takes monday Yes Historical Provider, MD   folic acid (FOLVITE) 1 MG tablet TAKE 1 TABLET BY MOUTH ONCE DAILY Yes Historical Provider, MD   predniSONE (DELTASONE) 5 MG tablet Take 5 mg by mouth daily Yes Historical Provider, MD   Calcium Carbonate-Vit D-Min (CALTRATE 600+D PLUS PO) Take by mouth daily Yes Historical Provider, MD   levothyroxine (SYNTHROID) 100 MCG tablet TAKE 1 TABLET BY MOUTH ONCE DAILY Yes Historical Provider, MD   Lifitegrast (XIIDRA) 5 % SOLN Apply 1 drop to eye 2 times daily Yes Historical Provider, MD   zoledronic acid (RECLAST) 5 MG/100ML SOLN Infuse 5 mg intravenously once Yearly Yes Historical Provider, MD   Multiple Vitamins-Minerals (CENTRUM ADULTS PO) Take by mouth Yes Historical Provider, MD   Carboxymethylcellul-Glycerin ([place] OP) Apply to eye Yes Historical Provider, MD   mirtazapine (REMERON) 7.5 MG tablet Take 1 tablet by mouth nightly  Fatemeh Russ MD   furosemide (LASIX) 20 MG tablet Take 0.5 tablets by mouth daily as needed (edema)  Patient not taking: Reported on 4/26/2022  Sabina Curling, APRN - CNP       CareTeam (Including outside providers/suppliers regularly involved in providing care):   Patient Care Team:  Fatemeh Russ MD as PCP - General (Family Medicine)  Fatemeh Russ [facility] as PCP [facility]  Taz Chin MD as Cardiologist (Cardiology)  ROBIN Villa CNP as Nurse Practitioner (Nurse Practitioner)     Reviewed and updated this visit:  Allergies  Meds                  Advance Care Planning   Advanced Care Planning: Discussed the patients choices for care and treatment in case of a health event that adversely affects decision-making abilities. Also discussed the patients long-term treatment options. Reviewed with the patient the appropriate state-specific advance directive documents. Reviewed the process of designating a competent adult as an Agent (or -in-fact) that could take make health care decisions for the patient if incompetent.  Patient was asked to complete the declaration forms, either acknowledge the forms by a public notary or an eligible witness and provide a signed copy to the practice office.   Time spent (minutes): 3

## 2022-06-06 NOTE — PROGRESS NOTES
Subjective:      Patient ID: Peggy Kaur is a 78 y.o. female who presents today for:     Chief Complaint   Patient presents with    Depression       HPI  Patient is a very pleasant 71-year-old who presents today to follow-up on depression. She states that since last year she had has had worsening mood due to her chronic pain and be difficulty she is experiencing with end-stage rheumatoid arthritis that is because contractures and limited mobility as well as dependence on her . She denies any suicidal or homicidal ideations but does endorse a depressed mood. She would like to explore medication options. She has previously had a decreased appetite and has had chronic trouble sleeping due to discomfort. Past Medical History:   Diagnosis Date    [facility]est[facility] failure) [facility])     Fibromyalgia     Fracture of pelvis ([facility])     left    History of left hip replacement     History of total left knee replacement     History of total right knee replacement     Light sensitivity     bilateral eye    Osteoporosis     Rheumatoid arthritis ([address]     Sjogrens syndrome ([address]      Past Surgical History:   Procedure Laterality Date    CATARACT REMOVAL WITH IMPLANT  01/2013    bilateral    CHOLECYSTECTOMY      OTHER SURGICAL HISTORY      bilateral eye implant    OVARY REMOVAL  06/2000    right    REVISION TOTAL HIP ARTHROPLASTY  10/2012    left    TOTAL HIP ARTHROPLASTY  04/1990    left    TOTAL KNEE ARTHROPLASTY  08/1997    right and left     History reviewed. No pertinent family history.   Social History     Socioeconomic History    Marital status:      Spouse name: Not on file    Number of children: Not on file    Years of education: Not on file    Highest education level: Not on file   Occupational History    Not on file   Tobacco Use    Smoking status: Never Smoker    Smokeless tobacco: Never Used   Vaping Use    Vaping Use: Never used   Substance and Sexual Activity    Alcohol use: Never    Drug use: Never    Sexual activity: Not Currently   Other Topics Concern    Not on file   Social History Narrative    Not on file     Social Determinants of Health     Financial Resource Strain: Low Risk     Difficulty of Paying Living Expenses: Not hard at all   Food Insecurity: No Food Insecurity    Worried About Running Out of Food in the Last Year: Never true    [address] in the Last Year: Never true   Transportation Needs:     Lack of Transportation (Medical): Not on file    Lack of Transportation (Non-Medical): Not on file   Physical Activity: Inactive    Days of Exercise per Week: 0 days    Minutes of Exercise per Session: 0 min   Stress:     Feeling of Stress : Not on file   Social Connections:     Frequency of Communication with Friends and Family: Not on file    Frequency of Social Gatherings with Friends and Family: Not on file    Attends Restoration Services: Not on file    Active Member of [address] [place] [address] [employer] or Organizations: Not on file    Attends Club or Organization Meetings: Not on file    Marital Status: Not on file   Intimate Partner Violence:     Fear of Current or Ex-Partner: Not on file    Emotionally Abused: Not on file    Physically Abused: Not on file    Sexually Abused: Not on file   Housing Stability:     Unable to Pay for Housing in the Last Year: Not on file    Number of [place] in the Last Year: Not on file    Unstable Housing in the Last Year: Not on file     Current Outpatient Medications on File Prior to Visit   Medication Sig Dispense Refill    pregabalin (LYRICA) 20 MG/ML SOLN solution Take 2 mLs by mouth 4 times daily for 90 days. 240 mL 2    oxyCODONE-acetaminophen (PERCOCET) 7.5-325 MG per tablet Take 1 tablet by mouth every 3 hours as needed for Pain for up to 30 days.  240 tablet 0    furosemide (LASIX) 20 MG tablet Take 0.5 tablets by mouth daily as needed (edema) (Patient not taking: Reported on 4/26/2022) 90 tablet 3    midodrine (PROAMATINE) 2.5 MG tablet Take 1 tablet by mouth 3 times daily 90 tablet 5    pantoprazole (PROTONIX) 40 MG tablet Take 1 tablet by mouth every morning (before breakfast) 30 tablet 11    metoclopramide (REGLAN) 10 MG tablet Take 0.5 tablets by mouth 3 times daily (with meals) 120 tablet 3    etanercept (ENBREL) 50 MG/ML injection Inject into the skin once a week Takes monday      folic acid (FOLVITE) 1 MG tablet TAKE 1 TABLET BY MOUTH ONCE DAILY      predniSONE (DELTASONE) 5 MG tablet Take 5 mg by mouth daily      Calcium Carbonate-Vit D-Min (CALTRATE 600+D PLUS PO) Take by mouth daily      levothyroxine (SYNTHROID) 100 MCG tablet TAKE 1 TABLET BY MOUTH ONCE DAILY      Lifitegrast (XIIDRA) 5 % SOLN Apply 1 drop to eye 2 times daily      zoledronic acid (RECLAST) 5 MG/100ML SOLN Infuse 5 mg intravenously once Yearly      Multiple Vitamins-Minerals (CENTRUM ADULTS PO) Take by mouth      Carboxymethylcellul-Glycerin (REFRESH RELIEVA OP) Apply to eye       No current facility-administered medications on file prior to visit. Allergies:  Hydrocodone-acetaminophen, Hydroxychloroquine, Indomethacin, Amoxicillin, Lactose, and Other    Review of Systems   Constitutional: Negative for activity change, appetite change, fatigue, fever and unexpected weight change. Respiratory: Negative for apnea, cough, chest tightness and shortness of breath. Cardiovascular: Negative for chest pain, palpitations and leg swelling. Gastrointestinal: Negative for abdominal pain, blood in stool, constipation, diarrhea, nausea and vomiting. Musculoskeletal: Negative for arthralgias. Neurological: Negative for seizures and headaches. Psychiatric/Behavioral: Positive for dysphoric mood and sleep disturbance. Negative for decreased concentration, hallucinations, self-injury and suicidal ideas. The patient is not nervous/anxious and is not hyperactive.         Objective:   BP 94/60   Pulse 74   Temp 97.5 °F (36.4 °C) (Infrared)   Ht 5' 3\" (1.6 m)   Wt 84 lb (38.1 kg) Comment: pt reported  SpO2 99%   BMI 14.88 kg/m²     Physical Exam  Vitals and nursing note reviewed. Constitutional:       General: She is not in acute distress. Appearance: Normal appearance. She is well-developed. She is not diaphoretic. HENT:      Head: Normocephalic and atraumatic. Nose: Nose normal.      Mouth/Throat:      Mouth: Mucous membranes are moist.      Pharynx: Oropharynx is clear. Eyes:      Conjunctiva/sclera: Conjunctivae normal.      Pupils: Pupils are equal, round, and reactive to light. Cardiovascular:      Rate and Rhythm: Normal rate and regular rhythm. Heart sounds: Normal heart sounds. No murmur heard. No friction rub. No gallop. Pulmonary:      Effort: Pulmonary effort is normal. No respiratory distress. Breath sounds: Normal breath sounds. No wheezing or rales. Chest:      Chest wall: No tenderness. Abdominal:      General: Abdomen is flat. Bowel sounds are normal.      Palpations: Abdomen is soft. Tenderness: There is no abdominal tenderness. Musculoskeletal:      Cervical back: Normal range of motion. Skin:     General: Skin is warm and dry. Neurological:      Mental Status: She is alert and oriented to person, place, and time. Psychiatric:         Mood and Affect: Mood is depressed. Behavior: Behavior normal.         Thought Content: Thought content normal.         Judgment: Judgment normal.         Assessment & Plan:     1. Current mild episode of major depressive disorder without prior episode [facility])  With shared decision making, went over available options. We will choose low-dose Remeron and monitor symptoms closely. - mirtazapine (REMERON) 7.5 MG tablet; Take 1 tablet by mouth nightly  Dispense: 30 tablet; Refill: 0      Return in about 1 month (around 7/6/2022).     Diomedes Zambrano MD

## 2022-06-06 NOTE — PATIENT INSTRUCTIONS
Advance Directives: Care Instructions  Overview  An advance directive is a legal way to state your wishes at the end of your life. It tells your family and your doctor what to do if you can't say what youwant. There are two main types of advance directives. You can change them any timeyour wishes change. Living will. This form tells your family and your doctor your wishes about life support and other treatment. The form is also called a declaration. Medical power of . This form lets you name a person to make treatment decisions for you when you can't speak for yourself. This person is called a health care agent (health care proxy, health care surrogate). The form is also called a durable power of  for health care. If you do not have an advance directive, decisions about your medical care maybe made by a family member, or by a doctor or a  who doesn't know you. It may help to think of an advance directive as a gift to the people who carefor you. If you have one, they won't have to make tough decisions by themselves. Follow-up care is a key part of your treatment and safety. Be sure to make and go to all appointments, and call your doctor if you are having problems. It's also a good idea to know your test results and keep alist of the medicines you take. What should you include in an advance directive? Many states have a unique advance directive form. (It may ask you to address specific issues.) Or you might use a universal form that's approved by manystates. If your form doesn't tell you what to address, it may be hard to know what to include in your advance directive. Use the questions below to help you getstarted.  Who do you want to make decisions about your medical care if you are not able to?  What life-support measures do you want if you have a serious illness that gets worse over time or can't be cured?  What are you most afraid of that might happen?  (Maybe you're afraid of having pain, losing your independence, or being kept alive by machines.)   Where would you prefer to die? (Your home? A hospital? A nursing home?)   Do you want to donate your organs when you die?  Do you want certain Ep[employer] practices performed before you die? When should you call for help? Be sure to contact your doctor if you have any questions. Where can you learn more? Go to [email or web address] and sign in to your [employer] account. Enter R264 in the [employer] box to learn more about \"Advance Directives: Care Instructions. \"     If you do not have an account, please click on the \"Sign Up Now\" link. Current as of: October 18, 2021               Content Version: 13.2  © 2429-8719 Healthwise, [employer]. Care instructions adapted under license by [facility] ([facility]). If you have questions about a medical condition or this instruction, always ask your healthcare professional. [place] [address] any warranty or liability for your use of this information. Personalized Preventive Plan for Layne Saab - 6/6/2022  Medicare offers a range of preventive health benefits. Some of the tests and screenings are paid in full while other may be subject to a deductible, co-insurance, and/or copay. Some of these benefits include a comprehensive review of your medical history including lifestyle, illnesses that may run in your family, and various assessments and screenings as appropriate. After reviewing your medical record and screening and assessments performed today your provider may have ordered immunizations, labs, imaging, and/or referrals for you. A list of these orders (if applicable) as well as your Preventive Care list are included within your After Visit Summary for your review.     Other Preventive Recommendations:    · A preventive eye exam performed by an eye specialist is recommended every 1-2 years to screen for glaucoma; cataracts, macular degeneration, and other eye disorders. · A preventive dental visit is recommended every 6 months. · Try to get at least 150 minutes of exercise per week or 10,000 steps per day on a pedometer . · Order or download the FREE \"Exercise & Physical Activity: Your Everyday Guide\" from [employer]. Call 3-699.455.7677 or search [employer] online. · You need 4620-2445 mg of calcium and 6708-3851 IU of vitamin D per day. It is possible to meet your calcium requirement with diet alone, but a vitamin D supplement is usually necessary to meet this goal.  · When exposed to the sun, use a sunscreen that protects against both UVA and UVB radiation with an SPF of 30 or greater. Reapply every 2 to 3 hours or after sweating, drying off with a towel, or swimming. · Always wear a seat belt when traveling in a car. Always wear a helmet when riding a bicycle or motorcycle.

## 2022-06-06 NOTE — PROGRESS NOTES
Santi Osborn- you see this patient on 6/10 which is when her next refill is due. Not sure if you are going to continue this exact pain regimen. Something for you to address during the visit, thank you. Rx requested:  Requested Prescriptions     Pending Prescriptions Disp Refills    oxyCODONE-acetaminophen (PERCOCET) 7.5-325 MG per tablet 240 tablet 0     Sig: Take 1 tablet by mouth every 3 hours as needed for Pain for up to 30 days.        Last Office Visit:   Visit date not found      Last filled:  5/11/22    Next Visit Date:  Future Appointments   Date Time Provider [place]   6/6/2022 12:20 PM [address]   6/6/2022  1:00 PM [address], MD [address]   6/10/2022 11:00 AM ROBIN Dent - CNP [facility]   8/30/2022  3:00 PM Padmini Delgado MD [facility]

## 2022-06-10 ENCOUNTER — OFFICE VISIT (OUTPATIENT)
Dept: PALLATIVE CARE | Age: 67
End: 2022-06-10
Payer: MEDICARE

## 2022-06-10 VITALS
TEMPERATURE: 99.1 F | RESPIRATION RATE: 16 BRPM | SYSTOLIC BLOOD PRESSURE: 112 MMHG | DIASTOLIC BLOOD PRESSURE: 78 MMHG | HEART RATE: 80 BPM | OXYGEN SATURATION: 92 %

## 2022-06-10 DIAGNOSIS — R13.10 DYSPHAGIA, UNSPECIFIED TYPE: ICD-10-CM

## 2022-06-10 DIAGNOSIS — Z79.52 LONG TERM (CURRENT) USE OF SYSTEMIC STEROIDS: ICD-10-CM

## 2022-06-10 DIAGNOSIS — M81.0 AGE-RELATED OSTEOPOROSIS WITHOUT CURRENT PATHOLOGICAL FRACTURE: ICD-10-CM

## 2022-06-10 DIAGNOSIS — G89.29 OTHER CHRONIC PAIN: Primary | ICD-10-CM

## 2022-06-10 DIAGNOSIS — R26.9 ABNORMALITY OF GAIT: ICD-10-CM

## 2022-06-10 DIAGNOSIS — M05.79 RHEU ARTHRITIS W RHEU FACTOR MULT SITE W/O ORG/SYS INVOLV (HCC): ICD-10-CM

## 2022-06-10 DIAGNOSIS — M24.50 CONTRACTURE OF JOINT OF MULTIPLE SITES: ICD-10-CM

## 2022-06-10 DIAGNOSIS — M79.7 FIBROMYALGIA, SECONDARY: ICD-10-CM

## 2022-06-10 DIAGNOSIS — I50.9 CHRONIC CONGESTIVE HEART FAILURE, UNSPECIFIED HEART FAILURE TYPE (HCC): ICD-10-CM

## 2022-06-10 DIAGNOSIS — Z51.5 ENCOUNTER FOR PALLIATIVE CARE: ICD-10-CM

## 2022-06-10 DIAGNOSIS — R60.9 EDEMA, UNSPECIFIED TYPE: ICD-10-CM

## 2022-06-10 DIAGNOSIS — F32.A DEPRESSION, UNSPECIFIED DEPRESSION TYPE: ICD-10-CM

## 2022-06-10 PROCEDURE — 1123F ACP DISCUSS/DSCN MKR DOCD: CPT | Performed by: NURSE PRACTITIONER

## 2022-06-10 PROCEDURE — 3017F COLORECTAL CA SCREEN DOC REV: CPT | Performed by: NURSE PRACTITIONER

## 2022-06-10 PROCEDURE — 1036F TOBACCO NON-USER: CPT | Performed by: NURSE PRACTITIONER

## 2022-06-10 PROCEDURE — G8419 CALC BMI OUT NRM PARAM NOF/U: HCPCS | Performed by: NURSE PRACTITIONER

## 2022-06-10 PROCEDURE — 99349 HOME/RES VST EST MOD MDM 40: CPT | Performed by: NURSE PRACTITIONER

## 2022-06-10 PROCEDURE — 1090F PRES/ABSN URINE INCON ASSESS: CPT | Performed by: NURSE PRACTITIONER

## 2022-06-10 RX ORDER — OXYCODONE AND ACETAMINOPHEN 7.5; 325 MG/1; MG/1
1 TABLET ORAL
Qty: 240 TABLET | Refills: 0 | Status: SHIPPED | OUTPATIENT
Start: 2022-06-10 | End: 2022-07-10

## 2022-06-10 ASSESSMENT — ENCOUNTER SYMPTOMS
TROUBLE SWALLOWING: 0
DIARRHEA: 0
COUGH: 0
NAUSEA: 0
VOMITING: 0
WHEEZING: 0
BACK PAIN: 1
GASTROINTESTINAL NEGATIVE: 1
ALLERGIC/IMMUNOLOGIC NEGATIVE: 1
SHORTNESS OF BREATH: 0
CONSTIPATION: 0
ABDOMINAL PAIN: 0
COLOR CHANGE: 0

## 2022-06-10 NOTE — PROGRESS NOTES
Subjective:      Patient Id: Seen Meredith Morel at  [facility] for symptom management. She was accompanied to the appointment by: her     Chief Complaint   Patient presents with    Follow-up    Pain      HPI       Layne Saab is a 78 y.o. female seen for symptom management. Meredith Morel has complex medical history that includes fibromyagia, OA, and End stage RA with multiple contractures. Home visit is necessary in lieu of office due to significant frailty and high symptom burden from comorbid illnesses. Of note, first time seeing patient, as he was previously being followed by another [address] [place] [address] provider. PT A+Ox4, up walking with rollator, weak gait. States pain is controlled on current regime following exacerbation. Rates the pain at a Pain Score:   5 on a scale of 0 to 10. States it occurs in joints throughout body. Hx Ra with multiple Contractures of joints noted throughout body. Describes pain as a constant ache that intermittently worsens. Currently taking Percocet 7.5/325 1 tabs every 3hrs PRN and states it relieves pain except on days with weather changes. Denies Sedation, constipation, or other adverse effects on current regime. Fibromyalgia and neuropathic pain controlled on oral lyrica concentrate. Edema at baseline. Follows with Dr. Nelly Lang. Currently off Lasix due to hypotension. Dr. Nelly Lang managing diuretics. Started on Midodrine 2.5 mg PO TID for hypotension. Patient does not monitor BP at home. Instructed to buy a BP cuff and monitor daily. Follows with PCP Dr. Feng Silva. Pt with recent c/o depression and was started on Mitrazapine  7.5 mg nightly, recently started 4 days ago. Pt follows with Dr. Salome Brown at [facility] for rheumatology. Weight subjectively stable. Pt reports good appetite, no dysphagia. Sleeping well. No changes in bowel or bladder. No further complaints or concerns.     Past Medical History:   Diagnosis Date    CHF (congestive heart failure) (HCC)     Fibromyalgia  Fracture of pelvis ([facility]     left    History of left hip replacement     History of total left knee replacement     History of total right knee replacement     Light sensitivity     bilateral eye    Osteoporosis     Rheumatoid arthritis ([facility]     Sjogrens syndrome ([facility]      Past Surgical History:   Procedure Laterality Date    CATARACT REMOVAL WITH IMPLANT  01/2013    bilateral    CHOLECYSTECTOMY      OTHER SURGICAL HISTORY      bilateral eye implant    OVARY REMOVAL  06/2000    right    REVISION TOTAL HIP ARTHROPLASTY  10/2012    left    TOTAL HIP ARTHROPLASTY  04/1990    left    TOTAL KNEE ARTHROPLASTY  08/1997    right and left     Social History     Socioeconomic History    Marital status:      Spouse name: Not on file    Number of children: Not on file    Years of education: Not on file    Highest education level: Not on file   Occupational History    Not on file   Tobacco Use    Smoking status: Never Smoker    Smokeless tobacco: Never Used   Vaping Use    Vaping Use: Never used   Substance and Sexual Activity    Alcohol use: Never    Drug use: Never    Sexual activity: Not Currently   Other Topics Concern    Not on file   Social History Narrative    Not on file     Social Determinants of Health     Financial Resource Strain: Low Risk     Difficulty of Paying Living Expenses: Not hard at all   Food Insecurity: No Food Insecurity    Worried About Running Out of Food in the Last Year: Never true    Kylah of Food in the Last Year: Never true   Transportation Needs:     Lack of Transportation (Medical): Not on file    Lack of Transportation (Non-Medical):  Not on file   Physical Activity: Inactive    Days of Exercise per Week: 0 days    Minutes of Exercise per Session: 0 min   Stress:     Feeling of Stress : Not on file   Social Connections:     Frequency of Communication with Friends and Family: Not on file    Frequency of Social Gatherings with Friends and Family: Not on file    Attends Spiritism Services: Not on file    Active Member of Clubs or Organizations: Not on file    Attends Club or Organization Meetings: Not on file    Marital Status: Not on file   Intimate Partner Violence:     Fear of Current or Ex-Partner: Not on file    Emotionally Abused: Not on file    Physically Abused: Not on file    Sexually Abused: Not on file   Housing Stability:     Unable to Pay for Housing in the Last Year: Not on file    Number of [place] in the Last Year: Not on file    Unstable Housing in the Last Year: Not on file     History reviewed. No pertinent family history. Allergies   Allergen Reactions    Hydrocodone-Acetaminophen Other (See Comments)     Dizzy, Nausea.  Hydroxychloroquine Other (See Comments)     Nausea, stomach upset    Indomethacin Other (See Comments)     Dizziness, nausea, rash and vomiting    Amoxicillin Diarrhea and Nausea And Vomiting    Lactose Nausea And Vomiting    Other      \"Cheap metals\"  rash     Current Outpatient Medications on File Prior to Visit   Medication Sig Dispense Refill    mirtazapine (REMERON) 7.5 MG tablet Take 1 tablet by mouth nightly 30 tablet 0    pregabalin (LYRICA) 20 MG/ML SOLN solution Take 2 mLs by mouth 4 times daily for 90 days.  240 mL 2    midodrine (PROAMATINE) 2.5 MG tablet Take 1 tablet by mouth 3 times daily 90 tablet 5    pantoprazole (PROTONIX) 40 MG tablet Take 1 tablet by mouth every morning (before breakfast) 30 tablet 11    metoclopramide (REGLAN) 10 MG tablet Take 0.5 tablets by mouth 3 times daily (with meals) 120 tablet 3    etanercept (ENBREL) 50 MG/ML injection Inject into the skin once a week Takes monday      folic acid (FOLVITE) 1 MG tablet TAKE 1 TABLET BY MOUTH ONCE DAILY      predniSONE (DELTASONE) 5 MG tablet Take 5 mg by mouth daily      Calcium Carbonate-Vit D-Min (CALTRATE 600+D PLUS PO) Take by mouth daily      levothyroxine (SYNTHROID) 100 MCG tablet TAKE 1 TABLET BY MOUTH ONCE DAILY      Lifitegrast (XIIDRA) 5 % SOLN Apply 1 drop to eye 2 times daily      zoledronic acid (RECLAST) 5 MG/100ML SOLN Infuse 5 mg intravenously once Yearly      Multiple Vitamins-Minerals (CENTRUM ADULTS PO) Take by mouth      Carboxymethylcellul-Glycerin (REFRESH RELIEVA OP) Apply to eye      furosemide (LASIX) 20 MG tablet Take 0.5 tablets by mouth daily as needed (edema) (Patient not taking: Reported on 4/26/2022) 90 tablet 3     No current facility-administered medications on file prior to visit. Review of Systems   Constitutional: Positive for activity change. Negative for appetite change, fatigue, fever and unexpected weight change. HENT: Negative for trouble swallowing. Respiratory: Negative for cough, shortness of breath and wheezing. Cardiovascular: Negative for chest pain and leg swelling. Gastrointestinal: Negative. Negative for abdominal pain, constipation, diarrhea, nausea and vomiting. Endocrine: Negative. Genitourinary: Negative. Musculoskeletal: Positive for arthralgias, back pain and joint swelling. Negative for myalgias. Skin: Negative. Negative for color change and rash. Allergic/Immunologic: Negative. Neurological: Positive for weakness. Negative for dizziness, tremors and syncope. Hematological: Negative. Psychiatric/Behavioral: Positive for dysphoric mood. Negative for agitation, confusion, decreased concentration and sleep disturbance. The patient is not nervous/anxious. Objective:   /78   Pulse 80   Temp 99.1 °F (37.3 °C)   Resp 16   SpO2 92%    Wt Readings from Last 3 Encounters:   06/06/22 84 lb (38.1 kg)   06/06/22 84 lb (38.1 kg)   10/15/21 73 lb 9.6 oz (33.4 kg)     Physical Exam  Vitals reviewed. Constitutional:       Appearance: She is well-developed. She is ill-appearing. Comments: Thin and frail appearing   HENT:      Head: Normocephalic.    Eyes:      Pupils: Pupils are equal, round, and reactive to light. Cardiovascular:      Rate and Rhythm: Normal rate and regular rhythm. Pulses: Normal pulses. Pulmonary:      Effort: Pulmonary effort is normal.      Breath sounds: Normal breath sounds. Abdominal:      General: Bowel sounds are normal. There is no distension. Palpations: Abdomen is soft. Tenderness: There is no abdominal tenderness. There is no guarding. Musculoskeletal:         General: Normal range of motion. Cervical back: Normal range of motion. Right lower leg: No edema. Left lower leg: No edema. Comments: Contractures of bilateral hands and right foot   Skin:     General: Skin is warm and dry. Neurological:      Mental Status: She is alert and oriented to person, place, and time. Motor: Weakness present. Gait: Gait abnormal.   Psychiatric:         Behavior: Behavior normal.         Thought Content: Thought content normal.         Assessment and Plan:      1. Age-related osteoporosis without current pathological fracture  2. Rheu arthritis w rheu factor mult site w/o org/sys involv ([facility]  3. Contracture of joint of multiple sites  4. Other chronic pain  5. Fibromyalgia  Controlled on current regimen, prescribed by previous provider: Percocet 7.5/325 every 3 hours as needed for Pain, Lyrica 20 mg/ml- 2 mL QID. Pt has tried patches in the past, does not tolerate. Unable to prescribe extended release medications due to patient having to crush medications in setting of dysphagia. May need to increase dose to  mg in the future if she declines. Maintain f/u with Rheum, Dr. Jamie Madera at [facility]    Pt is tolerating current pain meds without adverse effects or over sedation. Lowest effective dose used. Pt advised to call and notify palliative care for any adverse effects or sedation  Pt is able to maintain adequate functional level and participate in ADLs  [place]ARRS reviewed.  There is no indication of aberrant behavior  Pt advised to call for increasing or uncontrolled pain. Risk vs benefit assessed. Pt educated on risk of addiction. Pt advised to take only as prescribed and not to change frequency of pain meds without consulting palliative care first.    6. Dysphagia, unspecified type  Denies this visit. Will CTM  Continue diet as tollerated. Small, frequent meals. Maximize calories and protein  Daily nutritional shakes    7. Abnormality of gait  8. Long term (current) use of systemic steroids  - stable disease process    9. Edema, unspecified type  10. Chronic congestive heart failure, unspecified heart failure type ([facility]  Baseline     -Continue diuretic as prescribed  -Daily weights, call if you gain 3 lbs in one day or 5 lbs. in one week, or for increased edema, sob, cough, orthopnea, or chest pain  -Elevate BLE while in dependent position  -Avoid added salt; reviewed basic skin care.  -Compression socks on during day, off at night  -Maintain follow up with cardiology- Dr. Cinda Shane    11. Depression  - Recently started on Mirtazapine 7.5 mg PO qHS by PCP Dr. Wagner Harrison. Started 4 days ago. Will CTM    12.Encounter for palliative care  - Call for any questions, concerns or change in condition. Much support, guidance and active listening provided. Medications Discontinued During This Encounter   Medication Reason    oxyCODONE-acetaminophen (PERCOCET) 7.5-325 MG per tablet REORDER         Due to acuity, symptomatology and high-risk medication management, I advised patient to Return in about 18 weeks (around 10/14/2022) for palliative care visit.  Due to scheduling availability within the department      Total Time 40 mins           ROBIN Lou - CNP    Collaborating physician: Dr. Yanna Romo

## 2022-06-27 DIAGNOSIS — F32.0 CURRENT MILD EPISODE OF MAJOR DEPRESSIVE DISORDER WITHOUT PRIOR EPISODE (HCC): ICD-10-CM

## 2022-06-28 RX ORDER — MIRTAZAPINE 7.5 MG/1
7.5 TABLET, FILM COATED ORAL NIGHTLY
Qty: 30 TABLET | Refills: 2 | OUTPATIENT
Start: 2022-06-28

## 2022-06-28 NOTE — TELEPHONE ENCOUNTER
Patient is requesting medication refill.  Please approve or deny this request.    Rx requested:  Requested Prescriptions     Pending Prescriptions Disp Refills    mirtazapine (REMERON) 7.5 MG tablet 30 tablet 2     Sig: Take 1 tablet by mouth nightly         Last Office Visit:   6/6/2022      Next Visit Date:  Future Appointments   Date Time Provider Eden Gary   8/30/2022  3:00 PM Domitila Jacinto MD [address] [place] [address]   10/10/2022 11:00 AM Munira Knapp[address]

## 2022-06-29 DIAGNOSIS — F32.0 CURRENT MILD EPISODE OF MAJOR DEPRESSIVE DISORDER WITHOUT PRIOR EPISODE (HCC): ICD-10-CM

## 2022-06-29 RX ORDER — MIRTAZAPINE 7.5 MG/1
7.5 TABLET, FILM COATED ORAL NIGHTLY
Qty: 30 TABLET | Refills: 1 | Status: SHIPPED | OUTPATIENT
Start: 2022-06-29 | End: 2022-08-03

## 2022-06-30 NOTE — TELEPHONE ENCOUNTER
1st attempt to reach patient. Called patient @ [phone] and left message on machine for patient to return call during normal business hours of 8:30 AM and 5 PM @ [phone] option 2.

## 2022-07-06 DIAGNOSIS — M81.0 AGE-RELATED OSTEOPOROSIS WITHOUT CURRENT PATHOLOGICAL FRACTURE: ICD-10-CM

## 2022-07-06 DIAGNOSIS — Z51.5 ENCOUNTER FOR PALLIATIVE CARE: ICD-10-CM

## 2022-07-06 DIAGNOSIS — M05.79 RHEU ARTHRITIS W RHEU FACTOR MULT SITE W/O ORG/SYS INVOLV (HCC): ICD-10-CM

## 2022-07-06 DIAGNOSIS — G89.29 OTHER CHRONIC PAIN: ICD-10-CM

## 2022-07-06 RX ORDER — OXYCODONE AND ACETAMINOPHEN 10; 325 MG/1; MG/1
1 TABLET ORAL EVERY 4 HOURS PRN
Qty: 24 TABLET | Refills: 0 | Status: SHIPPED | OUTPATIENT
Start: 2022-07-08 | End: 2022-07-12

## 2022-07-06 RX ORDER — OXYCODONE AND ACETAMINOPHEN 7.5; 325 MG/1; MG/1
1 TABLET ORAL
Qty: 240 TABLET | Refills: 0 | Status: CANCELLED | OUTPATIENT
Start: 2022-07-06 | End: 2022-08-05

## 2022-07-06 NOTE — PROGRESS NOTES
Discussed exacerbation of pain with collaborating physician, Dr. Heidi Castano. Patient unable to tolerate patches. Cannot tolerate ER because patient has to crush medications in setting of dysphagia. Will increase dose of Percocet from 7.5-325 mg to  mg from q 3 hours to q 4 hours. Patient and spouse agreeable to plan. Will place order for 4 days, starting Friday July 8, with plan for patient to contact office Monday and let nurse know if symptoms improve. Will follow up Monday. Instructed for patient to stop taking percocet 7.5-325 mg starting Friday July 8.     ROBIN Galdamez - CNP

## 2022-07-07 ENCOUNTER — TELEPHONE (OUTPATIENT)
Dept: PALLATIVE CARE | Age: 67
End: 2022-07-07

## 2022-07-07 DIAGNOSIS — M05.79 RHEU ARTHRITIS W RHEU FACTOR MULT SITE W/O ORG/SYS INVOLV (HCC): Primary | ICD-10-CM

## 2022-07-07 DIAGNOSIS — G89.29 OTHER CHRONIC PAIN: ICD-10-CM

## 2022-07-07 RX ORDER — ONDANSETRON 4 MG/1
4 TABLET, FILM COATED ORAL EVERY 6 HOURS PRN
Qty: 30 TABLET | Refills: 0 | Status: SHIPPED | OUTPATIENT
Start: 2022-07-07

## 2022-07-07 RX ORDER — OXYCODONE AND ACETAMINOPHEN 10; 325 MG/1; MG/1
1 TABLET ORAL EVERY 4 HOURS PRN
Qty: 42 TABLET | Refills: 0 | Status: SHIPPED | OUTPATIENT
Start: 2022-07-08 | End: 2022-07-13 | Stop reason: SDUPTHER

## 2022-07-07 NOTE — TELEPHONE ENCOUNTER
A previous doctor prescribed patient Zofran and that doctor is no longer there anymore. Are you able to prescribe Ellamae Memory? Her previous order was 4 mg every 4-6 hours PRN.

## 2022-07-07 NOTE — TELEPHONE ENCOUNTER
Shane Cantu called [employer]. The medication needs to be ordered for atleast 7 days for insurance to cover it and for the rest of the month. If it is only ordered for 4 days, then say it works and ordered again for the rest of the month it will cost them a \"few hundred dollars\". Let me know if you are able to change it to 7 days or what the plan will be, thanks !

## 2022-07-08 ENCOUNTER — HOSPITAL ENCOUNTER (OUTPATIENT)
Dept: LAB | Age: 67
Discharge: HOME | End: 2022-07-08
Payer: MEDICARE

## 2022-07-08 DIAGNOSIS — Z00.00 INITIAL MEDICARE ANNUAL WELLNESS VISIT: ICD-10-CM

## 2022-07-08 LAB
ALBUMIN SERPL-MCNC: 3.4 G/DL (ref 3.5–4.6)
ALP BLD-CCNC: 76 U/L (ref 40–130)
ALT SERPL-CCNC: 12 U/L (ref 0–33)
ANION GAP SERPL CALCULATED.3IONS-SCNC: 18 MEQ/L (ref 9–15)
AST SERPL-CCNC: 26 U/L (ref 0–35)
BASOPHILS ABSOLUTE: 0.1 K/UL (ref 0–0.2)
BASOPHILS RELATIVE PERCENT: 1.2 %
BILIRUB SERPL-MCNC: 0.3 MG/DL (ref 0.2–0.7)
BUN BLDV-MCNC: 10 MG/DL (ref 8–23)
CALCIUM SERPL-MCNC: 8.8 MG/DL (ref 8.5–9.9)
CHLORIDE BLD-SCNC: 99 MEQ/L (ref 95–107)
CO2: 19 MEQ/L (ref 20–31)
CREAT SERPL-MCNC: 0.38 MG/DL (ref 0.5–0.9)
EOSINOPHILS ABSOLUTE: 0.1 K/UL (ref 0–0.7)
EOSINOPHILS RELATIVE PERCENT: 1.1 %
GFR AFRICAN AMERICAN: >60
GFR NON-AFRICAN AMERICAN: >60
GLOBULIN: 3.1 G/DL (ref 2.3–3.5)
GLUCOSE BLD-MCNC: 118 MG/DL (ref 70–99)
HCT VFR BLD CALC: 33.4 % (ref 37–47)
HEMOGLOBIN: 10.4 G/DL (ref 12–16)
LYMPHOCYTES ABSOLUTE: 1 K/UL (ref 1–4.8)
LYMPHOCYTES RELATIVE PERCENT: 14.1 %
MCH RBC QN AUTO: 26.5 PG (ref 27–31.3)
MCHC RBC AUTO-ENTMCNC: 31.1 % (ref 33–37)
MCV RBC AUTO: 85.1 FL (ref 82–100)
MONOCYTES ABSOLUTE: 0.6 K/UL (ref 0.2–0.8)
MONOCYTES RELATIVE PERCENT: 8.2 %
NEUTROPHILS ABSOLUTE: 5.5 K/UL (ref 1.4–6.5)
NEUTROPHILS RELATIVE PERCENT: 75.4 %
PDW BLD-RTO: 18.1 % (ref 11.5–14.5)
PLATELET # BLD: 361 K/UL (ref 130–400)
POTASSIUM SERPL-SCNC: 4.5 MEQ/L (ref 3.4–4.9)
RBC # BLD: 3.92 M/UL (ref 4.2–5.4)
SODIUM BLD-SCNC: 136 MEQ/L (ref 135–144)
TOTAL PROTEIN: 6.5 G/DL (ref 6.3–8)
WBC # BLD: 7.2 K/UL (ref 4.8–10.8)

## 2022-07-08 PROCEDURE — 85025 COMPLETE CBC W/AUTO DIFF WBC: CPT

## 2022-07-08 PROCEDURE — 36415 COLL VENOUS BLD VENIPUNCTURE: CPT

## 2022-07-08 PROCEDURE — 80053 COMPREHEN METABOLIC PANEL: CPT

## 2022-07-13 DIAGNOSIS — G89.29 OTHER CHRONIC PAIN: ICD-10-CM

## 2022-07-13 DIAGNOSIS — M05.79 RHEU ARTHRITIS W RHEU FACTOR MULT SITE W/O ORG/SYS INVOLV (HCC): ICD-10-CM

## 2022-07-13 RX ORDER — OXYCODONE AND ACETAMINOPHEN 10; 325 MG/1; MG/1
1 TABLET ORAL EVERY 4 HOURS PRN
Qty: 180 TABLET | Refills: 0 | Status: SHIPPED | OUTPATIENT
Start: 2022-07-15 | End: 2022-08-10 | Stop reason: SDUPTHER

## 2022-07-13 NOTE — PROGRESS NOTES
Alesha Ford called in to explain that this new pain regimen has really helped with Lizbet Bowels. It took a few days, but now her pain is down to about 3-4/10 and her mood has improved. Please prescribe the 30 day supply, thank you. Rx requested:  Requested Prescriptions     Pending Prescriptions Disp Refills    oxyCODONE-acetaminophen (PERCOCET)  MG per tablet 42 tablet 0     Sig: Take 1 tablet by mouth every 4 hours as needed for Pain for up to 7 days.        Last Office Visit:   Visit date not found      Last filled:  7/8/22    Next Visit Date:  Future Appointments   Date Time Provider Eden Gary   8/30/2022  3:00 PM Joseph Ortiz  [facility]   10/10/2022 11:00 AM Audie Delgado, [address] [place] [address]

## 2022-07-21 ENCOUNTER — PATIENT MESSAGE (OUTPATIENT)
Dept: FAMILY MEDICINE CLINIC | Age: 67
End: 2022-07-21

## 2022-07-22 NOTE — TELEPHONE ENCOUNTER
From: Ricky Magallanes  To: Dr. Price Sa: 7/21/2022 7:39 PM EDT  Subject: Lab Test Results    Your staff has been trying to reach me by phone several times to discuss my results. I have not been able to reach the phone in time due to my multiple problems. I have tried to call your office with no luck. Please have your staff use my husbands phone number :    [phone]    to speak with him about the results.   Thank you,    Tr Julian

## 2022-08-03 RX ORDER — SERTRALINE HYDROCHLORIDE 25 MG/1
TABLET, FILM COATED ORAL
Qty: 30 TABLET | Refills: 0 | Status: SHIPPED | OUTPATIENT
Start: 2022-08-03 | End: 2022-09-02 | Stop reason: SDUPTHER

## 2022-08-09 DIAGNOSIS — G89.29 OTHER CHRONIC PAIN: ICD-10-CM

## 2022-08-09 DIAGNOSIS — M05.79 RHEU ARTHRITIS W RHEU FACTOR MULT SITE W/O ORG/SYS INVOLV (HCC): ICD-10-CM

## 2022-08-09 DIAGNOSIS — M81.0 AGE-RELATED OSTEOPOROSIS WITHOUT CURRENT PATHOLOGICAL FRACTURE: ICD-10-CM

## 2022-08-09 DIAGNOSIS — K59.9 INTESTINAL MOTILITY DISORDER: Primary | ICD-10-CM

## 2022-08-09 DIAGNOSIS — Z51.5 ENCOUNTER FOR PALLIATIVE CARE: ICD-10-CM

## 2022-08-09 DIAGNOSIS — M79.7 FIBROMYALGIA, SECONDARY: ICD-10-CM

## 2022-08-10 RX ORDER — OXYCODONE AND ACETAMINOPHEN 10; 325 MG/1; MG/1
1 TABLET ORAL EVERY 4 HOURS PRN
Qty: 180 TABLET | Refills: 0 | Status: SHIPPED | OUTPATIENT
Start: 2022-08-13 | End: 2022-09-07 | Stop reason: SDUPTHER

## 2022-08-10 RX ORDER — PREGABALIN 20 MG/ML
2 SOLUTION ORAL 4 TIMES DAILY
Qty: 240 ML | Refills: 2 | Status: CANCELLED | OUTPATIENT
Start: 2022-08-10 | End: 2022-11-08

## 2022-08-10 RX ORDER — METOCLOPRAMIDE 10 MG/1
5 TABLET ORAL
Qty: 120 TABLET | Refills: 3 | Status: SHIPPED | OUTPATIENT
Start: 2022-08-10 | End: 2022-08-30 | Stop reason: SDUPTHER

## 2022-08-19 DIAGNOSIS — M05.79 RHEU ARTHRITIS W RHEU FACTOR MULT SITE W/O ORG/SYS INVOLV (HCC): ICD-10-CM

## 2022-08-19 DIAGNOSIS — M81.0 AGE-RELATED OSTEOPOROSIS WITHOUT CURRENT PATHOLOGICAL FRACTURE: ICD-10-CM

## 2022-08-19 DIAGNOSIS — Z51.5 ENCOUNTER FOR PALLIATIVE CARE: ICD-10-CM

## 2022-08-19 DIAGNOSIS — G89.29 OTHER CHRONIC PAIN: ICD-10-CM

## 2022-08-19 DIAGNOSIS — M79.7 FIBROMYALGIA, SECONDARY: ICD-10-CM

## 2022-08-19 RX ORDER — PREGABALIN 20 MG/ML
2 SOLUTION ORAL 4 TIMES DAILY
Qty: 240 ML | Refills: 2 | Status: SHIPPED | OUTPATIENT
Start: 2022-08-19 | End: 2022-10-21 | Stop reason: SDUPTHER

## 2022-08-30 ENCOUNTER — OFFICE VISIT (OUTPATIENT)
Dept: CARDIOLOGY CLINIC | Age: 67
End: 2022-08-30
Payer: MEDICARE

## 2022-08-30 VITALS
OXYGEN SATURATION: 95 % | HEART RATE: 63 BPM | DIASTOLIC BLOOD PRESSURE: 62 MMHG | SYSTOLIC BLOOD PRESSURE: 108 MMHG | BODY MASS INDEX: 15.06 KG/M2 | WEIGHT: 85 LBS

## 2022-08-30 DIAGNOSIS — I10 ESSENTIAL HYPERTENSION: Primary | ICD-10-CM

## 2022-08-30 DIAGNOSIS — K59.9 INTESTINAL MOTILITY DISORDER: ICD-10-CM

## 2022-08-30 DIAGNOSIS — I50.9 ACUTE CONGESTIVE HEART FAILURE, UNSPECIFIED HEART FAILURE TYPE (HCC): ICD-10-CM

## 2022-08-30 PROCEDURE — G8419 CALC BMI OUT NRM PARAM NOF/U: HCPCS | Performed by: INTERNAL MEDICINE

## 2022-08-30 PROCEDURE — G8427 DOCREV CUR MEDS BY ELIG CLIN: HCPCS | Performed by: INTERNAL MEDICINE

## 2022-08-30 PROCEDURE — 1036F TOBACCO NON-USER: CPT | Performed by: INTERNAL MEDICINE

## 2022-08-30 PROCEDURE — 3017F COLORECTAL CA SCREEN DOC REV: CPT | Performed by: INTERNAL MEDICINE

## 2022-08-30 PROCEDURE — 93000 ELECTROCARDIOGRAM COMPLETE: CPT | Performed by: INTERNAL MEDICINE

## 2022-08-30 PROCEDURE — 1123F ACP DISCUSS/DSCN MKR DOCD: CPT | Performed by: INTERNAL MEDICINE

## 2022-08-30 PROCEDURE — G8400 PT W/DXA NO RESULTS DOC: HCPCS | Performed by: INTERNAL MEDICINE

## 2022-08-30 PROCEDURE — 1090F PRES/ABSN URINE INCON ASSESS: CPT | Performed by: INTERNAL MEDICINE

## 2022-08-30 PROCEDURE — 99214 OFFICE O/P EST MOD 30 MIN: CPT | Performed by: INTERNAL MEDICINE

## 2022-08-30 RX ORDER — MIDODRINE HYDROCHLORIDE 2.5 MG/1
2.5 TABLET ORAL 3 TIMES DAILY
Qty: 270 TABLET | Refills: 3 | Status: SHIPPED | OUTPATIENT
Start: 2022-08-30

## 2022-08-30 RX ORDER — METOCLOPRAMIDE 10 MG/1
5 TABLET ORAL
Qty: 270 TABLET | Refills: 3 | Status: SHIPPED | OUTPATIENT
Start: 2022-08-30

## 2022-08-30 RX ORDER — NALOXONE HYDROCHLORIDE 4 MG/.1ML
SPRAY NASAL
COMMUNITY
Start: 2022-03-19

## 2022-08-30 ASSESSMENT — ENCOUNTER SYMPTOMS
STRIDOR: 0
COUGH: 0
GASTROINTESTINAL NEGATIVE: 1
EYES NEGATIVE: 1
RESPIRATORY NEGATIVE: 1
NAUSEA: 0
WHEEZING: 0
CHEST TIGHTNESS: 0
SHORTNESS OF BREATH: 0
BLOOD IN STOOL: 0

## 2022-08-30 NOTE — PROGRESS NOTES
OFFICE VISIT        Patient: Chelsi Lara  YOB: 1955  MRN: [id number]    Chief Complaint: RA LE Edema. Chief Complaint   Patient presents with    Follow-up     4 month    Congestive Heart Failure    Hypertension       CV Data:  11/9/21 LE Vein No DVT   12/21 Echo EF 60 2+ TR RVSP 39     Subjective/HPI very frail lady with progressive LE edeam weakness and cachexia. She has severe RA. She is minimally active. She has no cp no sob not dizzy. Recent BNP elevated. She is mild Hyponatremic as well. Started low dose Lasix yesterdy. Edema has gone down some per pt and . 3/16/22 wants to stop Lasix. She is currnelty taking QOD or q3rd day. No cp no sob. Ambulated eating better. Very dizzy BP low. 4/26/22 feels much better on Midorine low dose. Not dizzy no spb no cp no falls no bleed. No prior CV history    8/30/22 doing well no new issues no cp no sob no falls no bleed. Chronic anemia. Labs reviewed. Nonsmoker  Lives w      EKG: SR 79    Past Medical History:   Diagnosis Date    CHF (congestive heart failure) ([address]     Fibromyalgia     Fracture of pelvis ([address]     left    History of left hip replacement     History of total left knee replacement     History of total right knee replacement     Light sensitivity     bilateral eye    Osteoporosis     Rheumatoid arthritis ([address]     Sjogrens syndrome ([address]        Past Surgical History:   Procedure Laterality Date    CATARACT REMOVAL WITH IMPLANT  01/2013    bilateral    CHOLECYSTECTOMY      OTHER SURGICAL HISTORY      bilateral eye implant    OVARY REMOVAL  06/2000    right    REVISION TOTAL HIP ARTHROPLASTY  10/2012    left    TOTAL HIP ARTHROPLASTY  04/1990    left    TOTAL KNEE ARTHROPLASTY  08/1997    right and left       No family history on file.     Social History     Socioeconomic History    Marital status:    Tobacco Use    Smoking status: Never    Smokeless tobacco: Never   Vaping Use    Vaping Use: Never (before breakfast) 30 tablet 11    etanercept (ENBREL) 50 MG/ML injection Inject into the skin once a week Takes monday      folic acid (FOLVITE) 1 MG tablet TAKE 1 TABLET BY MOUTH ONCE DAILY      predniSONE (DELTASONE) 5 MG tablet Take 5 mg by mouth daily      Calcium Carbonate-Vit D-Min (CALTRATE 600+D PLUS PO) Take by mouth daily      levothyroxine (SYNTHROID) 100 MCG tablet TAKE 1 TABLET BY MOUTH ONCE DAILY      Lifitegrast (XIIDRA) 5 % SOLN Apply 1 drop to eye 2 times daily      zoledronic acid (RECLAST) 5 MG/100ML SOLN Infuse 5 mg intravenously once Yearly      Multiple Vitamins-Minerals (CENTRUM ADULTS PO) Take by mouth      Carboxymethylcellul-Glycerin (REFRESH RELIEVA OP) Apply to eye       No current facility-administered medications for this visit. Review of Systems:   Review of Systems   Constitutional:  Positive for fatigue. Negative for diaphoresis. HENT: Negative. Eyes: Negative. Respiratory: Negative. Negative for cough, chest tightness, shortness of breath, wheezing and stridor. Cardiovascular: Negative. Negative for chest pain, palpitations and leg swelling. Gastrointestinal: Negative. Negative for blood in stool and nausea. Genitourinary: Negative. Musculoskeletal:  Positive for arthralgias and gait problem. Skin: Negative. Neurological:  Positive for weakness. Negative for dizziness, syncope and light-headedness. Hematological: Negative. Psychiatric/Behavioral: Negative. Physical Examination:    /62 (Site: Right Upper Arm, Position: Sitting, Cuff Size: Medium Adult)   Pulse 63   Wt 85 lb (38.6 kg)   SpO2 95%   BMI 15.06 kg/m²    Physical Exam   Constitutional: She appears cachectic. No distress. HENT:   Normal cephalic and Atraumatic   Eyes: Pupils are equal, round, and reactive to light. Neck: Thyroid normal. No JVD present. No neck adenopathy. No thyromegaly present.    Cardiovascular: Normal rate, regular rhythm, normal heart sounds, intact distal pulses and normal pulses. Pulmonary/Chest: Effort normal and breath sounds normal. She has no wheezes. She has no rales. She exhibits no tenderness. Abdominal: Soft. Bowel sounds are normal. There is no abdominal tenderness. Musculoskeletal:         General: No tenderness or edema. Normal range of motion. Cervical back: Normal range of motion and neck supple. Neurological: She is alert and oriented to person, place, and time. Skin: Skin is warm. No cyanosis. Nails show no clubbing.      LABS:  CBC:   Lab Results   Component Value Date/Time    WBC 7.2 07/08/2022 12:29 PM    RBC 3.92 07/08/2022 12:29 PM    HGB 10.4 07/08/2022 12:29 PM    HCT 33.4 07/08/2022 12:29 PM    MCV 85.1 07/08/2022 12:29 PM    MCH 26.5 07/08/2022 12:29 PM    MCHC 31.1 07/08/2022 12:29 PM    RDW 18.1 07/08/2022 12:29 PM     07/08/2022 12:29 PM     Lipids:No results found for: CHOL  No results found for: TRIG  No results found for: HDL  No results found for: LDLCHOLESTEROL, LDLCALC  No results found for: LABVLDL, VLDL  No results found for: CHOLHDLRATIO  CMP:    Lab Results   Component Value Date/Time     07/08/2022 12:29 PM    K 4.5 07/08/2022 12:29 PM    K 2.9 10/02/2021 06:35 AM    CL 99 07/08/2022 12:29 PM    CO2 19 07/08/2022 12:29 PM    BUN 10 07/08/2022 12:29 PM    CREATININE 0.38 07/08/2022 12:29 PM    GFRAA >60.0 07/08/2022 12:29 PM    LABGLOM >60.0 07/08/2022 12:29 PM    GLUCOSE 118 07/08/2022 12:29 PM    PROT 6.5 07/08/2022 12:29 PM    LABALBU 3.4 07/08/2022 12:29 PM    CALCIUM 8.8 07/08/2022 12:29 PM    BILITOT 0.3 07/08/2022 12:29 PM    ALKPHOS 76 07/08/2022 12:29 PM    AST 26 07/08/2022 12:29 PM    ALT 12 07/08/2022 12:29 PM     BMP:    Lab Results   Component Value Date/Time     07/08/2022 12:29 PM    K 4.5 07/08/2022 12:29 PM    K 2.9 10/02/2021 06:35 AM    CL 99 07/08/2022 12:29 PM    CO2 19 07/08/2022 12:29 PM    BUN 10 07/08/2022 12:29 PM    LABALBU 3.4 07/08/2022 12:29 PM CREATININE 0.38 07/08/2022 12:29 PM    CALCIUM 8.8 07/08/2022 12:29 PM    GFRAA >60.0 07/08/2022 12:29 PM    LABGLOM >60.0 07/08/2022 12:29 PM    GLUCOSE 118 07/08/2022 12:29 PM     Magnesium:    Lab Results   Component Value Date/Time    MG 1.8 10/02/2021 06:35 AM     TSH:  Lab Results   Component Value Date    TSH 2.810 12/03/2021             Patient Active Problem List   Diagnosis    Age-related osteoporosis without current pathological fracture    Anemia, unspecified    Hypothyroidism, unspecified    Rheu arthritis w rheu factor mult site w/o org/sys involv (HCC)    Chronic pain    Dry eye syndrome, bilateral    Secondary Sjogren's syndrome (HCC)    Steroid long-term use    Colitis    Generalized muscle ache    Long-term current use of opiate analgesic    Acquired deformity joint foot    Acquired deformity of hand    Long term (current) use of systemic steroids    Muscle weakness (generalized)    Abnormality of gait    Difficulty swallowing    Fibromyalgia, secondary       Medications Discontinued During This Encounter   Medication Reason    midodrine (PROAMATINE) 2.5 MG tablet REORDER    metoclopramide (REGLAN) 10 MG tablet REORDER       Modified Medications    Modified Medication Previous Medication    METOCLOPRAMIDE (REGLAN) 10 MG TABLET metoclopramide (REGLAN) 10 MG tablet       Take 0.5 tablets by mouth 3 times daily (with meals)    Take 0.5 tablets by mouth in the morning and 0.5 tablets at noon and 0.5 tablets in the evening. Take with meals.     MIDODRINE (PROAMATINE) 2.5 MG TABLET midodrine (PROAMATINE) 2.5 MG tablet       Take 1 tablet by mouth 3 times daily    Take 1 tablet by mouth 3 times daily       Orders Placed This Encounter   Medications    midodrine (PROAMATINE) 2.5 MG tablet     Sig: Take 1 tablet by mouth 3 times daily     Dispense:  270 tablet     Refill:  3    metoclopramide (REGLAN) 10 MG tablet     Sig: Take 0.5 tablets by mouth 3 times daily (with meals)     Dispense:  270 tablet Refill:  3       Assessment/Plan:    1. Acute congestive heart failure, unspecified heart failure type ([address]  OK to stop Lasix for now. If edema worsen then resume Laisx. 2. Essential hypertension     - Echo 2D w doppler w color complete; Future - stable. 3. Need to eat more. 4. Dizzy low BP - add Low dose Midodrine. Counseling:  Heart Healthy Lifestyle, Improve BMI, Low Salt Diet, Increase Nutritional Intake and Walk Daily    Return in about 4 months (around 12/30/2022).     Electronically signed by Bruno Schmidt MD on 8/30/2022 at 3:51 PM

## 2022-09-02 ENCOUNTER — OFFICE VISIT (OUTPATIENT)
Dept: FAMILY MEDICINE CLINIC | Age: 67
End: 2022-09-02
Payer: MEDICARE

## 2022-09-02 ENCOUNTER — SCHEDULED TELEPHONE ENCOUNTER (OUTPATIENT)
Dept: PALLATIVE CARE | Age: 67
End: 2022-09-02
Payer: MEDICARE

## 2022-09-02 VITALS
OXYGEN SATURATION: 97 % | TEMPERATURE: 98.3 F | WEIGHT: 85 LBS | HEIGHT: 63 IN | HEART RATE: 76 BPM | DIASTOLIC BLOOD PRESSURE: 58 MMHG | SYSTOLIC BLOOD PRESSURE: 94 MMHG | BODY MASS INDEX: 15.06 KG/M2

## 2022-09-02 DIAGNOSIS — I50.9 CHRONIC CONGESTIVE HEART FAILURE, UNSPECIFIED HEART FAILURE TYPE (HCC): ICD-10-CM

## 2022-09-02 DIAGNOSIS — G89.29 OTHER CHRONIC PAIN: Primary | ICD-10-CM

## 2022-09-02 DIAGNOSIS — M05.79 RHEU ARTHRITIS W RHEU FACTOR MULT SITE W/O ORG/SYS INVOLV (HCC): ICD-10-CM

## 2022-09-02 DIAGNOSIS — R13.10 DYSPHAGIA, UNSPECIFIED TYPE: ICD-10-CM

## 2022-09-02 DIAGNOSIS — M79.7 FIBROMYALGIA, SECONDARY: ICD-10-CM

## 2022-09-02 DIAGNOSIS — F32.0 CURRENT MILD EPISODE OF MAJOR DEPRESSIVE DISORDER WITHOUT PRIOR EPISODE (HCC): Primary | ICD-10-CM

## 2022-09-02 DIAGNOSIS — R60.9 EDEMA, UNSPECIFIED TYPE: ICD-10-CM

## 2022-09-02 DIAGNOSIS — M81.0 AGE-RELATED OSTEOPOROSIS WITHOUT CURRENT PATHOLOGICAL FRACTURE: ICD-10-CM

## 2022-09-02 DIAGNOSIS — Z51.5 PALLIATIVE CARE ENCOUNTER: ICD-10-CM

## 2022-09-02 DIAGNOSIS — F32.A DEPRESSION, UNSPECIFIED DEPRESSION TYPE: ICD-10-CM

## 2022-09-02 DIAGNOSIS — G62.9 NEUROPATHY: ICD-10-CM

## 2022-09-02 DIAGNOSIS — R26.9 ABNORMALITY OF GAIT: ICD-10-CM

## 2022-09-02 DIAGNOSIS — M24.50 CONTRACTURE OF JOINT OF MULTIPLE SITES: ICD-10-CM

## 2022-09-02 PROCEDURE — G8400 PT W/DXA NO RESULTS DOC: HCPCS | Performed by: FAMILY MEDICINE

## 2022-09-02 PROCEDURE — G8427 DOCREV CUR MEDS BY ELIG CLIN: HCPCS | Performed by: FAMILY MEDICINE

## 2022-09-02 PROCEDURE — 1123F ACP DISCUSS/DSCN MKR DOCD: CPT | Performed by: FAMILY MEDICINE

## 2022-09-02 PROCEDURE — 99213 OFFICE O/P EST LOW 20 MIN: CPT | Performed by: FAMILY MEDICINE

## 2022-09-02 PROCEDURE — 99443 PR PHYS/QHP TELEPHONE EVALUATION 21-30 MIN: CPT | Performed by: NURSE PRACTITIONER

## 2022-09-02 PROCEDURE — G8419 CALC BMI OUT NRM PARAM NOF/U: HCPCS | Performed by: FAMILY MEDICINE

## 2022-09-02 PROCEDURE — 3017F COLORECTAL CA SCREEN DOC REV: CPT | Performed by: FAMILY MEDICINE

## 2022-09-02 PROCEDURE — 1036F TOBACCO NON-USER: CPT | Performed by: FAMILY MEDICINE

## 2022-09-02 PROCEDURE — 1090F PRES/ABSN URINE INCON ASSESS: CPT | Performed by: FAMILY MEDICINE

## 2022-09-02 RX ORDER — SERTRALINE HYDROCHLORIDE 25 MG/1
25 TABLET, FILM COATED ORAL DAILY
Qty: 90 TABLET | Refills: 0 | Status: SHIPPED | OUTPATIENT
Start: 2022-09-02

## 2022-09-02 ASSESSMENT — ENCOUNTER SYMPTOMS
SHORTNESS OF BREATH: 0
BLOOD IN STOOL: 0
GASTROINTESTINAL NEGATIVE: 1
NAUSEA: 0
VOMITING: 0
ABDOMINAL PAIN: 0
COUGH: 0
COLOR CHANGE: 0
APNEA: 0
CONSTIPATION: 0
DIARRHEA: 0
NAUSEA: 0
WHEEZING: 0
SHORTNESS OF BREATH: 0
COUGH: 0
DIARRHEA: 0
CHEST TIGHTNESS: 0
CONSTIPATION: 0
ALLERGIC/IMMUNOLOGIC NEGATIVE: 1
ABDOMINAL PAIN: 0
BACK PAIN: 1

## 2022-09-02 NOTE — PROGRESS NOTES
Rheumatoid arthritis ([facility]     Sjogrens syndrome ([facility]      Past Surgical History:   Procedure Laterality Date    CATARACT REMOVAL WITH IMPLANT  01/2013    bilateral    CHOLECYSTECTOMY      OTHER SURGICAL HISTORY      bilateral eye implant    OVARY REMOVAL  06/2000    right    REVISION TOTAL HIP ARTHROPLASTY  10/2012    left    TOTAL HIP ARTHROPLASTY  04/1990    left    TOTAL KNEE ARTHROPLASTY  08/1997    right and left     Social History     Socioeconomic History    Marital status:      Spouse name: Not on file    Number of children: Not on file    Years of education: Not on file    Highest education level: Not on file   Occupational History    Not on file   Tobacco Use    Smoking status: Never    Smokeless tobacco: Never   Vaping Use    Vaping Use: Never used   Substance and Sexual Activity    Alcohol use: Never    Drug use: Never    Sexual activity: Not Currently   Other Topics Concern    Not on file   Social History Narrative    Not on file     Social Determinants of Health     Financial Resource Strain: Low Risk     Difficulty of Paying Living Expenses: Not hard at all   Food Insecurity: No Food Insecurity    Worried About Running Out of Food in the Last Year: Never true    Ran Out of Food in the Last Year: Never true   Transportation Needs: Not on file   Physical Activity: Inactive    Days of Exercise per Week: 0 days    Minutes of Exercise per Session: 0 min   Stress: Not on file   Social Connections: Not on file   Intimate Partner Violence: Not on file   Housing Stability: Not on file     History reviewed. No pertinent family history. Allergies   Allergen Reactions    Hydrocodone-Acetaminophen Other (See Comments)     Dizzy, Nausea.     Hydroxychloroquine Other (See Comments)     Nausea, stomach upset    Indomethacin Other (See Comments)     Dizziness, nausea, rash and vomiting    Amoxicillin Diarrhea and Nausea And Vomiting    Lactose Nausea And Vomiting    Other      \"Cheap metals\"  rash     Current dose used. Pt advised to call and notify palliative care for any adverse effects or sedation  Pt is able to maintain adequate functional level and participate in ADLs  OARRS reviewed. There is no indication of aberrant behavior  Pt advised to call for increasing or uncontrolled pain. Risk vs benefit assessed. Pt educated on risk of addiction. Pt advised to take only as prescribed and not to change frequency of pain meds without consulting palliative care first.    7. Dysphagia, unspecified type  Denies this visit. Will CTM  Continue diet as tollerated. Small, frequent meals. Maximize calories and protein  Daily nutritional shakes. Remeron d/c'd due to side   effects. 8. Abnormality of gait  Functional status at baseline. Up with a rollator. 9. Edema, unspecified type  10. Chronic congestive heart failure, unspecified heart failure type ([facility])  Baseline     -Continue diuretic as prescribed  -Daily weights, call if you gain 3 lbs in one day or 5 lbs. in one week, or for increased edema, sob, cough, orthopnea, or chest pain  -Elevate BLE while in dependent position  -Avoid added salt; reviewed basic skin care.  -Compression socks on during day, off at night  -Maintain follow up with cardiology- Dr. Seth Willis    11. Depression  Stable. Continue zoloft 25 mg po daily. 12. Palliative care encounter  Discussed symptom management related to chronic disease/condition. Provided emotional support and active listening. Patient understands and is agreeable to current plan. Due to acuity, symptomatology and high-risk medication management, I advised patient to Return in about 6 weeks (around 10/14/2022). Total Time 30 mins     Total time includes reviewing other provider notes, reviewing labs, reviewing history, medications, and allergies, counseling patient, performing medically appropriate evaluation and examination, and documenting in the medical record.      ROBIN Mercado - CNP    Collaborating

## 2022-09-02 NOTE — PROGRESS NOTES
Subjective:      Patient ID: Neal Edgar is a 75 y.o. female who presents today for:     Chief Complaint   Patient presents with    Follow-up    Depression     Feels her mood is better. Medication seems to be working well so far. Is taking a whole tab once a day. Has not noticed any side effects at this time. Health Maintenance     Patient refuses mammogram, colonoscopy, cologuard & FIT test at this time. HPI  Patient is a very pleasant 42-year-old female presents today with her . She began having tremors with Remeron approximately 2 months ago therefore it was weaned. Her mood became significantly worse and she was transitioned to Zoloft. She states that since being on the Zoloft that symptoms have improved. She finds that she cries less and is able to manage her chronic pain better. She has been on the medication for approximately 4 weeks. She is unsure at this time of whether she would like to increase the medication we will leave it at its current dose. She denies any suicidal or homicidal ideations  Past Medical History:   Diagnosis Date    CHF (congestive heart failure) ([address]     Fibromyalgia     Fracture of pelvis ([address]     left    History of left hip replacement     History of total left knee replacement     History of total right knee replacement     Light sensitivity     bilateral eye    Osteoporosis     Rheumatoid arthritis ([address]     Sjogrens syndrome ([address]      Past Surgical History:   Procedure Laterality Date    CATARACT REMOVAL WITH IMPLANT  01/2013    bilateral    CHOLECYSTECTOMY      OTHER SURGICAL HISTORY      bilateral eye implant    OVARY REMOVAL  06/2000    right    REVISION TOTAL HIP ARTHROPLASTY  10/2012    left    TOTAL HIP ARTHROPLASTY  04/1990    left    TOTAL KNEE ARTHROPLASTY  08/1997    right and left     History reviewed. No pertinent family history.   Social History     Socioeconomic History    Marital status:      Spouse name: Not on file Number of children: Not on file    Years of education: Not on file    Highest education level: Not on file   Occupational History    Not on file   Tobacco Use    Smoking status: Never    Smokeless tobacco: Never   Vaping Use    Vaping Use: Never used   Substance and Sexual Activity    Alcohol use: Never    Drug use: Never    Sexual activity: Not Currently   Other Topics Concern    Not on file   Social History Narrative    Not on file     Social Determinants of Health     Financial Resource Strain: Low Risk     Difficulty of Paying Living Expenses: Not hard at all   Food Insecurity: No Food Insecurity    Worried About Running Out of Food in the Last Year: Never true    Ran Out of Food in the Last Year: Never true   Transportation Needs: Not on file   Physical Activity: Inactive    Days of Exercise per Week: 0 days    Minutes of Exercise per Session: 0 min   Stress: Not on file   Social Connections: Not on file   Intimate Partner Violence: Not on file   Housing Stability: Not on file     Current Outpatient Medications on File Prior to Visit   Medication Sig Dispense Refill    naloxone 4 MG/0.1ML LIQD nasal spray ADMINISTER A SINGLE SPRAY IN ONE NOSTRIL UPON SIGNS OF OPIOID OVERDOSE. CALL 911. REPEAT AFTER 3 MINUTES IF NO RESPONSE.      midodrine (PROAMATINE) 2.5 MG tablet Take 1 tablet by mouth 3 times daily 270 tablet 3    metoclopramide (REGLAN) 10 MG tablet Take 0.5 tablets by mouth 3 times daily (with meals) 270 tablet 3    pregabalin (LYRICA) 20 MG/ML SOLN solution Take 2 mLs by mouth 4 times daily for 90 days. 240 mL 2    oxyCODONE-acetaminophen (PERCOCET)  MG per tablet Take 1 tablet by mouth every 4 hours as needed for Pain for up to 30 days.  180 tablet 0    ondansetron (ZOFRAN) 4 MG tablet Take 1 tablet by mouth every 6 hours as needed for Nausea or Vomiting 30 tablet 0    furosemide (LASIX) 20 MG tablet Take 0.5 tablets by mouth daily as needed (edema) 90 tablet 3    pantoprazole (PROTONIX) 40 MG tablet Take 1 tablet by mouth every morning (before breakfast) 30 tablet 11    etanercept (ENBREL) 50 MG/ML injection Inject into the skin once a week Takes monday      folic acid (FOLVITE) 1 MG tablet TAKE 1 TABLET BY MOUTH ONCE DAILY      predniSONE (DELTASONE) 5 MG tablet Take 5 mg by mouth daily      Calcium Carbonate-Vit D-Min (CALTRATE 600+D PLUS PO) Take by mouth daily      levothyroxine (SYNTHROID) 100 MCG tablet TAKE 1 TABLET BY MOUTH ONCE DAILY      Lifitegrast (XIIDRA) 5 % SOLN Place 1 drop into both eyes 2 times daily      zoledronic acid (RECLAST) 5 MG/100ML SOLN Infuse 5 mg intravenously once Yearly      Multiple Vitamins-Minerals (CENTRUM ADULTS PO) Take by mouth daily      Carboxymethylcellul-Glycerin (REFRESH RELIEVA OP) Apply to eye as needed       No current facility-administered medications on file prior to visit. Allergies:  Hydrocodone-acetaminophen, Hydroxychloroquine, Indomethacin, Amoxicillin, Lactose, and Other    Review of Systems   Constitutional:  Negative for activity change, appetite change and fatigue. Respiratory:  Negative for apnea, cough, chest tightness and shortness of breath. Cardiovascular:  Negative for chest pain, palpitations and leg swelling. Gastrointestinal:  Negative for abdominal pain, blood in stool, constipation, diarrhea, nausea and vomiting. Musculoskeletal:  Negative for arthralgias. Neurological:  Negative for seizures and headaches. Psychiatric/Behavioral:  Negative for hallucinations and suicidal ideas. Objective:   BP (!) 94/58   Pulse 76   Temp 98.3 °F (36.8 °C)   Ht 5' 3\" (1.6 m)   Wt 85 lb (38.6 kg)   SpO2 97%   BMI 15.06 kg/m²     Physical Exam  Vitals and nursing note reviewed. Constitutional:       General: She is not in acute distress. Appearance: Normal appearance. She is well-developed. She is not diaphoretic. HENT:      Head: Normocephalic and atraumatic.       Nose: Nose normal.      Mouth/Throat:      Mouth: Mucous membranes are moist.      Pharynx: Oropharynx is clear. Eyes:      Conjunctiva/sclera: Conjunctivae normal.      Pupils: Pupils are equal, round, and reactive to light. Cardiovascular:      Rate and Rhythm: Normal rate and regular rhythm. Heart sounds: Normal heart sounds. No murmur heard. No friction rub. No gallop. Pulmonary:      Effort: Pulmonary effort is normal. No respiratory distress. Breath sounds: Normal breath sounds. No wheezing or rales. Chest:      Chest wall: No tenderness. Abdominal:      General: Abdomen is flat. Bowel sounds are normal.      Palpations: Abdomen is soft. Tenderness: There is no abdominal tenderness. Musculoskeletal:      Cervical back: Normal range of motion. Skin:     General: Skin is warm and dry. Neurological:      Mental Status: She is alert and oriented to person, place, and time. Psychiatric:         Behavior: Behavior normal.         Thought Content: Thought content normal.         Judgment: Judgment normal.       Assessment & Plan:     1. Current mild episode of major depressive disorder without prior episode (HCC)  Improved: Continue current dose. We will explore potentially increasing dose in another 2 weeks. .  Patient will inform our office as to her decision  - sertraline (ZOLOFT) 25 MG tablet; Take 1 tablet by mouth daily Take 1/2 tablet PO Qdaily x 1 week, then 1 tab Po Qdaily  Dispense: 90 tablet; Refill: 0    Return in about 3 months (around 12/2/2022).     Ayde Charles MD

## 2022-09-07 DIAGNOSIS — G89.29 OTHER CHRONIC PAIN: ICD-10-CM

## 2022-09-07 DIAGNOSIS — M05.79 RHEU ARTHRITIS W RHEU FACTOR MULT SITE W/O ORG/SYS INVOLV (HCC): ICD-10-CM

## 2022-09-07 RX ORDER — OXYCODONE AND ACETAMINOPHEN 10; 325 MG/1; MG/1
1 TABLET ORAL EVERY 4 HOURS PRN
Qty: 180 TABLET | Refills: 0 | Status: SHIPPED | OUTPATIENT
Start: 2022-09-11 | End: 2022-10-06 | Stop reason: SDUPTHER

## 2022-09-07 NOTE — PROGRESS NOTES
Rx requested:  Requested Prescriptions     Pending Prescriptions Disp Refills    oxyCODONE-acetaminophen (PERCOCET)  MG per tablet 180 tablet 0     Sig: Take 1 tablet by mouth every 4 hours as needed for Pain for up to 30 days.        Last Office Visit:   Visit date not found      Last filled:  8/13/22    Next Visit Date:  Future Appointments   Date Time Provider Eden Gary   10/14/2022  3:00 PM ROBIN Kimble - CNP [facility]   12/19/2022  3:30 PM Paris Beck MD [facility]

## 2022-09-09 PROBLEM — M24.50 CONTRACTURE OF JOINT OF MULTIPLE SITES: Status: ACTIVE | Noted: 2022-09-09

## 2022-09-09 PROBLEM — Z51.5 PALLIATIVE CARE ENCOUNTER: Status: ACTIVE | Noted: 2022-09-09

## 2022-09-09 PROBLEM — I50.9 CHRONIC CONGESTIVE HEART FAILURE (HCC): Status: ACTIVE | Noted: 2022-09-09

## 2022-09-09 PROBLEM — F32.A DEPRESSION: Status: ACTIVE | Noted: 2022-09-09

## 2022-09-09 PROBLEM — R60.9 EDEMA: Status: ACTIVE | Noted: 2022-09-09

## 2022-09-09 PROBLEM — G62.9 NEUROPATHY: Status: ACTIVE | Noted: 2022-09-09

## 2022-09-09 ASSESSMENT — ENCOUNTER SYMPTOMS: TROUBLE SWALLOWING: 1

## 2022-10-06 DIAGNOSIS — M05.79 RHEU ARTHRITIS W RHEU FACTOR MULT SITE W/O ORG/SYS INVOLV (HCC): ICD-10-CM

## 2022-10-06 DIAGNOSIS — G89.29 OTHER CHRONIC PAIN: ICD-10-CM

## 2022-10-06 RX ORDER — OXYCODONE AND ACETAMINOPHEN 10; 325 MG/1; MG/1
1 TABLET ORAL EVERY 4 HOURS PRN
Qty: 180 TABLET | Refills: 0 | Status: SHIPPED | OUTPATIENT
Start: 2022-10-11 | End: 2022-11-10

## 2022-10-06 NOTE — PROGRESS NOTES
Rx requested:  Requested Prescriptions     Pending Prescriptions Disp Refills    oxyCODONE-acetaminophen (PERCOCET)  MG per tablet 180 tablet 0     Sig: Take 1 tablet by mouth every 4 hours as needed for Pain for up to 30 days.        Last Office Visit:   Visit date not found      Last filled:  9/11/22    Next Visit Date:  Future Appointments   Date Time Provider Eden Gary   10/14/2022  3:00 PM ROBIN Wright - CNP [facility]   12/19/2022  3:30 PM Humaira Mcgee MD [facility]

## 2022-10-14 ENCOUNTER — OFFICE VISIT (OUTPATIENT)
Dept: PALLATIVE CARE | Age: 67
End: 2022-10-14
Payer: MEDICARE

## 2022-10-14 VITALS
SYSTOLIC BLOOD PRESSURE: 121 MMHG | OXYGEN SATURATION: 93 % | TEMPERATURE: 98.8 F | DIASTOLIC BLOOD PRESSURE: 84 MMHG | HEART RATE: 80 BPM

## 2022-10-14 DIAGNOSIS — M05.79 RHEU ARTHRITIS W RHEU FACTOR MULT SITE W/O ORG/SYS INVOLV (HCC): ICD-10-CM

## 2022-10-14 DIAGNOSIS — R52 DISTURBANCE OF SLEEP PATTERN ASSOCIATED WITH PAIN: ICD-10-CM

## 2022-10-14 DIAGNOSIS — R26.9 ABNORMALITY OF GAIT: ICD-10-CM

## 2022-10-14 DIAGNOSIS — R25.2 SPASTICITY: ICD-10-CM

## 2022-10-14 DIAGNOSIS — M79.7 FIBROMYALGIA, SECONDARY: ICD-10-CM

## 2022-10-14 DIAGNOSIS — F32.A DEPRESSION, UNSPECIFIED DEPRESSION TYPE: ICD-10-CM

## 2022-10-14 DIAGNOSIS — R63.6 UNDERWEIGHT: ICD-10-CM

## 2022-10-14 DIAGNOSIS — M79.2 NEUROPATHIC PAIN: ICD-10-CM

## 2022-10-14 DIAGNOSIS — R13.10 DYSPHAGIA, UNSPECIFIED TYPE: ICD-10-CM

## 2022-10-14 DIAGNOSIS — M24.50 CONTRACTURE OF JOINT OF MULTIPLE SITES: ICD-10-CM

## 2022-10-14 DIAGNOSIS — M81.0 AGE-RELATED OSTEOPOROSIS WITHOUT CURRENT PATHOLOGICAL FRACTURE: Primary | ICD-10-CM

## 2022-10-14 DIAGNOSIS — I50.9 CHRONIC CONGESTIVE HEART FAILURE, UNSPECIFIED HEART FAILURE TYPE (HCC): ICD-10-CM

## 2022-10-14 DIAGNOSIS — Z51.5 PALLIATIVE CARE ENCOUNTER: ICD-10-CM

## 2022-10-14 DIAGNOSIS — R11.0 NAUSEA: ICD-10-CM

## 2022-10-14 DIAGNOSIS — G47.9 DISTURBANCE OF SLEEP PATTERN ASSOCIATED WITH PAIN: ICD-10-CM

## 2022-10-14 DIAGNOSIS — G89.29 OTHER CHRONIC PAIN: ICD-10-CM

## 2022-10-14 DIAGNOSIS — R60.9 EDEMA, UNSPECIFIED TYPE: ICD-10-CM

## 2022-10-14 PROBLEM — K90.9 INTESTINAL MALABSORPTION, UNSPECIFIED: Status: ACTIVE | Noted: 2021-09-07

## 2022-10-14 PROBLEM — M35.01 KERATITIS SICCA (HCC): Status: ACTIVE | Noted: 2020-07-23

## 2022-10-14 PROCEDURE — 1036F TOBACCO NON-USER: CPT | Performed by: NURSE PRACTITIONER

## 2022-10-14 PROCEDURE — 1090F PRES/ABSN URINE INCON ASSESS: CPT | Performed by: NURSE PRACTITIONER

## 2022-10-14 PROCEDURE — G8419 CALC BMI OUT NRM PARAM NOF/U: HCPCS | Performed by: NURSE PRACTITIONER

## 2022-10-14 PROCEDURE — 3017F COLORECTAL CA SCREEN DOC REV: CPT | Performed by: NURSE PRACTITIONER

## 2022-10-14 PROCEDURE — 99349 HOME/RES VST EST MOD MDM 40: CPT | Performed by: NURSE PRACTITIONER

## 2022-10-14 PROCEDURE — 1123F ACP DISCUSS/DSCN MKR DOCD: CPT | Performed by: NURSE PRACTITIONER

## 2022-10-14 PROCEDURE — G8484 FLU IMMUNIZE NO ADMIN: HCPCS | Performed by: NURSE PRACTITIONER

## 2022-10-14 RX ORDER — ONDANSETRON 4 MG/1
4 TABLET, ORALLY DISINTEGRATING ORAL EVERY 8 HOURS PRN
Qty: 30 TABLET | Refills: 3 | Status: SHIPPED | OUTPATIENT
Start: 2022-10-14

## 2022-10-14 ASSESSMENT — PATIENT HEALTH QUESTIONNAIRE - PHQ9
SUM OF ALL RESPONSES TO PHQ QUESTIONS 1-9: 1
2. FEELING DOWN, DEPRESSED OR HOPELESS: 1
1. LITTLE INTEREST OR PLEASURE IN DOING THINGS: 0
SUM OF ALL RESPONSES TO PHQ9 QUESTIONS 1 & 2: 1
SUM OF ALL RESPONSES TO PHQ QUESTIONS 1-9: 1

## 2022-10-14 ASSESSMENT — ENCOUNTER SYMPTOMS
BACK PAIN: 1
ALLERGIC/IMMUNOLOGIC NEGATIVE: 1
CONSTIPATION: 0
ABDOMINAL PAIN: 0
COUGH: 0
DIARRHEA: 0
COLOR CHANGE: 0
GASTROINTESTINAL NEGATIVE: 1
TROUBLE SWALLOWING: 1
WHEEZING: 0
SHORTNESS OF BREATH: 0
NAUSEA: 0

## 2022-10-14 NOTE — PROGRESS NOTES
Subjective:      Patient Id: Jose Knox at home in [place] for follow-up palliative medicine visit. She was accompanied to the appointment by: Spouse. Chief Complaint   Patient presents with    Pain         HPI       Alpha Osler is a 75 y.o. female seen for symptom management. Josseline Stacy has complex medical history that includes fibromyagia, OA, CHF, and End stage RA with multiple contractures. General: Patient is A+Ox4 and in NAD. Functional Status: Baseline functional status is up walking with rollator, weak gait. Lives at home with spouse. No recent falls. Pain: Patient with history of chronic pain due to severe RA, fibromyalgia, and neuropathy. Pain occurs in joints throughout body. Has multiple contractures of joints. Also has muscular pain. Currently rates the pain at a 7/10 and describes pain as a \"vise\" and \"pushing everything inward\". Pain is so bad that she has been crying a lot. Patient also has sensitive skin, and it hurts for her skin to be touched, especially in her BLEs. Currently taking Percocet 10/325 1 tabs every 4 hrs PRN and Lyrica 40 mg p.o. 4 times daily. Pain is not controlled on current regimen. Patient has not been sleeping much due to the pain. Denies Sedation, constipation, or other adverse effects on current regime. Pt follows with Dr. Nunu Willis at [facility] for rheumatology. CHF: Denies swelling or SOB. Edema at baseline. Follows with cardiology Dr. Ny Lerner. Started on Midodrine 2.5 mg PO TID for hypotension which has been helping keep blood pressure up. Appetite/weight: Appetite good, eating about the same. Weight stable at 85 lbs. Has some issues with dysphagia, but this is okay as long as she chews her food really well. Denies constipation and diarrhea. Pt has had some bouts of nausea. She has a prescription for Zofran but would like the oral disintegrating tablets as these are easier for her to take. Mood: Has some depression.  Pt reports her zoloft dose was just increased a couple days ago. No suicidal thoughts or ideation. Past Medical History:   Diagnosis Date    CHF (congestive heart failure) ([facility]     Fibromyalgia     Fracture of pelvis (HCC)     left    History of left hip replacement     History of total left knee replacement     History of total right knee replacement     Light sensitivity     bilateral eye    Osteoporosis     Rheumatoid arthritis ([facility]     Sjogrens syndrome ([facility])      Past Surgical History:   Procedure Laterality Date    CATARACT REMOVAL WITH IMPLANT  01/2013    bilateral    CHOLECYSTECTOMY      OTHER SURGICAL HISTORY      bilateral eye implant    OVARY REMOVAL  06/2000    right    REVISION TOTAL HIP ARTHROPLASTY  10/2012    left    TOTAL HIP ARTHROPLASTY  04/1990    left    TOTAL KNEE ARTHROPLASTY  08/1997    right and left     Social History     Socioeconomic History    Marital status:      Spouse name: Not on file    Number of children: Not on file    Years of education: Not on file    Highest education level: Not on file   Occupational History    Not on file   Tobacco Use    Smoking status: Never    Smokeless tobacco: Never   Vaping Use    Vaping Use: Never used   Substance and Sexual Activity    Alcohol use: Never    Drug use: Never    Sexual activity: Not Currently   Other Topics Concern    Not on file   Social History Narrative    Not on file     Social Determinants of Health     Financial Resource Strain: Not on file   Food Insecurity: Not on file   Transportation Needs: Not on file   Physical Activity: Inactive    Days of Exercise per Week: 0 days    Minutes of Exercise per Session: 0 min   Stress: Not on file   Social Connections: Not on file   Intimate Partner Violence: Not on file   Housing Stability: Not on file     History reviewed. No pertinent family history. Allergies   Allergen Reactions    Hydrocodone-Acetaminophen Other (See Comments)     Dizzy, Nausea.     Hydroxychloroquine Other (See Comments)     Nausea, stomach upset    Indomethacin Other (See Comments)     Dizziness, nausea, rash and vomiting    Amoxicillin Diarrhea and Nausea And Vomiting    Lactose Nausea And Vomiting    Other      \"Cheap metals\"  rash     Current Outpatient Medications on File Prior to Visit   Medication Sig Dispense Refill    oxyCODONE-acetaminophen (PERCOCET)  MG per tablet Take 1 tablet by mouth every 4 hours as needed for Pain for up to 30 days. 180 tablet 0    sertraline (ZOLOFT) 25 MG tablet Take 1 tablet by mouth daily Take 1/2 tablet PO Qdaily x 1 week, then 1 tab Po Qdaily 90 tablet 0    naloxone 4 MG/0.1ML LIQD nasal spray ADMINISTER A SINGLE SPRAY IN ONE NOSTRIL UPON SIGNS OF OPIOID OVERDOSE. CALL 911. REPEAT AFTER 3 MINUTES IF NO RESPONSE.      midodrine (PROAMATINE) 2.5 MG tablet Take 1 tablet by mouth 3 times daily 270 tablet 3    metoclopramide (REGLAN) 10 MG tablet Take 0.5 tablets by mouth 3 times daily (with meals) 270 tablet 3    pregabalin (LYRICA) 20 MG/ML SOLN solution Take 2 mLs by mouth 4 times daily for 90 days.  (Patient taking differently: Take 3 mLs by mouth 4 times daily.) 240 mL 2    ondansetron (ZOFRAN) 4 MG tablet Take 1 tablet by mouth every 6 hours as needed for Nausea or Vomiting 30 tablet 0    furosemide (LASIX) 20 MG tablet Take 0.5 tablets by mouth daily as needed (edema) 90 tablet 3    pantoprazole (PROTONIX) 40 MG tablet Take 1 tablet by mouth every morning (before breakfast) 30 tablet 11    etanercept (ENBREL) 50 MG/ML injection Inject into the skin once a week Takes monday      folic acid (FOLVITE) 1 MG tablet TAKE 1 TABLET BY MOUTH ONCE DAILY      predniSONE (DELTASONE) 5 MG tablet Take 5 mg by mouth daily      Calcium Carbonate-Vit D-Min (CALTRATE 600+D PLUS PO) Take by mouth daily      levothyroxine (SYNTHROID) 100 MCG tablet TAKE 1 TABLET BY MOUTH ONCE DAILY      Lifitegrast (XIIDRA) 5 % SOLN Place 1 drop into both eyes 2 times daily      zoledronic acid (RECLAST) 5 MG/100ML SOLN Infuse 5 mg intravenously once Yearly      Multiple Vitamins-Minerals (CENTRUM ADULTS PO) Take by mouth daily      Carboxymethylcellul-Glycerin (REFRESH RELIEVA OP) Apply to eye as needed       No current facility-administered medications on file prior to visit. Review of Systems   Constitutional:  Negative for activity change, appetite change and unexpected weight change. HENT:  Positive for trouble swallowing (none if she eats very slow). Respiratory:  Negative for cough, shortness of breath and wheezing. Cardiovascular:  Negative for chest pain and leg swelling. Gastrointestinal: Negative. Negative for abdominal pain, constipation, diarrhea and nausea. Endocrine: Negative. Genitourinary: Negative. Musculoskeletal:  Positive for arthralgias, back pain, joint swelling and myalgias. Skin: Negative. Negative for color change and rash. Allergic/Immunologic: Negative. Neurological:  Positive for weakness (ongoing). Negative for dizziness, tremors and syncope. Hematological: Negative. Psychiatric/Behavioral:  Positive for dysphoric mood (improving) and sleep disturbance (due to pain). Negative for confusion. The patient is not nervous/anxious. Objective:   /84   Pulse 80   Temp 98.8 °F (37.1 °C) (Temporal)   SpO2 93%    Wt Readings from Last 3 Encounters:   09/02/22 85 lb (38.6 kg)   08/30/22 85 lb (38.6 kg)   06/06/22 84 lb (38.1 kg)     Physical Exam  Constitutional:       General: She is not in acute distress. Appearance: She is underweight. HENT:      Head: Normocephalic and atraumatic. Nose: No rhinorrhea. Eyes:      General: No scleral icterus. Right eye: No discharge. Left eye: No discharge. Extraocular Movements: Extraocular movements intact. Conjunctiva/sclera: Conjunctivae normal.   Cardiovascular:      Rate and Rhythm: Normal rate and regular rhythm.    Pulmonary:      Effort: Pulmonary effort is normal.      Breath sounds: Decreased breath sounds present. No wheezing or rales. Abdominal:      General: Bowel sounds are normal. There is no distension. Palpations: Abdomen is soft. Tenderness: There is no abdominal tenderness. Musculoskeletal:         General: Deformity (significant hand deformity) present. Cervical back: Neck supple. Right lower leg: No edema. Left lower leg: No edema. Skin:     General: Skin is warm and dry. Neurological:      Mental Status: She is alert and oriented to person, place, and time. Motor: Weakness present. Psychiatric:         Mood and Affect: Mood normal.         Behavior: Behavior normal.         Thought Content: Thought content normal.         Judgment: Judgment normal.       Assessment and Plan:      1. Age-related osteoporosis without current pathological fracture  2. Rheu arthritis w rheu factor mult site w/o org/sys involv ([facility]  Continue to follow with rheumatology. Next appointment in November. 3. Contracture of joint of multiple sites  4. Other chronic pain  5. Fibromyalgia  6. Neuropathic pain  7. Spasticity  Pain is not controlled on current regimen. Would like to adjust regimen slowly to reduce risk for ADRs. Continue Percocet 10/325 every 4 hours as needed for Pain. Increase Lyrica 20 mg/ml to 3mL QID for a total of 60 mg 4 times a day. Pt has tried patches in the past, does not tolerate. Unable to prescribe extended release medications due to patient having to crush medications in setting of dysphagia. Pt is tolerating current pain meds without adverse effects or over sedation. Lowest effective dose used. Pt advised to call and notify palliative care for any adverse effects or sedation  Pt is able to maintain adequate functional level and participate in ADLs  OARRS reviewed. There is no indication of aberrant behavior  Pt advised to call for increasing or uncontrolled pain. Risk vs benefit assessed. Pt educated on risk of addiction.    Pt advised to take only as prescribed and not to change frequency of pain meds without consulting palliative care first.   Christina Wheeler is at pt house if needed. Pt spouse is very involved in pt care and health status. 8. Dysphagia, unspecified type  9. Underweight  Stable. Continue diet as tollerated. Small, frequent meals. Maximize calories and protein  Daily nutritional shakes. Remeron d/c'd due to side   effects. 10. Abnormality of gait  Functional status at baseline. Up with a rollator. 11. Edema, unspecified type  12. Chronic congestive heart failure, unspecified heart failure type (HCC)  Baseline.     -Continue diuretic as prescribed  -Daily weights, call if you gain 3 lbs in one day or 5 lbs. in one week, or for increased edema, sob, cough, orthopnea, or chest pain    -Maintain follow up with cardiology- Dr. Christiane Hector    13. Depression  Had recent dose adjustment of zoloft. Currently taking 37.5 mg daily. 14. Disturbance of sleep pattern associated with pain  Goal is to get better pain control to help with sleep. 15. Nausea  Oral disintegrating tabs ordered. 16. Palliative care encounter  Discussed symptom management related to chronic disease/condition. Provided emotional support and active listening. Patient understands and is agreeable to current plan. Due to acuity, symptomatology and high-risk medication management, I advised patient to Return in about 10 weeks (around 12/23/2022). Due to scheduling availability within department. Total Time 45 mins     Total time includes reviewing labs, reviewing history, medications, and allergies, counseling patient, performing medically appropriate evaluation and examination, ordering medications, and documenting in the medical record.      Rosemarie Snellen, APRN - CNP    Collaborating physician: Dr. Dennis Beal

## 2022-10-21 DIAGNOSIS — Z51.5 ENCOUNTER FOR PALLIATIVE CARE: ICD-10-CM

## 2022-10-21 DIAGNOSIS — M79.7 FIBROMYALGIA, SECONDARY: ICD-10-CM

## 2022-10-21 DIAGNOSIS — M05.79 RHEU ARTHRITIS W RHEU FACTOR MULT SITE W/O ORG/SYS INVOLV (HCC): ICD-10-CM

## 2022-10-21 DIAGNOSIS — M81.0 AGE-RELATED OSTEOPOROSIS WITHOUT CURRENT PATHOLOGICAL FRACTURE: ICD-10-CM

## 2022-10-21 DIAGNOSIS — G89.29 OTHER CHRONIC PAIN: ICD-10-CM

## 2022-10-21 PROBLEM — R52 DISTURBANCE OF SLEEP PATTERN ASSOCIATED WITH PAIN: Status: ACTIVE | Noted: 2022-10-21

## 2022-10-21 PROBLEM — R11.0 NAUSEA: Status: ACTIVE | Noted: 2022-10-21

## 2022-10-21 PROBLEM — G47.9 DISTURBANCE OF SLEEP PATTERN ASSOCIATED WITH PAIN: Status: ACTIVE | Noted: 2022-10-21

## 2022-10-21 PROBLEM — R63.6 UNDERWEIGHT: Status: ACTIVE | Noted: 2022-10-21

## 2022-10-21 PROBLEM — R25.2 SPASTICITY: Status: ACTIVE | Noted: 2022-10-21

## 2022-10-21 RX ORDER — PREGABALIN 20 MG/ML
4 SOLUTION ORAL 4 TIMES DAILY
Qty: 480 ML | Refills: 2 | Status: SHIPPED | OUTPATIENT
Start: 2022-10-21 | End: 2023-01-19

## 2022-10-21 NOTE — PROGRESS NOTES
Palliative RN spoke with spouse to follow up on patient's pain. The lyrica is helping some, but patient is still having a lot of discomfort. Will increase dose further to 4 mL (80 mg) po QID. Sent to pharmacy.

## 2022-10-28 ENCOUNTER — TELEPHONE (OUTPATIENT)
Dept: PALLATIVE CARE | Age: 67
End: 2022-10-28

## 2022-10-28 NOTE — TELEPHONE ENCOUNTER
Pt  was advise to call in and give an update on the patients conditions since the medication dosage increase on the Lyrica. Pt  states that her mood has improved, pain level is staying at a level 5 for most of the day. Overall thinks the dose increase is working well.

## 2022-11-04 ENCOUNTER — HOSPITAL ENCOUNTER (OUTPATIENT)
Dept: LAB | Age: 67
Discharge: HOME | End: 2022-11-04
Payer: MEDICARE

## 2022-11-04 DIAGNOSIS — D64.9 ANEMIA, UNSPECIFIED TYPE: ICD-10-CM

## 2022-11-04 DIAGNOSIS — D64.9 ANEMIA, UNSPECIFIED TYPE: Primary | ICD-10-CM

## 2022-11-04 LAB
ANION GAP SERPL CALCULATED.3IONS-SCNC: 15 MEQ/L (ref 9–15)
BASOPHILS ABSOLUTE: 0.1 K/UL (ref 0–0.2)
BASOPHILS RELATIVE PERCENT: 0.8 %
BUN BLDV-MCNC: 12 MG/DL (ref 8–23)
CALCIUM SERPL-MCNC: 8.9 MG/DL (ref 8.5–9.9)
CHLORIDE BLD-SCNC: 96 MEQ/L (ref 95–107)
CO2: 24 MEQ/L (ref 20–31)
CREAT SERPL-MCNC: 0.4 MG/DL (ref 0.5–0.9)
EOSINOPHILS ABSOLUTE: 0 K/UL (ref 0–0.7)
EOSINOPHILS RELATIVE PERCENT: 0.5 %
GFR SERPL CREATININE-BSD FRML MDRD: >60 ML/MIN/{1.73_M2}
GLUCOSE BLD-MCNC: 113 MG/DL (ref 70–99)
HCT VFR BLD CALC: 35 % (ref 37–47)
HEMOGLOBIN: 11 G/DL (ref 12–16)
LYMPHOCYTES ABSOLUTE: 0.9 K/UL (ref 1–4.8)
LYMPHOCYTES RELATIVE PERCENT: 10.3 %
MCH RBC QN AUTO: 27.6 PG (ref 27–31.3)
MCHC RBC AUTO-ENTMCNC: 31.5 % (ref 33–37)
MCV RBC AUTO: 87.7 FL (ref 79.4–94.8)
MONOCYTES ABSOLUTE: 0.9 K/UL (ref 0.2–0.8)
MONOCYTES RELATIVE PERCENT: 10.3 %
NEUTROPHILS ABSOLUTE: 6.8 K/UL (ref 1.4–6.5)
NEUTROPHILS RELATIVE PERCENT: 78.1 %
PDW BLD-RTO: 16.9 % (ref 11.5–14.5)
PLATELET # BLD: 342 K/UL (ref 130–400)
POTASSIUM SERPL-SCNC: 4.4 MEQ/L (ref 3.4–4.9)
RBC # BLD: 3.99 M/UL (ref 4.2–5.4)
SODIUM BLD-SCNC: 135 MEQ/L (ref 135–144)
WBC # BLD: 8.8 K/UL (ref 4.8–10.8)

## 2022-11-04 PROCEDURE — 85025 COMPLETE CBC W/AUTO DIFF WBC: CPT

## 2022-11-04 PROCEDURE — 80048 BASIC METABOLIC PNL TOTAL CA: CPT

## 2022-11-04 PROCEDURE — 36415 COLL VENOUS BLD VENIPUNCTURE: CPT

## 2022-11-07 DIAGNOSIS — G89.29 OTHER CHRONIC PAIN: ICD-10-CM

## 2022-11-07 DIAGNOSIS — M05.79 RHEU ARTHRITIS W RHEU FACTOR MULT SITE W/O ORG/SYS INVOLV (HCC): ICD-10-CM

## 2022-11-07 RX ORDER — OXYCODONE AND ACETAMINOPHEN 10; 325 MG/1; MG/1
1 TABLET ORAL EVERY 4 HOURS PRN
Qty: 180 TABLET | Refills: 0 | Status: SHIPPED | OUTPATIENT
Start: 2022-11-09 | End: 2022-12-09

## 2022-11-07 NOTE — PROGRESS NOTES
Spoke with Samir Cunha on Friday, Lyrica is working with current dose. Rx requested:  Requested Prescriptions     Pending Prescriptions Disp Refills    oxyCODONE-acetaminophen (PERCOCET)  MG per tablet 180 tablet 0     Sig: Take 1 tablet by mouth every 4 hours as needed for Pain for up to 30 days.        Last Office Visit:   Visit date not found      Last filled:  10/11/22  Next Visit Date:  Future Appointments   Date Time Provider Eden Gary   12/19/2022  3:30 PM Bud Higuera MD [address] [place] [address]   12/30/2022  1:00 PM Martha Sutton, [address] [place] [address]

## 2022-11-14 DIAGNOSIS — F32.0 CURRENT MILD EPISODE OF MAJOR DEPRESSIVE DISORDER WITHOUT PRIOR EPISODE (HCC): ICD-10-CM

## 2022-11-15 DIAGNOSIS — F32.0 CURRENT MILD EPISODE OF MAJOR DEPRESSIVE DISORDER WITHOUT PRIOR EPISODE (HCC): ICD-10-CM

## 2022-11-15 RX ORDER — SERTRALINE HYDROCHLORIDE 25 MG/1
TABLET, FILM COATED ORAL
Qty: 90 TABLET | Refills: 0 | OUTPATIENT
Start: 2022-11-15

## 2022-11-15 RX ORDER — SERTRALINE HYDROCHLORIDE 25 MG/1
37.5 TABLET, FILM COATED ORAL DAILY
Qty: 135 TABLET | Refills: 0 | Status: SHIPPED | OUTPATIENT
Start: 2022-11-15

## 2022-11-15 RX ORDER — SERTRALINE HYDROCHLORIDE 25 MG/1
25 TABLET, FILM COATED ORAL DAILY
Qty: 90 TABLET | Refills: 0 | OUTPATIENT
Start: 2022-11-15

## 2022-12-01 ENCOUNTER — TELEPHONE (OUTPATIENT)
Dept: PALLATIVE CARE | Age: 67
End: 2022-12-01

## 2022-12-01 DIAGNOSIS — M81.0 AGE RELATED OSTEOPOROSIS, UNSPECIFIED PATHOLOGICAL FRACTURE PRESENCE: Primary | ICD-10-CM

## 2022-12-01 DIAGNOSIS — M54.6 ACUTE MIDLINE THORACIC BACK PAIN: ICD-10-CM

## 2022-12-01 RX ORDER — CALCITONIN SALMON 200 [IU]/.09ML
1 SPRAY, METERED NASAL DAILY
Qty: 1 EACH | Refills: 0 | Status: SHIPPED | OUTPATIENT
Start: 2022-12-01 | End: 2022-12-08

## 2022-12-01 NOTE — TELEPHONE ENCOUNTER
Pasha Vegas, pt's spouse called in today to explain that Armando Reddy is having 9/10 right sided back pain. He explains that it is her \"entire rib cage\" \"radiates to her spine\". The pain started 11/25/22 and has progressively gotten worse. Armando Reddy says that it \"overrides all other pain\". She can hardly stay up in a chair for 30 mins d/t pain. He reports that she saw the rheumatologist about 2-2.5 weeks ago and this pain wasn't going on. She got up from a chair around this time and they heard a \"crack\" in her left foot, she got x-rays and it was negative. She has no abd strength, having issues getting up- she thinks that he may have done something while trying to get her up by picking her up under her arms. He reports that he is as gentle as he can be and doesn't believe he hurt her. He has been putting on a hempvana cream to her back (recommended by [facility] previously) and it helps slightly. She has been taking the percocet and lyrica as prescribed and still having the 9/10 pain. I asked if she has fallen or if she has hurt herself doing anything else, he reports no. Her right side of the back is not red/swollen/bruised.

## 2022-12-01 NOTE — TELEPHONE ENCOUNTER
Discussed options with spouse Tera [place]. Patient does not want x-ray at this time. Will start by treating patient as if she has a compression fracture due to her hx. Of osteoporosis. Start calcitonin 1 spray nasal daily times 1 week. Call back in one week to update us on pain.

## 2022-12-06 DIAGNOSIS — M05.79 RHEU ARTHRITIS W RHEU FACTOR MULT SITE W/O ORG/SYS INVOLV (HCC): ICD-10-CM

## 2022-12-06 DIAGNOSIS — G89.29 OTHER CHRONIC PAIN: ICD-10-CM

## 2022-12-06 RX ORDER — OXYCODONE AND ACETAMINOPHEN 10; 325 MG/1; MG/1
1 TABLET ORAL EVERY 4 HOURS PRN
Qty: 180 TABLET | Refills: 0 | Status: SHIPPED | OUTPATIENT
Start: 2022-12-08 | End: 2023-01-07

## 2022-12-06 NOTE — PROGRESS NOTES
Pt's spouse called in requesting this referral. He also wanted to update you that he isn't sure if it is the calcitonin but she has had a \"few minor breaks\" in her pain and pain is down \"slightly\" since taking the calcitonin. Rx requested:  Requested Prescriptions     Pending Prescriptions Disp Refills    oxyCODONE-acetaminophen (PERCOCET)  MG per tablet 180 tablet 0     Sig: Take 1 tablet by mouth every 4 hours as needed for Pain for up to 30 days.        Last Office Visit:   Visit date not found      Last filled:  11/9/22    Next Visit Date:  Future Appointments   Date Time Provider Eden Gary   12/19/2022  3:30 PM Leopoldo Pope,  [facility]   12/30/2022  1:00 PM Demaris Canavan, [facility]

## 2022-12-06 NOTE — PROGRESS NOTES
Okay. Let him know they can stop the calcitonin after 7 days of use. Call us if pain remains an issue and we will adjust pain medication. Did send refill for percocet. Thanks!

## 2022-12-08 ENCOUNTER — TELEPHONE (OUTPATIENT)
Dept: PALLATIVE CARE | Age: 67
End: 2022-12-08

## 2022-12-08 RX ORDER — PREDNISONE 20 MG/1
10 TABLET ORAL SEE ADMIN INSTRUCTIONS
Qty: 25 TABLET | Refills: 0 | Status: SHIPPED | OUTPATIENT
Start: 2022-12-08 | End: 2022-12-18

## 2022-12-08 NOTE — PROGRESS NOTES
I called Mrs. Erickson Sandoval (her  and her on the phone) . She has a chronic back pain with intermittent exacerbations despite multiple medications adjustment including Percocet and Lyrica. Pain described as chronic with intermittent exacerbation increases with movement and physical activity, denies any associated symptoms of numbness, tingling, lower extremity weakness, recent fall, nausea, vomiting  or sphincter dysfunction. The patient and her  consented to symptomatic treatment with:  -Increase Percocet to 1.5 tablet every 6 hours as needed and reevaluate  -Tapering dose of prednisone 40 mg x 3 days, 20 mg 3 days, 10 mg x 3 days and continue with 5 mg.  -Palliative care team will follow up with patient for further recommendations including referral and imaging.

## 2022-12-08 NOTE — TELEPHONE ENCOUNTER
Karley Diego's  Amber Guzman called in today regarding pain issues. On 12/1 I sent Minerva Wong this:  \"rema Holley's spouse called in today to explain that Jose M Client is having 9/10 right sided back pain. He explains that it is her \"entire rib cage\" \"radiates to her spine\". The pain started 11/25/22 and has progressively gotten worse. Jose M Client says that it \"overrides all other pain\". She can hardly stay up in a chair for 30 mins d/t pain. He reports that she saw the rheumatologist about 2-2.5 weeks ago and this pain wasn't going on. She got up from a chair around this time and they heard a \"crack\" in her left foot, she got x-rays and it was negative. She has no abd strength, having issues getting up- she thinks that he may have done something while trying to get her up by picking her up under her arms. He reports that he is as gentle as he can be and doesn't believe he hurt her. He has been putting on a hempvana cream to her back (recommended by [facility] previously) and it helps slightly. She has been taking the percocet and lyrica as prescribed and still having the 9/10 pain. I asked if she has fallen or if she has hurt herself doing anything else, he reports no. Her right side of the back is not red/swollen/bruised. \"    Minerva Wong ordered her calcitonin x7 days and suggested he call back in about a week, if her pain was not under control she could adjust pain medication. He called in to say that Brandie's pain is 9/10, she can only tolerate being up in a chair for about 15 mins at a time. Reports she is in bed about 20 hours out of the day d/t severe pain. Would you be able to address this? His number is [phone] if you need to speak with him. Thank you!

## 2022-12-09 ENCOUNTER — TELEPHONE (OUTPATIENT)
Dept: PALLATIVE CARE | Age: 67
End: 2022-12-09

## 2022-12-09 DIAGNOSIS — M05.79 RHEU ARTHRITIS W RHEU FACTOR MULT SITE W/O ORG/SYS INVOLV (HCC): Primary | ICD-10-CM

## 2022-12-09 RX ORDER — PREDNISONE 20 MG/1
10 TABLET ORAL SEE ADMIN INSTRUCTIONS
Qty: 25 TABLET | Refills: 0 | Status: CANCELLED | OUTPATIENT
Start: 2022-12-09 | End: 2022-12-19

## 2022-12-09 NOTE — TELEPHONE ENCOUNTER
Spoke with Andie Grijalva and Bravo Lam pt's pain is down to a 4/10. Andie Grijalva said OK to take 1.5 tabs of percocet every 4 hours PRN. This is what Nanette Lema thought Dr. Kenzie Mercedes told him to do. I explained she is not to take any additional tylenol. He understands. Will f/u Monday morning to see how her pain is.

## 2022-12-09 NOTE — TELEPHONE ENCOUNTER
Ynes Nair called in to explain that Domingo Sánchez took the 1.5 percocet and this morning her pain was down to a 4/10. The prednisone that was sent to [facility] they don't have record of. Ynes Nair wanted to ensure that this was still the plan, so if you do want the prednisone started please re-send to [facility]. He reports that she has in the past had spinal x-rays done, he only has personal notes of this that the \"C1 and C2 are practically fused together\" and there is \"slippage in her lumbar vertebra\". He said this was ordered by Dr. Carola Gastelum- I can reach out to this office and see if they can send these records over. I told him I updated you this morning about his call in yesterday, and said you would most likely be calling him or explaining to me what you want me to tell him.  Thanks    Rx requested:  Requested Prescriptions      No prescriptions requested or ordered in this encounter       Last Office Visit:   10/14/2022      Last filled:       Next Visit Date:  Future Appointments   Date Time Provider Eden Gary   12/19/2022  3:30 PM Paige Dillon  [facility]   12/30/2022  1:00 PM [id number] [facility]

## 2022-12-09 NOTE — TELEPHONE ENCOUNTER
I think we can hold off on the prednisone. My thought is that she has a fracture. We can get x-rays but I believe they didn't want that. However, if she has generalized increased pain all over, it could be her RA in which the steroid may help. I believe it was just a portion of her back and ribs though that started when she was lifted up out of her seat. If this is still the case, no need for prednisone. As long as the 1.5 percocet every 6 hours is helping, we can continue this. Just make sure they know it is every 6 and not every 4 hours. Thanks!

## 2022-12-16 ENCOUNTER — TELEPHONE (OUTPATIENT)
Dept: PALLATIVE CARE | Age: 67
End: 2022-12-16

## 2022-12-16 NOTE — TELEPHONE ENCOUNTER
Priti Walton called in with an update on Mykel Reyna. Last I talked to him was Monday, 12/12- her pain level was down, under control with some \"spikes\". Starting Tuesday 12/13 through today Mykel Reyna has developed \"severe left leg tremors\" it makes her \"lose control\" Priti Walton explains if he wasn't right there she will fall. She is having major balance issues with these tremors. I asked if she had experienced these tremors in the past- he said yes, when she was on mirtazapine and that's why she was switched over to sertraline. He is wondering if the increased dose of percocet along with the sertraline could be causing the severe tremors? He was reading that high doses of acetaminophen can have this rare side effect. I asked how her pain was, it is the same since Monday- controlled, although he says these side effects are not sustainable. Last BM was yesterday, lying BP this morning was 95/60, HR 58, breathing good, afebrile.

## 2022-12-20 ENCOUNTER — OFFICE VISIT (OUTPATIENT)
Dept: FAMILY MEDICINE CLINIC | Age: 67
End: 2022-12-20
Payer: MEDICARE

## 2022-12-20 ENCOUNTER — TELEPHONE (OUTPATIENT)
Dept: PALLATIVE CARE | Age: 67
End: 2022-12-20

## 2022-12-20 VITALS
DIASTOLIC BLOOD PRESSURE: 80 MMHG | OXYGEN SATURATION: 99 % | WEIGHT: 85 LBS | TEMPERATURE: 98.9 F | SYSTOLIC BLOOD PRESSURE: 124 MMHG | HEART RATE: 79 BPM | BODY MASS INDEX: 15.06 KG/M2

## 2022-12-20 DIAGNOSIS — F32.0 CURRENT MILD EPISODE OF MAJOR DEPRESSIVE DISORDER WITHOUT PRIOR EPISODE (HCC): Primary | ICD-10-CM

## 2022-12-20 DIAGNOSIS — R25.1 TREMOR: ICD-10-CM

## 2022-12-20 PROCEDURE — 1036F TOBACCO NON-USER: CPT | Performed by: FAMILY MEDICINE

## 2022-12-20 PROCEDURE — 99213 OFFICE O/P EST LOW 20 MIN: CPT | Performed by: FAMILY MEDICINE

## 2022-12-20 PROCEDURE — 1123F ACP DISCUSS/DSCN MKR DOCD: CPT | Performed by: FAMILY MEDICINE

## 2022-12-20 PROCEDURE — G8419 CALC BMI OUT NRM PARAM NOF/U: HCPCS | Performed by: FAMILY MEDICINE

## 2022-12-20 PROCEDURE — 1090F PRES/ABSN URINE INCON ASSESS: CPT | Performed by: FAMILY MEDICINE

## 2022-12-20 PROCEDURE — G8427 DOCREV CUR MEDS BY ELIG CLIN: HCPCS | Performed by: FAMILY MEDICINE

## 2022-12-20 PROCEDURE — G8484 FLU IMMUNIZE NO ADMIN: HCPCS | Performed by: FAMILY MEDICINE

## 2022-12-20 PROCEDURE — 3017F COLORECTAL CA SCREEN DOC REV: CPT | Performed by: FAMILY MEDICINE

## 2022-12-20 PROCEDURE — G8400 PT W/DXA NO RESULTS DOC: HCPCS | Performed by: FAMILY MEDICINE

## 2022-12-20 SDOH — ECONOMIC STABILITY: FOOD INSECURITY: WITHIN THE PAST 12 MONTHS, YOU WORRIED THAT YOUR FOOD WOULD RUN OUT BEFORE YOU GOT MONEY TO BUY MORE.: NEVER TRUE

## 2022-12-20 SDOH — ECONOMIC STABILITY: FOOD INSECURITY: WITHIN THE PAST 12 MONTHS, THE FOOD YOU BOUGHT JUST DIDN'T LAST AND YOU DIDN'T HAVE MONEY TO GET MORE.: NEVER TRUE

## 2022-12-20 ASSESSMENT — SOCIAL DETERMINANTS OF HEALTH (SDOH): HOW HARD IS IT FOR YOU TO PAY FOR THE VERY BASICS LIKE FOOD, HOUSING, MEDICAL CARE, AND HEATING?: NOT HARD AT ALL

## 2022-12-20 NOTE — TELEPHONE ENCOUNTER
Claritza Ballesteros called back to update us that she will take the 0.5 tab Zoloft for 1 week, the 0.5 tab every other day for a short period of time and then be weaned off of it per recommendation of Dr. Jolene Acevedo today. Also- tremor is much better. When I talked with Jimmy Delatorre on the phone earlier she had just gotten in the car and was in pain from all the movement. He does believe that at this time her current pain med regimen is working and doesn't feel it needs adjusted at this time.

## 2022-12-20 NOTE — PROGRESS NOTES
Subjective:      Patient ID: Zbigniew Rain is a 75 y.o. female who presents today for:     Chief Complaint   Patient presents with    Discuss Medications     Zoloft, and possible side effects and interreactions IE. Side pain and tremors        HPI  Patient is a very pleasant 74-year-old female presents today with her  to discuss Zoloft. She began experiencing tremors approximately 2 weeks ago. She reduced her Zoloft dosage and tremors have improved. Medication was helpful in terms of controlling and helping with her depression however side effects were too severe. She states that her left leg was shaking uncontrollably and now has significantly improved. She is currently at half of the tablet of the 25 mg dose. Of note patient's opiate medication has also recently been increased which may have played a contributing role to her symptoms. She also has been experiencing right-sided mid to low back pain. She denies any discrete flank pain, dysuria, hematuria fever, chills or discharge.   Past Medical History:   Diagnosis Date    CHF (congestive heart failure) ([facility])     Fibromyalgia     Fracture of pelvis ([facility])     left    History of left hip replacement     History of total left knee replacement     History of total right knee replacement     Light sensitivity     bilateral eye    Osteoarthritis 1977    Osteoporosis     Rheumatoid arthritis ([facility] [address]     Sjogrens syndrome ([facility] [address]      Past Surgical History:   Procedure Laterality Date    CATARACT REMOVAL WITH IMPLANT  01/2013    bilateral    CHOLECYSTECTOMY      EYE SURGERY      JOINT REPLACEMENT      OTHER SURGICAL HISTORY      bilateral eye implant    OVARY REMOVAL  06/2000    right    REVISION TOTAL HIP ARTHROPLASTY  10/2012    left    TOTAL HIP ARTHROPLASTY  04/1990    left    TOTAL KNEE ARTHROPLASTY  08/1997    right and left     Family History   Problem Relation Age of Onset    Arthritis Mother      Social History     Socioeconomic History    Marital status:      Spouse name: Not on file    Number of children: Not on file    Years of education: Not on file    Highest education level: Not on file   Occupational History    Not on file   Tobacco Use    Smoking status: Never    Smokeless tobacco: Never   Vaping Use    Vaping Use: Never used   Substance and Sexual Activity    Alcohol use: Never    Drug use: Never    Sexual activity: Not Currently   Other Topics Concern    Not on file   Social History Narrative    Not on file     Social Determinants of Health     Financial Resource Strain: Low Risk     Difficulty of Paying Living Expenses: Not hard at all   Food Insecurity: No Food Insecurity    Worried About Running Out of Food in the Last Year: Never true    [place]ylah of Food in the Last Year: Never true   Transportation Needs: Not on file   Physical Activity: Inactive    Days of Exercise per Week: 0 days    Minutes of Exercise per Session: 0 min   Stress: Not on file   Social Connections: Not on file   Intimate Partner Violence: Not on file   Housing Stability: Not on file     Current Outpatient Medications on File Prior to Visit   Medication Sig Dispense Refill    oxyCODONE-acetaminophen (PERCOCET)  MG per tablet Take 1 tablet by mouth every 4 hours as needed for Pain for up to 30 days. (Patient taking differently: Take 1.5 tablets by mouth every 4 hours as needed for Pain.) 180 tablet 0    sertraline (ZOLOFT) 25 MG tablet Take 1.5 tablets by mouth daily (Patient taking differently: Take 37.5 mg by mouth daily 1 tab) 135 tablet 0    pregabalin (LYRICA) 20 MG/ML SOLN solution Take 4 mLs by mouth 4 times daily for 90 days. 480 mL 2    ondansetron (ZOFRAN-ODT) 4 MG disintegrating tablet Take 1 tablet by mouth every 8 hours as needed for Nausea or Vomiting 30 tablet 3    naloxone 4 MG/0.1ML LIQD nasal spray ADMINISTER A SINGLE SPRAY IN ONE NOSTRIL UPON SIGNS OF OPIOID OVERDOSE. CALL 911.  REPEAT AFTER 3 MINUTES IF NO RESPONSE.      midodrine (PROAMATINE) 2.5 MG tablet Take 1 tablet by mouth 3 times daily 270 tablet 3    metoclopramide (REGLAN) 10 MG tablet Take 0.5 tablets by mouth 3 times daily (with meals) 270 tablet 3    furosemide (LASIX) 20 MG tablet Take 0.5 tablets by mouth daily as needed (edema) 90 tablet 3    pantoprazole (PROTONIX) 40 MG tablet Take 1 tablet by mouth every morning (before breakfast) 30 tablet 11    etanercept (ENBREL) 50 MG/ML injection Inject into the skin once a week Takes monday      folic acid (FOLVITE) 1 MG tablet TAKE 1 TABLET BY MOUTH ONCE DAILY      predniSONE (DELTASONE) 5 MG tablet Take 5 mg by mouth daily      Calcium Carbonate-Vit D-Min (CALTRATE 600+D PLUS PO) Take by mouth daily      levothyroxine (SYNTHROID) 100 MCG tablet TAKE 1 TABLET BY MOUTH ONCE DAILY      Lifitegrast (XIIDRA) 5 % SOLN Place 1 drop into both eyes 2 times daily      zoledronic acid (RECLAST) 5 MG/100ML SOLN Infuse 5 mg intravenously once Yearly      Multiple Vitamins-Minerals (CENTRUM ADULTS PO) Take by mouth daily      Carboxymethylcellul-Glycerin (REFRESH RELIEVA OP) Apply to eye as needed       No current facility-administered medications on file prior to visit. Allergies:  Hydrocodone-acetaminophen, Hydroxychloroquine, Indomethacin, Amoxicillin, Lactose, and Other    Review of Systems   Constitutional:  Negative for activity change, appetite change and fatigue. Respiratory:  Negative for apnea, cough, chest tightness and shortness of breath. Cardiovascular:  Negative for chest pain, palpitations and leg swelling. Gastrointestinal:  Negative for abdominal pain, blood in stool, constipation, diarrhea, nausea and vomiting. Musculoskeletal:  Positive for arthralgias. Neurological:  Negative for seizures and headaches. Psychiatric/Behavioral:  Negative for hallucinations and suicidal ideas.       Objective:   /80   Pulse 79   Temp 98.9 °F (37.2 °C) (Infrared)   Wt 85 lb (38.6 kg) Comment: wheelchair  SpO2 99%   BMI 15.06 kg/m² Physical Exam  Vitals and nursing note reviewed. Constitutional:       General: She is not in acute distress. Appearance: Normal appearance. She is well-developed. She is not diaphoretic. HENT:      Head: Normocephalic and atraumatic. Nose: Nose normal.      Mouth/Throat:      Mouth: Mucous membranes are moist.      Pharynx: Oropharynx is clear. Eyes:      Conjunctiva/sclera: Conjunctivae normal.      Pupils: Pupils are equal, round, and reactive to light. Cardiovascular:      Rate and Rhythm: Normal rate and regular rhythm. Heart sounds: Normal heart sounds. No murmur heard. No friction rub. No gallop. Pulmonary:      Effort: Pulmonary effort is normal. No respiratory distress. Breath sounds: Normal breath sounds. No wheezing or rales. Chest:      Chest wall: No tenderness. Abdominal:      General: Abdomen is flat. Bowel sounds are normal.      Palpations: Abdomen is soft. Tenderness: There is no abdominal tenderness. Musculoskeletal:      Cervical back: Normal range of motion. Skin:     General: Skin is warm and dry. Neurological:      Mental Status: She is alert and oriented to person, place, and time. Psychiatric:         Behavior: Behavior normal.         Thought Content: Thought content normal.         Judgment: Judgment normal.       Assessment & Plan:     1. Current mild episode of major depressive disorder without prior episode ([address]  Will wean off of Zoloft due to side effects. Patient does not want to try any further medication at this time    2. Tremor  Improving: Likely secondary to medication side effect. However advise further evaluation such as an x-ray as patient has coexisting new onset right-sided lower back pain. Patient declines any further evaluation at this time and would like to explore removing Zoloft before investigating any further    Return if symptoms worsen or fail to improve.     Serena Andre MD

## 2022-12-20 NOTE — TELEPHONE ENCOUNTER
Boyd Ulloa I called Oscar Mon today to check on her. She tells me that the tremor is better, she is down to 1/2 tab of Zoloft daily and they were actually on their way to see Dr. Shad Ramos now. I asked how her back pain is- she said \"not good\" \"7/10\". I told her I would reach out to you to see if there is anything else we can do to help better manage her pain?

## 2022-12-21 ASSESSMENT — ENCOUNTER SYMPTOMS
SHORTNESS OF BREATH: 0
BLOOD IN STOOL: 0
COUGH: 0
NAUSEA: 0
ABDOMINAL PAIN: 0
CONSTIPATION: 0
CHEST TIGHTNESS: 0
VOMITING: 0
APNEA: 0
DIARRHEA: 0

## 2022-12-27 DIAGNOSIS — M79.2 NEUROPATHIC PAIN: Primary | ICD-10-CM

## 2022-12-27 DIAGNOSIS — G89.29 OTHER CHRONIC PAIN: ICD-10-CM

## 2022-12-27 DIAGNOSIS — M54.6 ACUTE MIDLINE THORACIC BACK PAIN: ICD-10-CM

## 2022-12-27 DIAGNOSIS — M05.79 RHEU ARTHRITIS W RHEU FACTOR MULT SITE W/O ORG/SYS INVOLV (HCC): ICD-10-CM

## 2022-12-27 DIAGNOSIS — M24.50 CONTRACTURE OF JOINT OF MULTIPLE SITES: ICD-10-CM

## 2022-12-27 RX ORDER — OXYCODONE HYDROCHLORIDE 15 MG/1
15 TABLET ORAL EVERY 4 HOURS PRN
Qty: 180 TABLET | Refills: 0 | Status: SHIPPED | OUTPATIENT
Start: 2022-12-30 | End: 2023-01-29

## 2022-12-27 RX ORDER — OXYCODONE AND ACETAMINOPHEN 10; 325 MG/1; MG/1
1.5 TABLET ORAL EVERY 4 HOURS PRN
Status: CANCELLED | OUTPATIENT
Start: 2022-12-27 | End: 2023-01-26

## 2022-12-27 NOTE — TELEPHONE ENCOUNTER
Pt  calling in to request refill on pain meds 1.5 pills every 4hrs as needed so the quantity needs to be updated. He said that you guys already discussed this change, he just wanted to remind you.

## 2022-12-27 NOTE — TELEPHONE ENCOUNTER
Spoke with spouse. Changed percocet to oxycodone 15 mg po q 4 hours prn so that patient was not consistently taking large quantities of tylenol.

## 2022-12-30 ENCOUNTER — OFFICE VISIT (OUTPATIENT)
Dept: PALLATIVE CARE | Age: 67
End: 2022-12-30
Payer: MEDICARE

## 2022-12-30 VITALS
HEART RATE: 72 BPM | SYSTOLIC BLOOD PRESSURE: 91 MMHG | TEMPERATURE: 98.6 F | DIASTOLIC BLOOD PRESSURE: 62 MMHG | OXYGEN SATURATION: 94 %

## 2022-12-30 DIAGNOSIS — I50.9 CHRONIC CONGESTIVE HEART FAILURE, UNSPECIFIED HEART FAILURE TYPE (HCC): ICD-10-CM

## 2022-12-30 DIAGNOSIS — M79.7 FIBROMYALGIA, SECONDARY: ICD-10-CM

## 2022-12-30 DIAGNOSIS — R13.10 DYSPHAGIA, UNSPECIFIED TYPE: ICD-10-CM

## 2022-12-30 DIAGNOSIS — M05.79 RHEU ARTHRITIS W RHEU FACTOR MULT SITE W/O ORG/SYS INVOLV (HCC): ICD-10-CM

## 2022-12-30 DIAGNOSIS — R26.9 ABNORMALITY OF GAIT: ICD-10-CM

## 2022-12-30 DIAGNOSIS — Z51.5 PALLIATIVE CARE ENCOUNTER: ICD-10-CM

## 2022-12-30 DIAGNOSIS — M81.0 AGE RELATED OSTEOPOROSIS, UNSPECIFIED PATHOLOGICAL FRACTURE PRESENCE: ICD-10-CM

## 2022-12-30 DIAGNOSIS — M24.50 CONTRACTURE OF JOINT OF MULTIPLE SITES: ICD-10-CM

## 2022-12-30 DIAGNOSIS — M79.2 NEUROPATHIC PAIN: ICD-10-CM

## 2022-12-30 DIAGNOSIS — F32.A DEPRESSION, UNSPECIFIED DEPRESSION TYPE: ICD-10-CM

## 2022-12-30 DIAGNOSIS — R25.2 SPASTICITY: ICD-10-CM

## 2022-12-30 DIAGNOSIS — R63.6 UNDERWEIGHT: ICD-10-CM

## 2022-12-30 DIAGNOSIS — G89.29 OTHER CHRONIC PAIN: Primary | ICD-10-CM

## 2022-12-30 DIAGNOSIS — R11.0 NAUSEA: ICD-10-CM

## 2022-12-30 DIAGNOSIS — R60.9 EDEMA, UNSPECIFIED TYPE: ICD-10-CM

## 2022-12-30 PROCEDURE — G8484 FLU IMMUNIZE NO ADMIN: HCPCS | Performed by: NURSE PRACTITIONER

## 2022-12-30 PROCEDURE — G8419 CALC BMI OUT NRM PARAM NOF/U: HCPCS | Performed by: NURSE PRACTITIONER

## 2022-12-30 PROCEDURE — 1036F TOBACCO NON-USER: CPT | Performed by: NURSE PRACTITIONER

## 2022-12-30 PROCEDURE — 3017F COLORECTAL CA SCREEN DOC REV: CPT | Performed by: NURSE PRACTITIONER

## 2022-12-30 PROCEDURE — 99349 HOME/RES VST EST MOD MDM 40: CPT | Performed by: NURSE PRACTITIONER

## 2022-12-30 PROCEDURE — 1123F ACP DISCUSS/DSCN MKR DOCD: CPT | Performed by: NURSE PRACTITIONER

## 2022-12-30 PROCEDURE — 1090F PRES/ABSN URINE INCON ASSESS: CPT | Performed by: NURSE PRACTITIONER

## 2022-12-30 ASSESSMENT — ENCOUNTER SYMPTOMS
COUGH: 0
NAUSEA: 0
COLOR CHANGE: 0
SHORTNESS OF BREATH: 0
ALLERGIC/IMMUNOLOGIC NEGATIVE: 1
TROUBLE SWALLOWING: 1
CONSTIPATION: 0
GASTROINTESTINAL NEGATIVE: 1
ABDOMINAL PAIN: 0
BACK PAIN: 1
DIARRHEA: 0
WHEEZING: 0

## 2022-12-30 NOTE — PROGRESS NOTES
Subjective:      Patient Id: Lashay Balderrama at home in [place] for follow-up palliative medicine visit. She was accompanied to the appointment by: Spouse. Chief Complaint   Patient presents with    Pain    Follow-up           HPI       Katy Mcfadden is a 75 y.o. female seen for symptom management. Bethanie Goodwin has complex medical history that includes fibromyagia, OA, CHF, and End stage RA with multiple contractures. General: Patient is A+Ox4 and in NAD. Functional Status: Baseline functional status is up walking with rollator, weak gait. Lives at home with spouse. No recent falls. Pain: Pain has improved since increasing pain medication. Patient with history of chronic pain due to severe RA, fibromyalgia, and neuropathy. Pain occurs in joints throughout body. Has multiple contractures of joints. Also has muscular pain. Currently rates the pain at a 4-5/10 and describes pain as a \"vise-like and pressure\" and \"pushing everything inward\". Currently taking oxycodone 15 mg po 1 tab every 4 hrs PRN and Lyrica 80 mg p.o. 4 times daily. Denies Sedation, constipation, or other adverse effects on current regime. Pt follows with Dr. Kristen Merritt at [facility] for rheumatology. Side/back pain is a lot better since Thanksgiving. Patient has been sleeping well at night. CHF: Denies swelling or SOB. Edema at baseline. Some leg swelling in left leg. No coughing or wheezing. Follows with cardiology Dr. Maylin Suarez. Previously started on Midodrine 2.5 mg PO TID for hypotension. Appetite/weight: Appetite good, eating about the same. Weight stable at 85 lbs. Has some issues with dysphagia, but this is okay as long as she chews her food really well. Denies constipation and diarrhea. Nausea controlled with prn zofran. Mood: Mood has improved since pain is better managed. Weaning off zoloft due to leg tremors. Has a few days left. Tremoring has been mild now.       Past Medical History:   Diagnosis Date    CHF (congestive heart failure) ([facility]     Fibromyalgia     Fracture of pelvis (HCC)     left    History of left hip replacement     History of total left knee replacement     History of total right knee replacement     Light sensitivity     bilateral eye    Osteoarthritis 1977    Osteoporosis     Rheumatoid arthritis ([facility] [address]     Sjogrens syndrome ([facility] [address]      Past Surgical History:   Procedure Laterality Date    CATARACT REMOVAL WITH IMPLANT  01/2013    bilateral    CHOLECYSTECTOMY      EYE SURGERY      JOINT REPLACEMENT      OTHER SURGICAL HISTORY      bilateral eye implant    OVARY REMOVAL  06/2000    right    REVISION TOTAL HIP ARTHROPLASTY  10/2012    left    TOTAL HIP ARTHROPLASTY  04/1990    left    TOTAL KNEE ARTHROPLASTY  08/1997    right and left     Social History     Socioeconomic History    Marital status:      Spouse name: Not on file    Number of children: Not on file    Years of education: Not on file    Highest education level: Not on file   Occupational History    Not on file   Tobacco Use    Smoking status: Never    Smokeless tobacco: Never   Vaping Use    Vaping Use: Never used   Substance and Sexual Activity    Alcohol use: Never    Drug use: Never    Sexual activity: Not Currently   Other Topics Concern    Not on file   Social History Narrative    Not on file     Social Determinants of Health     Financial Resource Strain: Low Risk     Difficulty of Paying Living Expenses: Not hard at all   Food Insecurity: No Food Insecurity    Worried About Running Out of Food in the Last Year: Never true    Ran Out of Food in the Last Year: Never true   Transportation Needs: Not on file   Physical Activity: Inactive    Days of Exercise per Week: 0 days    Minutes of Exercise per Session: 0 min   Stress: Not on file   Social Connections: Not on file   Intimate Partner Violence: Not on file   Housing Stability: Not on file     Family History   Problem Relation Age of Onset    Arthritis Mother      Allergies   Allergen Reactions Hydrocodone-Acetaminophen Other (See Comments)     Dizzy, Nausea. Hydroxychloroquine Other (See Comments)     Nausea, stomach upset    Indomethacin Other (See Comments)     Dizziness, nausea, rash and vomiting    Amoxicillin Diarrhea and Nausea And Vomiting    Lactose Nausea And Vomiting    Other      \"Cheap metals\"  rash     No current facility-administered medications on file prior to visit. Current Outpatient Medications on File Prior to Visit   Medication Sig Dispense Refill    oxyCODONE (OXY-IR) 15 MG immediate release tablet Take 1 tablet by mouth every 4 hours as needed for Pain (moderate to severe pain) for up to 30 days. Max Daily Amount: 90 mg 180 tablet 0    sertraline (ZOLOFT) 25 MG tablet Take 1.5 tablets by mouth daily (Patient taking differently: Take 12.5 mg by mouth every other day Ends in 3 days. ) 135 tablet 0    pregabalin (LYRICA) 20 MG/ML SOLN solution Take 4 mLs by mouth 4 times daily for 90 days. 480 mL 2    ondansetron (ZOFRAN-ODT) 4 MG disintegrating tablet Take 1 tablet by mouth every 8 hours as needed for Nausea or Vomiting 30 tablet 3    naloxone 4 MG/0.1ML LIQD nasal spray ADMINISTER A SINGLE SPRAY IN ONE NOSTRIL UPON SIGNS OF OPIOID OVERDOSE. CALL 911.  REPEAT AFTER 3 MINUTES IF NO RESPONSE.      midodrine (PROAMATINE) 2.5 MG tablet Take 1 tablet by mouth 3 times daily 270 tablet 3    metoclopramide (REGLAN) 10 MG tablet Take 0.5 tablets by mouth 3 times daily (with meals) 270 tablet 3    furosemide (LASIX) 20 MG tablet Take 0.5 tablets by mouth daily as needed (edema) 90 tablet 3    pantoprazole (PROTONIX) 40 MG tablet Take 1 tablet by mouth every morning (before breakfast) 30 tablet 11    etanercept (ENBREL) 50 MG/ML injection Inject into the skin once a week Takes monday      folic acid (FOLVITE) 1 MG tablet TAKE 1 TABLET BY MOUTH ONCE DAILY      predniSONE (DELTASONE) 5 MG tablet Take 5 mg by mouth daily      Calcium Carbonate-Vit D-Min (CALTRATE 600+D PLUS PO) Take by mouth daily      levothyroxine (SYNTHROID) 100 MCG tablet TAKE 1 TABLET BY MOUTH ONCE DAILY      Lifitegrast (XIIDRA) 5 % SOLN Place 1 drop into both eyes 2 times daily      zoledronic acid (RECLAST) 5 MG/100ML SOLN Infuse 5 mg intravenously once Yearly      Multiple Vitamins-Minerals (CENTRUM ADULTS PO) Take by mouth daily      Carboxymethylcellul-Glycerin (REFRESH RELIEVA OP) Apply to eye as needed         Review of Systems   Constitutional:  Negative for activity change, appetite change and unexpected weight change. HENT:  Positive for trouble swallowing (none if she eats very slow). Respiratory:  Negative for cough, shortness of breath and wheezing. Cardiovascular:  Negative for chest pain and leg swelling. Gastrointestinal: Negative. Negative for abdominal pain, constipation, diarrhea and nausea. Endocrine: Negative. Genitourinary: Negative. Musculoskeletal:  Positive for arthralgias, back pain, joint swelling and myalgias. Skin: Negative. Negative for color change and rash. Allergic/Immunologic: Negative. Neurological:  Positive for weakness (ongoing). Negative for dizziness, tremors and syncope. Hematological: Negative. Psychiatric/Behavioral:  Negative for confusion, dysphoric mood and sleep disturbance (due to pain). The patient is not nervous/anxious. Objective:   BP 91/62   Pulse 72   Temp 98.6 °F (37 °C)   SpO2 94%    Wt Readings from Last 3 Encounters:   01/06/23 88 lb (39.9 kg)   12/20/22 85 lb (38.6 kg)   09/02/22 85 lb (38.6 kg)     Physical Exam  Constitutional:       General: She is not in acute distress. Appearance: She is underweight. HENT:      Head: Normocephalic and atraumatic. Nose: No rhinorrhea. Eyes:      General: No scleral icterus. Right eye: No discharge. Left eye: No discharge. Extraocular Movements: Extraocular movements intact.       Conjunctiva/sclera: Conjunctivae normal.   Cardiovascular:      Rate and Rhythm: Normal rate and regular rhythm. Pulmonary:      Effort: Pulmonary effort is normal.      Breath sounds: Decreased breath sounds present. No wheezing or rales. Abdominal:      General: Bowel sounds are normal. There is no distension. Palpations: Abdomen is soft. Tenderness: There is no abdominal tenderness. Musculoskeletal:         General: Deformity (significant hand deformity) present. Cervical back: Neck supple. Right lower leg: No edema. Left lower leg: Edema (+2 left ankle) present. Skin:     General: Skin is warm and dry. Neurological:      Mental Status: She is alert and oriented to person, place, and time. Motor: Weakness present. Psychiatric:         Mood and Affect: Mood normal.         Behavior: Behavior normal.         Thought Content: Thought content normal.         Judgment: Judgment normal.       Assessment and Plan:      1. Age-related osteoporosis without current pathological fracture  2. Rheu arthritis w rheu factor mult site w/o org/sys involv ([facility]  Continue to follow with rheumatology. 3. Contracture of joint of multiple sites  4. Other chronic pain  5. Fibromyalgia  6. Neuropathic pain  7. Spasticity  Pain is controlled on current regimen of oxycodone 15 mg po q 4 hours prn and lyrica 80 mg po QID. Pt has tried patches in the past, does not tolerate. Unable to prescribe extended release medications due to patient having to crush medications in setting of dysphagia. Pt is tolerating current pain meds without adverse effects or over sedation. Lowest effective dose used. Pt advised to call and notify palliative care for any adverse effects or sedation  Pt is able to maintain adequate functional level and participate in ADLs  OARRS reviewed. There is no indication of aberrant behavior  Pt advised to call for increasing or uncontrolled pain. Risk vs benefit assessed.   Pt advised to take only as prescribed and not to change frequency of pain meds without consulting palliative care first.   Manny Hastings is at pt house if needed. Pt spouse is very involved in pt care and health status. 8. Dysphagia, unspecified type  9. Underweight  Stable. Continue diet as tollerated. Small,   frequent meals. Maximize calories and protein  Daily nutritional shakes. Remeron previously  d/c'd due side effects. 10. Abnormality of gait  Functional status at baseline. Up with a rollator. 11. Edema, unspecified type  12. Chronic congestive heart failure, unspecified heart failure type ([address]  Some edema in left lower extremity. Patient reports this happens intermittently and then goes down after she takes her socks off. CTM. -Continue diuretic as prescribed  -Daily weights, call if you gain 3 lbs in one day or 5 lbs. in one week, or for increased edema, sob, cough, orthopnea, or chest pain    -Maintain follow up with cardiology- Dr. Ten Mock    13. Depression  Stable. CTM. Patient weaning off zoloft. 14. Nausea  Continue zofran prn. 15. Hypotension  Will reach out to Dr. Ten Mock regarding possible increase in midodrine dosage. 16. Palliative care encounter  Discussed symptom management related to chronic disease/condition. Provided emotional support and active listening. Patient understands and is agreeable to current plan. Due to acuity, symptomatology and high-risk medication management, I advised patient to Return in about 2 months (around 2/28/2023). Due to scheduling availability within department.     MDM: ROBIN Gillette - CNP    Collaborating physician: Dr. Alex So

## 2023-01-01 LAB — SARS-COV-2 RESULT: NOT DETECTED

## 2023-01-04 NOTE — PROGRESS NOTES
Patient continuing to have tremors. Previously discussed the difficulty of evaluating underlying cause without further testing and previously recommended ER. Will trial decreasing lyrica to 3 mL po QID. Discussed low BPs with Dr. Winter Spence. Increase midodrine 5 mg TID.

## 2023-01-05 ENCOUNTER — TELEPHONE (OUTPATIENT)
Dept: CARDIOLOGY CLINIC | Age: 68
End: 2023-01-05

## 2023-01-05 NOTE — TELEPHONE ENCOUNTER
Patient is resuming Midodrine (2.5MG-3X's daily)-the medication was increased-patient's  states the increase also increased her leg tremors considerably

## 2023-01-06 ENCOUNTER — APPOINTMENT (OUTPATIENT)
Dept: CT IMAGING | Age: 68
End: 2023-01-06
Payer: MEDICARE

## 2023-01-06 ENCOUNTER — APPOINTMENT (OUTPATIENT)
Dept: GENERAL RADIOLOGY | Age: 68
End: 2023-01-06
Payer: MEDICARE

## 2023-01-06 ENCOUNTER — TELEPHONE (OUTPATIENT)
Dept: PALLATIVE CARE | Age: 68
End: 2023-01-06

## 2023-01-06 ENCOUNTER — HOSPITAL ENCOUNTER (EMERGENCY)
Age: 68
Discharge: HOME | End: 2023-01-06
Attending: EMERGENCY MEDICINE
Payer: MEDICARE

## 2023-01-06 VITALS
DIASTOLIC BLOOD PRESSURE: 76 MMHG | HEIGHT: 63 IN | HEART RATE: 67 BPM | BODY MASS INDEX: 15.59 KG/M2 | WEIGHT: 88 LBS | OXYGEN SATURATION: 95 % | TEMPERATURE: 98.5 F | RESPIRATION RATE: 16 BRPM | SYSTOLIC BLOOD PRESSURE: 121 MMHG

## 2023-01-06 DIAGNOSIS — R53.1 GENERAL WEAKNESS: ICD-10-CM

## 2023-01-06 DIAGNOSIS — R25.1 TREMORS OF NERVOUS SYSTEM: Primary | ICD-10-CM

## 2023-01-06 PROBLEM — M79.2 NEUROPATHIC PAIN: Status: ACTIVE | Noted: 2023-01-06

## 2023-01-06 LAB
ALBUMIN SERPL-MCNC: 3.4 G/DL (ref 3.5–4.6)
ALP BLD-CCNC: 80 U/L (ref 40–130)
ALT SERPL-CCNC: 7 U/L (ref 0–33)
ANION GAP SERPL CALCULATED.3IONS-SCNC: 11 MEQ/L (ref 9–15)
AST SERPL-CCNC: 20 U/L (ref 0–35)
BASOPHILS ABSOLUTE: 0.1 K/UL (ref 0–0.2)
BASOPHILS RELATIVE PERCENT: 1.2 %
BILIRUB SERPL-MCNC: 0.4 MG/DL (ref 0.2–0.7)
BUN BLDV-MCNC: 7 MG/DL (ref 8–23)
C-REACTIVE PROTEIN: 11.8 MG/L (ref 0–5)
CALCIUM SERPL-MCNC: 9.1 MG/DL (ref 8.5–9.9)
CHLORIDE BLD-SCNC: 97 MEQ/L (ref 95–107)
CO2: 27 MEQ/L (ref 20–31)
CREAT SERPL-MCNC: 0.32 MG/DL (ref 0.5–0.9)
EOSINOPHILS ABSOLUTE: 0.1 K/UL (ref 0–0.7)
EOSINOPHILS RELATIVE PERCENT: 1 %
GFR SERPL CREATININE-BSD FRML MDRD: >60 ML/MIN/{1.73_M2}
GLOBULIN: 3.5 G/DL (ref 2.3–3.5)
GLUCOSE BLD-MCNC: 97 MG/DL (ref 70–99)
HCT VFR BLD CALC: 36.4 % (ref 37–47)
HEMOGLOBIN: 11.7 G/DL (ref 12–16)
INFLUENZA A BY PCR: NEGATIVE
INFLUENZA B BY PCR: NEGATIVE
LIPASE: 14 U/L (ref 12–95)
LYMPHOCYTES ABSOLUTE: 1.1 K/UL (ref 1–4.8)
LYMPHOCYTES RELATIVE PERCENT: 14.8 %
MAGNESIUM: 2 MG/DL (ref 1.7–2.4)
MCH RBC QN AUTO: 28.1 PG (ref 27–31.3)
MCHC RBC AUTO-ENTMCNC: 32.1 % (ref 33–37)
MCV RBC AUTO: 87.6 FL (ref 79.4–94.8)
MONOCYTES ABSOLUTE: 0.8 K/UL (ref 0.2–0.8)
MONOCYTES RELATIVE PERCENT: 11 %
NEUTROPHILS ABSOLUTE: 5.3 K/UL (ref 1.4–6.5)
NEUTROPHILS RELATIVE PERCENT: 72 %
PDW BLD-RTO: 15.9 % (ref 11.5–14.5)
PLATELET # BLD: 305 K/UL (ref 130–400)
POTASSIUM SERPL-SCNC: 4.7 MEQ/L (ref 3.4–4.9)
RBC # BLD: 4.16 M/UL (ref 4.2–5.4)
SARS-COV-2, NAAT: NOT DETECTED
SEDIMENTATION RATE, ERYTHROCYTE: 45 MM (ref 0–30)
SODIUM BLD-SCNC: 135 MEQ/L (ref 135–144)
TOTAL CK: 26 U/L (ref 0–170)
TOTAL PROTEIN: 6.9 G/DL (ref 6.3–8)
TROPONIN: <0.01 NG/ML (ref 0–0.01)
WBC # BLD: 7.4 K/UL (ref 4.8–10.8)

## 2023-01-06 PROCEDURE — 85652 RBC SED RATE AUTOMATED: CPT

## 2023-01-06 PROCEDURE — 83735 ASSAY OF MAGNESIUM: CPT

## 2023-01-06 PROCEDURE — 6360000002 HC RX W HCPCS: Performed by: EMERGENCY MEDICINE

## 2023-01-06 PROCEDURE — 36415 COLL VENOUS BLD VENIPUNCTURE: CPT

## 2023-01-06 PROCEDURE — 2580000003 HC RX 258: Performed by: EMERGENCY MEDICINE

## 2023-01-06 PROCEDURE — 99284 EMERGENCY DEPT VISIT MOD MDM: CPT

## 2023-01-06 PROCEDURE — 82550 ASSAY OF CK (CPK): CPT

## 2023-01-06 PROCEDURE — 84484 ASSAY OF TROPONIN QUANT: CPT

## 2023-01-06 PROCEDURE — 87635 SARS-COV-2 COVID-19 AMP PRB: CPT

## 2023-01-06 PROCEDURE — 70450 CT HEAD/BRAIN W/O DYE: CPT

## 2023-01-06 PROCEDURE — 80053 COMPREHEN METABOLIC PANEL: CPT

## 2023-01-06 PROCEDURE — 86140 C-REACTIVE PROTEIN: CPT

## 2023-01-06 PROCEDURE — 85025 COMPLETE CBC W/AUTO DIFF WBC: CPT

## 2023-01-06 PROCEDURE — 83690 ASSAY OF LIPASE: CPT

## 2023-01-06 PROCEDURE — 87502 INFLUENZA DNA AMP PROBE: CPT

## 2023-01-06 PROCEDURE — 71045 X-RAY EXAM CHEST 1 VIEW: CPT

## 2023-01-06 PROCEDURE — 96374 THER/PROPH/DIAG INJ IV PUSH: CPT

## 2023-01-06 RX ORDER — 0.9 % SODIUM CHLORIDE 0.9 %
1000 INTRAVENOUS SOLUTION INTRAVENOUS ONCE
Status: COMPLETED | OUTPATIENT
Start: 2023-01-06 | End: 2023-01-06

## 2023-01-06 RX ORDER — LORAZEPAM 2 MG/ML
1 INJECTION INTRAMUSCULAR ONCE
Status: COMPLETED | OUTPATIENT
Start: 2023-01-06 | End: 2023-01-06

## 2023-01-06 RX ORDER — DIAZEPAM 2 MG/1
2 TABLET ORAL DAILY PRN
Qty: 14 TABLET | Refills: 0 | Status: SHIPPED | OUTPATIENT
Start: 2023-01-06 | End: 2023-01-20

## 2023-01-06 RX ADMIN — SODIUM CHLORIDE 1000 ML: 9 INJECTION, SOLUTION INTRAVENOUS at 15:01

## 2023-01-06 RX ADMIN — LORAZEPAM 1 MG: 2 INJECTION INTRAMUSCULAR; INTRAVENOUS at 15:03

## 2023-01-06 ASSESSMENT — PAIN - FUNCTIONAL ASSESSMENT: PAIN_FUNCTIONAL_ASSESSMENT: 0-10

## 2023-01-06 ASSESSMENT — ENCOUNTER SYMPTOMS
ABDOMINAL PAIN: 0
DIARRHEA: 0
NAUSEA: 0
SORE THROAT: 0
VOMITING: 0
COUGH: 0
SHORTNESS OF BREATH: 0
BACK PAIN: 0

## 2023-01-06 ASSESSMENT — PAIN SCALES - GENERAL: PAINLEVEL_OUTOF10: 6

## 2023-01-06 ASSESSMENT — PAIN DESCRIPTION - ORIENTATION: ORIENTATION: LEFT

## 2023-01-06 ASSESSMENT — PAIN DESCRIPTION - DESCRIPTORS: DESCRIPTORS: ACHING

## 2023-01-06 ASSESSMENT — PAIN DESCRIPTION - PAIN TYPE: TYPE: ACUTE PAIN;CHRONIC PAIN

## 2023-01-06 ASSESSMENT — PAIN DESCRIPTION - LOCATION: LOCATION: GENERALIZED

## 2023-01-06 NOTE — ED PROVIDER NOTES
[facility] [facility] ED  eMERGENCYdEPARTMENT eNCOUnter      Pt Name: Saad Doran  MRN: [id number]  [place] 1955  Date of evaluation: 1/6/2023  Celena Gibson MD    CHIEF COMPLAINT           HPI  Saad Doran is a 75 y.o. female per chart review has a h/o CHF, fibromyalgia, RA presents to the ED with tremors. Pt notes gradual onset, moderate, intermittent, diffuse tremors x 3 weeks. Pt denies fever, n/v, cp, sob, ab pain, dysuria, diarrhea. Pt notes chronic arthalgias due to RA. ROS  Review of Systems   Constitutional:  Negative for activity change, chills and fever. HENT:  Negative for ear pain and sore throat. Eyes:  Negative for visual disturbance. Respiratory:  Negative for cough and shortness of breath. Cardiovascular:  Negative for chest pain, palpitations and leg swelling. Gastrointestinal:  Negative for abdominal pain, diarrhea, nausea and vomiting. Genitourinary:  Negative for dysuria. Musculoskeletal:  Positive for arthralgias. Negative for back pain. Skin:  Negative for rash. Neurological:  Positive for tremors. Negative for dizziness and weakness. Except as noted above the remainder of the review of systems was reviewed and negative.        PAST MEDICAL HISTORY     Past Medical History:   Diagnosis Date    CHF (congestive heart failure) (HCC)     Fibromyalgia     Fracture of pelvis (HCC)     left    History of left hip replacement     History of total left knee replacement     History of total right knee replacement     Light sensitivity     bilateral eye    Osteoarthritis 1977    Osteoporosis     Rheumatoid arthritis ([address]     Sjogrens syndrome ([address]          SURGICAL HISTORY       Past Surgical History:   Procedure Laterality Date    CATARACT REMOVAL WITH IMPLANT  01/2013    bilateral    CHOLECYSTECTOMY      EYE SURGERY      JOINT REPLACEMENT      OTHER SURGICAL HISTORY      bilateral eye implant    OVARY REMOVAL  06/2000    right    REVISION TOTAL HIP ARTHROPLASTY  10/2012    left    TOTAL HIP ARTHROPLASTY  04/1990    left    TOTAL KNEE ARTHROPLASTY  08/1997    right and left         CURRENTMEDICATIONS       Previous Medications    CALCIUM CARBONATE-VIT D-MIN (CALTRATE 600+D PLUS PO)    Take by mouth daily    CARBOXYMETHYLCELLUL-GLYCERIN (REFRESH RELIEVA OP)    Apply to eye as needed    ETANERCEPT (ENBREL) 50 MG/ML INJECTION    Inject into the skin once a week Takes monday    FOLIC ACID (FOLVITE) 1 MG TABLET    TAKE 1 TABLET BY MOUTH ONCE DAILY    FUROSEMIDE (LASIX) 20 MG TABLET    Take 0.5 tablets by mouth daily as needed (edema)    LEVOTHYROXINE (SYNTHROID) 100 MCG TABLET    TAKE 1 TABLET BY MOUTH ONCE DAILY    LIFITEGRAST (XIIDRA) 5 % SOLN    Place 1 drop into both eyes 2 times daily    METOCLOPRAMIDE (REGLAN) 10 MG TABLET    Take 0.5 tablets by mouth 3 times daily (with meals)    MIDODRINE (PROAMATINE) 2.5 MG TABLET    Take 1 tablet by mouth 3 times daily    MULTIPLE VITAMINS-MINERALS (CENTRUM ADULTS PO)    Take by mouth daily    NALOXONE 4 MG/0.1ML LIQD NASAL SPRAY    ADMINISTER A SINGLE SPRAY IN ONE NOSTRIL UPON SIGNS OF OPIOID OVERDOSE. CALL 911. REPEAT AFTER 3 MINUTES IF NO RESPONSE.    ONDANSETRON (ZOFRAN-ODT) 4 MG DISINTEGRATING TABLET    Take 1 tablet by mouth every 8 hours as needed for Nausea or Vomiting    OXYCODONE (OXY-IR) 15 MG IMMEDIATE RELEASE TABLET    Take 1 tablet by mouth every 4 hours as needed for Pain (moderate to severe pain) for up to 30 days. Max Daily Amount: 90 mg    PANTOPRAZOLE (PROTONIX) 40 MG TABLET    Take 1 tablet by mouth every morning (before breakfast)    PREDNISONE (DELTASONE) 5 MG TABLET    Take 5 mg by mouth daily    PREGABALIN (LYRICA) 20 MG/ML SOLN SOLUTION    Take 4 mLs by mouth 4 times daily for 90 days.     SERTRALINE (ZOLOFT) 25 MG TABLET    Take 1.5 tablets by mouth daily    ZOLEDRONIC ACID (RECLAST) 5 MG/100ML SOLN    Infuse 5 mg intravenously once Yearly       ALLERGIES     Hydrocodone-acetaminophen, Hydroxychloroquine, Indomethacin, Amoxicillin, Lactose, and Other    FAMILY HISTORY       Family History   Problem Relation Age of Onset    Arthritis Mother           SOCIAL HISTORY       Social History     Socioeconomic History    Marital status:      Spouse name: None    Number of children: None    Years of education: None    Highest education level: None   Tobacco Use    Smoking status: Never    Smokeless tobacco: Never   Vaping Use    Vaping Use: Never used   Substance and Sexual Activity    Alcohol use: Never    Drug use: Never    Sexual activity: Not Currently     Social Determinants of Health     Financial Resource Strain: Low Risk     Difficulty of Paying Living Expenses: Not hard at all   Food Insecurity: No Food Insecurity    Worried About [employer] [employer] in the Last Year: Never true    Ran Out of Food in the Last Year: Never true   Physical Activity: Inactive    Days of Exercise per Week: 0 days    Minutes of Exercise per Session: 0 min         PHYSICAL EXAM       ED Triage Vitals [01/06/23 1431]   BP Temp Temp Source Heart Rate Resp SpO2 Height Weight   125/72 98.5 °F (36.9 °C) Oral 73 16 94 % 5' 3\" (1.6 m) 88 lb (39.9 kg)       Physical Exam  Vitals and nursing note reviewed. Constitutional:       Appearance: She is well-developed. HENT:      Head: Normocephalic. Right Ear: External ear normal.      Left Ear: External ear normal.   Eyes:      Conjunctiva/sclera: Conjunctivae normal.      Pupils: Pupils are equal, round, and reactive to light. Cardiovascular:      Rate and Rhythm: Normal rate and regular rhythm. Heart sounds: Normal heart sounds. Pulmonary:      Effort: Pulmonary effort is normal.      Breath sounds: Normal breath sounds. Abdominal:      General: Bowel sounds are normal. There is no distension. Palpations: Abdomen is soft. Tenderness: There is no abdominal tenderness. Musculoskeletal:         General: Normal range of motion.       Cervical back: Normal range of motion and neck supple. Skin:     General: Skin is warm and dry. Neurological:      Mental Status: She is alert and oriented to person, place, and time. Psychiatric:         Mood and Affect: Mood normal.         MDM  76 yo female presents to the ED with tremors. Pt is afebrile, hemodynamically stable. Pt given 1 L NS, IV ativan in the ED. Labs remarkable for CRP 12, ESR 45.  later states that pt is here because she is weak. Pt can normally walk with a walk but for the past 2 weeks pt has been carried around by her . CT head, CXR negative. Pt reassessed and feels better. Pt and  offered admission for rehab and placement in NH however they refused. Pt with advanced rheumatoid arthritis and has failure to thrive. Pt has a palliative care nurse at home. Pt given prescription for valium. Pt and  given weakness, tremor warning signs and will f/u with pcp. FINAL IMPRESSION      1. Tremors of nervous system    2.  General weakness          DISPOSITION/PLAN   DISPOSITION Decision To Discharge 01/06/2023 05:55:29 PM        DISCHARGE MEDICATIONS:  [unfilled]         Mukul Farley MD(electronically signed)  Attending Emergency Physician            Mukul Farley MD  01/06/23 [id number]

## 2023-01-06 NOTE — TELEPHONE ENCOUNTER
Reports tremoring and twitching mostly on the left leg. Tremoring seems to have increased. Patient can barely stand. Family has gone back to using a BSC. They tried going down on lyrica per my previous recommendations (3 mL or 60 mg po QID) with no difference. Recommended ER for further evaluation as I cannot determine the underlying cause.

## 2023-01-06 NOTE — TELEPHONE ENCOUNTER
Pt  calling in to report that the patient is not doing too well with the change of medication. He mention that you guys discussed a possible muscle relaxer ? He would like to try that for her. Trimmers magnified in left leg from the increase of the midodrine 5mg 3 times a day. Patients  states that they went back to the regular dose of 2.5MG. But the trimmers are still magnified. He would like for you to give him a call as soon as possible to discuss these issues.

## 2023-01-09 ENCOUNTER — TELEPHONE (OUTPATIENT)
Dept: PALLATIVE CARE | Age: 68
End: 2023-01-09

## 2023-01-09 DIAGNOSIS — F41.9 ANXIETY: Primary | ICD-10-CM

## 2023-01-09 DIAGNOSIS — R53.1 INCREASED WEAKNESS WHEN AMBULATING: ICD-10-CM

## 2023-01-09 DIAGNOSIS — R25.1 TREMORS OF NERVOUS SYSTEM: ICD-10-CM

## 2023-01-09 DIAGNOSIS — R26.9 ABNORMALITY OF GAIT: ICD-10-CM

## 2023-01-09 RX ORDER — DIAZEPAM 2 MG/1
2 TABLET ORAL 2 TIMES DAILY PRN
Qty: 60 TABLET | Refills: 0 | Status: SHIPPED | OUTPATIENT
Start: 2023-01-12 | End: 2023-02-11

## 2023-01-09 NOTE — TELEPHONE ENCOUNTER
Care discussed with spouse, Kelli Reyna. Patient would like PT and home health aide due to increased weakness. They do not want OT or speech therapy. Patient was given valium post ER visit which has been helping with tremors, however, the valium does not seem to last all day and tremors return. Increase valium to 2 mg po BID prn. Discussed that this is a controlled substance. No ADRs on current regimen. No acute/alarming findings in ER related to tremors. Per spouse, tremors related to patient's physical debility.

## 2023-01-09 NOTE — TELEPHONE ENCOUNTER
Pt  would like for you to give him a call, his wife was in the ER on Friday and want to discuss with you what's going on with her.

## 2023-01-11 DIAGNOSIS — R25.1 TREMOR: Primary | ICD-10-CM

## 2023-01-17 DIAGNOSIS — M05.79 RHEU ARTHRITIS W RHEU FACTOR MULT SITE W/O ORG/SYS INVOLV (HCC): ICD-10-CM

## 2023-01-17 DIAGNOSIS — M81.0 AGE-RELATED OSTEOPOROSIS WITHOUT CURRENT PATHOLOGICAL FRACTURE: ICD-10-CM

## 2023-01-17 DIAGNOSIS — M79.7 FIBROMYALGIA, SECONDARY: ICD-10-CM

## 2023-01-17 DIAGNOSIS — Z51.5 ENCOUNTER FOR PALLIATIVE CARE: ICD-10-CM

## 2023-01-17 DIAGNOSIS — G89.29 OTHER CHRONIC PAIN: ICD-10-CM

## 2023-01-17 RX ORDER — PREGABALIN 20 MG/ML
3 SOLUTION ORAL 4 TIMES DAILY
Qty: 360 ML | Refills: 2 | Status: SHIPPED | OUTPATIENT
Start: 2023-01-17 | End: 2023-04-17

## 2023-01-18 ENCOUNTER — HOSPITAL ENCOUNTER (OUTPATIENT)
Dept: GENERAL RADIOLOGY | Age: 68
Discharge: HOME | End: 2023-01-20
Payer: MEDICARE

## 2023-01-18 ENCOUNTER — HOSPITAL ENCOUNTER (OUTPATIENT)
Age: 68
Discharge: HOME | End: 2023-01-20
Payer: MEDICARE

## 2023-01-18 DIAGNOSIS — R25.1 TREMOR: Primary | ICD-10-CM

## 2023-01-18 DIAGNOSIS — R25.1 TREMOR: ICD-10-CM

## 2023-01-18 PROCEDURE — 72110 X-RAY EXAM L-2 SPINE 4/>VWS: CPT

## 2023-01-20 DIAGNOSIS — R93.7 ABNORMAL X-RAY OF LUMBAR SPINE: ICD-10-CM

## 2023-01-20 DIAGNOSIS — R25.1 TREMOR: Primary | ICD-10-CM

## 2023-01-25 DIAGNOSIS — M24.50 CONTRACTURE OF JOINT OF MULTIPLE SITES: ICD-10-CM

## 2023-01-25 DIAGNOSIS — M05.79 RHEU ARTHRITIS W RHEU FACTOR MULT SITE W/O ORG/SYS INVOLV (HCC): ICD-10-CM

## 2023-01-25 DIAGNOSIS — M79.2 NEUROPATHIC PAIN: ICD-10-CM

## 2023-01-25 DIAGNOSIS — G89.29 OTHER CHRONIC PAIN: ICD-10-CM

## 2023-01-25 DIAGNOSIS — M54.6 ACUTE MIDLINE THORACIC BACK PAIN: ICD-10-CM

## 2023-01-25 RX ORDER — OXYCODONE HYDROCHLORIDE 15 MG/1
15 TABLET ORAL EVERY 4 HOURS PRN
Qty: 180 TABLET | Refills: 0 | Status: SHIPPED | OUTPATIENT
Start: 2023-01-28 | End: 2023-02-27

## 2023-01-26 ENCOUNTER — HOSPITAL ENCOUNTER (OUTPATIENT)
Dept: MRI IMAGING | Age: 68
Discharge: HOME | End: 2023-01-28
Payer: MEDICARE

## 2023-01-26 DIAGNOSIS — M48.062 SPINAL STENOSIS OF LUMBAR REGION WITH NEUROGENIC CLAUDICATION: ICD-10-CM

## 2023-01-26 DIAGNOSIS — M43.16 ANTEROLISTHESIS OF LUMBAR SPINE: Primary | ICD-10-CM

## 2023-01-26 DIAGNOSIS — R25.1 TREMOR: ICD-10-CM

## 2023-01-26 DIAGNOSIS — R93.7 ABNORMAL X-RAY OF LUMBAR SPINE: ICD-10-CM

## 2023-01-26 PROCEDURE — 72148 MRI LUMBAR SPINE W/O DYE: CPT

## 2023-01-30 NOTE — PROGRESS NOTES
Patient Name: Steph Mccartney : 1955        Date: 2023      Type of Appt: Consult     Reason for appt: MRI AT [facility], Anterolisthesis of lumbar spine M48.062 (ICD-10-CM) - Spinal stenosis of lumbar region with neurogenic claudication.  MADE APPOINTMENT.     Referred by: Trina Villeda MD    Studies done: 23 L/S MRI @ [facility], 23 L/S X-ray @ [facility]    Conservative Tx tried: Oxycodone, Lyrica    Smoking: No    REVIEW OF SYSTEMS:    Bruising/Bleeding Easily, Neck Pain, Back Pain                       [facility]) Physicians  Neurosurgery and Pain Management [address] [place] [address] 82: [phone]  F: [phone]          Patient:  Steph Mccartney  YOB: 1955  Date: 2023    The patient is a 75 y.o. female who presents today for evaluation of the following problems:     Chief Complaint   Patient presents with    Back Pain        Referred by Paris Lopez MD      PAST MEDICAL, FAMILY AND SOCIAL HISTORY:  Past Medical History:   Diagnosis Date    CHF (congestive heart failure) ([address]     Fibromyalgia     Fracture of pelvis ([address]     left    History of left hip replacement     History of total left knee replacement     History of total right knee replacement     Light sensitivity     bilateral eye    Osteoarthritis     Osteoporosis     Rheumatoid arthritis ([address]     Sjogrens syndrome ([address]      Past Surgical History:   Procedure Laterality Date    CATARACT REMOVAL WITH IMPLANT  2013    bilateral    CHOLECYSTECTOMY      EYE SURGERY      JOINT REPLACEMENT      OTHER SURGICAL HISTORY      bilateral eye implant    OVARY REMOVAL  2000    right    REVISION TOTAL HIP ARTHROPLASTY  10/2012    left    TOTAL HIP ARTHROPLASTY  1990    left    TOTAL KNEE ARTHROPLASTY  1997    right and left     Family History   Problem Relation Age of Onset    Arthritis Mother      Current Outpatient Medications on File Prior to Visit   Medication Sig Dispense Refill    acetaminophen (TYLENOL) 500 MG tablet Take 500 mg by mouth      oxyCODONE (OXY-IR) 15 MG immediate release tablet Take 1 tablet by mouth every 4 hours as needed for Pain (moderate to severe pain) for up to 30 days. Max Daily Amount: 90 mg 180 tablet 0    pregabalin (LYRICA) 20 MG/ML SOLN solution Take 3 mLs by mouth in the morning, at noon, in the evening, and at bedtime for 90 days. Max Daily Amount: 240 mg 360 mL 2    diazePAM (VALIUM) 2 MG tablet Take 1 tablet by mouth 2 times daily as needed for Anxiety or Sleep for up to 30 days. Max Daily Amount: 4 mg 60 tablet 0    ondansetron (ZOFRAN-ODT) 4 MG disintegrating tablet Take 1 tablet by mouth every 8 hours as needed for Nausea or Vomiting 30 tablet 3    naloxone 4 MG/0.1ML LIQD nasal spray ADMINISTER A SINGLE SPRAY IN ONE NOSTRIL UPON SIGNS OF OPIOID OVERDOSE. CALL 911.  REPEAT AFTER 3 MINUTES IF NO RESPONSE.      midodrine (PROAMATINE) 2.5 MG tablet Take 1 tablet by mouth 3 times daily 270 tablet 3    metoclopramide (REGLAN) 10 MG tablet Take 0.5 tablets by mouth 3 times daily (with meals) 270 tablet 3    furosemide (LASIX) 20 MG tablet Take 0.5 tablets by mouth daily as needed (edema) 90 tablet 3    pantoprazole (PROTONIX) 40 MG tablet Take 1 tablet by mouth every morning (before breakfast) 30 tablet 11    etanercept (ENBREL) 50 MG/ML injection Inject into the skin once a week Takes monday      folic acid (FOLVITE) 1 MG tablet TAKE 1 TABLET BY MOUTH ONCE DAILY      predniSONE (DELTASONE) 5 MG tablet Take 5 mg by mouth daily      Calcium Carbonate-Vit D-Min (CALTRATE 600+D PLUS PO) Take by mouth daily      levothyroxine (SYNTHROID) 100 MCG tablet TAKE 1 TABLET BY MOUTH ONCE DAILY      Lifitegrast (XIIDRA) 5 % SOLN Place 1 drop into both eyes 2 times daily      zoledronic acid (RECLAST) 5 MG/100ML SOLN Infuse 5 mg intravenously once Yearly      Multiple Vitamins-Minerals (CENTRUM ADULTS PO) Take by mouth daily Carboxymethylcellul-Glycerin (REFRESH RELIEVA OP) Apply to eye as needed       No current facility-administered medications on file prior to visit. Social History     Tobacco Use    Smoking status: Never    Smokeless tobacco: Never   Vaping Use    Vaping Use: Never used   Substance Use Topics    Alcohol use: Never    Drug use: Never       ALLERGIES  Allergies   Allergen Reactions    Hydrocodone-Acetaminophen Other (See Comments)     Dizzy, Nausea. Hydroxychloroquine Other (See Comments)     Nausea, stomach upset    Indomethacin Other (See Comments)     Dizziness, nausea, rash and vomiting    Amoxicillin Diarrhea and Nausea And Vomiting    Lactose Nausea And Vomiting    Other      \"Cheap metals\"  rash         REVIEW OF SYSTEMS:  Review of Systems   Constitutional:  Negative for fever. HENT:  Negative for hearing loss. Eyes:  Negative for pain. Respiratory:  Negative for shortness of breath. Gastrointestinal:  Negative for nausea. Endocrine: Negative for heat intolerance. Genitourinary:  Negative for difficulty urinating. Musculoskeletal:  Positive for back pain and neck pain. Skin:  Negative for rash. Allergic/Immunologic: Negative for immunocompromised state. Neurological:  Positive for tremors and weakness. Negative for headaches. Hematological:  Bruises/bleeds easily. Psychiatric/Behavioral:  Negative for sleep disturbance. I have personally reviewed the PMH, PSH, family history, home medications, social history, allergies, ROS. Physical Exam:      Vitals:    01/31/23 0939   Temp: 97.3 °F (36.3 °C)   TempSrc: Temporal   Weight: 88 lb (39.9 kg)   Height: 5' 2\" (1.575 m)     Body mass index is 16.1 kg/m². Physical Exam  Vitals and nursing note reviewed. Constitutional:       Appearance: Normal appearance.       Comments: Severe changes from her progressive and militating rheumatoid arthritis with weakness in the upper extremities decreased range of motion all joints inability to use her hands although she feeds her self. HENT:      Head: Normocephalic and atraumatic. Right Ear: External ear normal.      Left Ear: External ear normal.      Nose: Nose normal.      Mouth/Throat:      Pharynx: Oropharynx is clear. Eyes:      Extraocular Movements: Extraocular movements intact. Cardiovascular:      Pulses: Normal pulses. Pulmonary:      Effort: Pulmonary effort is normal.   Abdominal:      Palpations: Abdomen is soft. Genitourinary:     Rectum: Normal.   Musculoskeletal:         General: Normal range of motion. Cervical back: Normal range of motion. Comments: Severe deformities of both hands secondary to rheumatoid arthritis with minimal movement and strength. Decreased range of motion all joints including shoulders. Skin:     General: Skin is warm. Neurological:      General: No focal deficit present. Comments: 4/5 paraparesis. Wears a pad during the day but maintains bladder and bowel control. Sensory loss especially left lower extremity 3/5. Severe rheumatoid arthritic changes all extremities. Instability right ankle. Psychiatric:         Mood and Affect: Mood normal.        I have reviewed all laboratory studies, reports, data, and pertinent images. EXAMINATION:   XRAY VIEWS OF THE LUMBAR SPINE       1/18/2023 2:55 pm       COMPARISON:   CT of the abdomen and pelvis, October 1, 2021       HISTORY:   ORDERING SYSTEM PROVIDED HISTORY: Tremor   TECHNOLOGIST PROVIDED HISTORY:   What reading provider will be dictating this exam?->CRC       FINDINGS:   There is a slight levoscoliosis. Severe intervertebral disc space narrowing   is seen at L4-5. Facet arthrosis is severe at this level. Also the L5   vertebral body it is again displaced posteriorly. There is approximately 2.1   cm anterolisthesis of L4 on L5. The bony structures are demineralized. The   vertebral body heights are preserved.   Mild degenerative changes are seen in the left SI joint. Total hip arthroplasty is partially visualized on the   left and there is evidence of old pelvic fracture. Impression   Severe degenerative changes are seen at L4-5. The L5 vertebral body   protrudes posteriorly which is unchanged from prior study. The   intervertebral disc space is completely collapsed and there may be fusion at   this level. EXAMINATION:   MRI OF THE LUMBAR SPINE WITHOUT CONTRAST, 1/26/2023 12:53 pm       TECHNIQUE:   Multiplanar multisequence MRI of the lumbar spine was performed without the   administration of intravenous contrast.       COMPARISON:   Sagittal reconstructions from a CT abdomen and pelvis with contrast   10/01/2021. HISTORY:   ORDERING SYSTEM PROVIDED HISTORY: Tremor, Abnormal x-ray of lumbar spine   TECHNOLOGIST PROVIDED HISTORY:   What reading provider will be dictating this exam?->CRC       FINDINGS:   Again, grade 3 anterolisthesis of L4 on L5 is identified 2 cm. The facets of   L4 are again noted to be perched on the facets of L5. Conus medullaris   terminates posterior to the L1 level and has no abnormal signal within it   along its surface. Degenerative changes are noted in the sacroiliac joints. L1-2 level: No significant central canal stenosis or neural foraminal   narrowing. L2-3 level: Facet hypertrophy, thickening of the ligamentum flavum. Mild   narrowing of the lateral recesses. Mild left and no significant right-sided   neural foraminal narrowing. L3-4 level: Thickening of the ligamentum flavum. Mild narrowing of the   lateral recesses. Mild bilateral neural foraminal stenosis. L5-S1 level: Severe canal stenosis noted secondary to the grade 3   anterolisthesis as noted on the prior imaging. Severe right and severe   left-sided neural foraminal stenosis compresses the exiting L4 nerve roots. L5-S1 level: Facet hypertrophy.   No significant central canal stenosis or   neural foraminal narrowing. The bones of the sacrum are in anatomic   alignment. Impression   1. Grade 3 anterolisthesis of L4 on L5 is redemonstrated. The facets of L4   appear perched on L5. Severe canal stenosis at this level is noted. Severe   bilateral neural foraminal stenosis with impingement of the bilateral L4   nerve roots. HISTORY OF PRESENT ILLNESS:  54-year-old female who has had progressive severe debilitating rheumatoid arthritis. She currently has a palliative nurse. Status post left hip surgery bilateral knees. Unstable right ankle has been seen by orthopedic surgery who cannot offer surgical relief. Sees Dr. Juanita Holguin for cardiac disease. For her previous surgeries she is a very difficult intubation. Left hip surgery and bilateral knees. Has been using a walker for many years. Last November developed tremors into her lower extremities for which she was treated with medication to no help. Progressively weaker in both lower extremities. Unable to stand up on her own at this time. Her  has to help her up. Her legs will not support her. She has profound paraparesis both lower extremities but maintains bladder and bowel function. Using above images discussed that she requires surgery and may require tertiary care center. I am consulting Dr. Lisbet Guerra at [facility]. CT scan of the spine will be done. 20-minute discussion after the evaluation with the  of the patient using the images above. Multiple questions about difficult intubation her cardiology condition she does have a DNR in place at this time and she has a palliative nurse. Discussed that surgery offers her a potential for improvement with her paraparesis quality of life that otherwise does not exist.  The other concern is progression with bladder and bowel incontinence. Discussed potential procedures and surgeries postoperative care rehabilitation and so forth.   All questions answered    Plan referral  neurosurgery.  Dr. Madison Sky [phone]  Push images to Mal Jones MD

## 2023-01-31 ENCOUNTER — INITIAL CONSULT (OUTPATIENT)
Dept: NEUROSURGERY | Age: 68
End: 2023-01-31
Payer: MEDICARE

## 2023-01-31 VITALS — BODY MASS INDEX: 16.2 KG/M2 | HEIGHT: 62 IN | TEMPERATURE: 97.3 F | WEIGHT: 88 LBS

## 2023-01-31 DIAGNOSIS — M48.061 MYELOPATHY CONCURRENT WITH AND DUE TO STENOSIS OF LUMBAR SPINE (HCC): ICD-10-CM

## 2023-01-31 DIAGNOSIS — Z51.5 PALLIATIVE CARE ENCOUNTER: ICD-10-CM

## 2023-01-31 DIAGNOSIS — M81.0 AGE-RELATED OSTEOPOROSIS WITHOUT CURRENT PATHOLOGICAL FRACTURE: ICD-10-CM

## 2023-01-31 DIAGNOSIS — G82.20 PARAPARESIS OF BOTH LOWER LIMBS (HCC): Primary | ICD-10-CM

## 2023-01-31 DIAGNOSIS — M05.79 RHEUMATOID ARTHRITIS INVOLVING MULTIPLE SITES WITH POSITIVE RHEUMATOID FACTOR (HCC): ICD-10-CM

## 2023-01-31 DIAGNOSIS — M05.79 RHEU ARTHRITIS W RHEU FACTOR MULT SITE W/O ORG/SYS INVOLV (HCC): ICD-10-CM

## 2023-01-31 DIAGNOSIS — M43.17 ACQUIRED SPONDYLOLISTHESIS OF LUMBOSACRAL REGION: ICD-10-CM

## 2023-01-31 DIAGNOSIS — G99.2 MYELOPATHY CONCURRENT WITH AND DUE TO STENOSIS OF LUMBAR SPINE (HCC): ICD-10-CM

## 2023-01-31 PROCEDURE — 1090F PRES/ABSN URINE INCON ASSESS: CPT | Performed by: NEUROLOGICAL SURGERY

## 2023-01-31 PROCEDURE — G8419 CALC BMI OUT NRM PARAM NOF/U: HCPCS | Performed by: NEUROLOGICAL SURGERY

## 2023-01-31 PROCEDURE — G8484 FLU IMMUNIZE NO ADMIN: HCPCS | Performed by: NEUROLOGICAL SURGERY

## 2023-01-31 PROCEDURE — G8427 DOCREV CUR MEDS BY ELIG CLIN: HCPCS | Performed by: NEUROLOGICAL SURGERY

## 2023-01-31 PROCEDURE — 99204 OFFICE O/P NEW MOD 45 MIN: CPT | Performed by: NEUROLOGICAL SURGERY

## 2023-01-31 RX ORDER — ACETAMINOPHEN 500 MG
500 TABLET ORAL
COMMUNITY

## 2023-01-31 ASSESSMENT — ENCOUNTER SYMPTOMS
NAUSEA: 0
SHORTNESS OF BREATH: 0
BACK PAIN: 1
EYE PAIN: 0

## 2023-02-01 ENCOUNTER — TELEPHONE (OUTPATIENT)
Dept: PALLATIVE CARE | Age: 68
End: 2023-02-01

## 2023-02-01 NOTE — TELEPHONE ENCOUNTER
Provider Neela Chahal Patients   is calling to ask if his wife can get an increase dose of valium because her tremors are getting worse. Seems like the medication is not doing his job he says. If you can give him a call he'll greatly appreciate it.      Anisha Mccauley

## 2023-02-07 ENCOUNTER — TELEPHONE (OUTPATIENT)
Dept: FAMILY MEDICINE CLINIC | Age: 68
End: 2023-02-07

## 2023-02-07 ENCOUNTER — TELEPHONE (OUTPATIENT)
Dept: GASTROENTEROLOGY | Age: 68
End: 2023-02-07

## 2023-02-07 ENCOUNTER — OFFICE VISIT (OUTPATIENT)
Dept: CARDIOLOGY CLINIC | Age: 68
End: 2023-02-07
Payer: MEDICARE

## 2023-02-07 VITALS — SYSTOLIC BLOOD PRESSURE: 122 MMHG | DIASTOLIC BLOOD PRESSURE: 76 MMHG

## 2023-02-07 DIAGNOSIS — I10 HYPERTENSION, UNSPECIFIED TYPE: ICD-10-CM

## 2023-02-07 DIAGNOSIS — I10 ESSENTIAL HYPERTENSION: Primary | ICD-10-CM

## 2023-02-07 DIAGNOSIS — Z01.810 PREOP CARDIOVASCULAR EXAM: ICD-10-CM

## 2023-02-07 PROCEDURE — 3078F DIAST BP <80 MM HG: CPT | Performed by: INTERNAL MEDICINE

## 2023-02-07 PROCEDURE — 3017F COLORECTAL CA SCREEN DOC REV: CPT | Performed by: INTERNAL MEDICINE

## 2023-02-07 PROCEDURE — G8400 PT W/DXA NO RESULTS DOC: HCPCS | Performed by: INTERNAL MEDICINE

## 2023-02-07 PROCEDURE — G8484 FLU IMMUNIZE NO ADMIN: HCPCS | Performed by: INTERNAL MEDICINE

## 2023-02-07 PROCEDURE — 1123F ACP DISCUSS/DSCN MKR DOCD: CPT | Performed by: INTERNAL MEDICINE

## 2023-02-07 PROCEDURE — G8427 DOCREV CUR MEDS BY ELIG CLIN: HCPCS | Performed by: INTERNAL MEDICINE

## 2023-02-07 PROCEDURE — 3074F SYST BP LT 130 MM HG: CPT | Performed by: INTERNAL MEDICINE

## 2023-02-07 PROCEDURE — 1090F PRES/ABSN URINE INCON ASSESS: CPT | Performed by: INTERNAL MEDICINE

## 2023-02-07 PROCEDURE — 1036F TOBACCO NON-USER: CPT | Performed by: INTERNAL MEDICINE

## 2023-02-07 PROCEDURE — G8419 CALC BMI OUT NRM PARAM NOF/U: HCPCS | Performed by: INTERNAL MEDICINE

## 2023-02-07 PROCEDURE — 99214 OFFICE O/P EST MOD 30 MIN: CPT | Performed by: INTERNAL MEDICINE

## 2023-02-07 ASSESSMENT — ENCOUNTER SYMPTOMS
GASTROINTESTINAL NEGATIVE: 1
CHEST TIGHTNESS: 0
NAUSEA: 0
EYES NEGATIVE: 1
SHORTNESS OF BREATH: 0
RESPIRATORY NEGATIVE: 1
WHEEZING: 0
BLOOD IN STOOL: 0
STRIDOR: 0
COUGH: 0

## 2023-02-07 NOTE — PROGRESS NOTES
OFFICE VISIT        Patient: Brandie Jasso  YOB: 1955  MRN: [id number]    Chief Complaint: RA LE Edema.   Chief Complaint   Patient presents with    Follow-up     4 month    Hypertension       CV Data:  11/9/21 LE Vein No DVT   12/21 Echo EF 60 2+ TR RVSP 39     Subjective/HPI very frail lady with progressive LE edeam weakness and cachexia. She has severe RA. She is minimally active.  She has no cp no sob not dizzy.    Recent BNP elevated. She is mild Hyponatremic as well. Started low dose Lasix yesterdy. Edema has gone down some per pt and .     3/16/22 wants to stop Lasix. She is currnelty taking QOD or q3rd day. No cp no sob. Ambulated eating better. Very dizzy BP low.     4/26/22 feels much better on Midorine low dose. Not dizzy no spb no cp no falls no bleed.   No prior CV history    8/30/22 doing well no new issues no cp no sob no falls no bleed. Chronic anemia. Labs reviewed.     2/7/23 preopr L Spine surgery. Told she has risk of paralysis and Bowel and urine loss of control.     Nonsmoker  Lives w      EKG: SR 67    Past Medical History:   Diagnosis Date    CHF (congestive heart failure) ([facility])     Fibromyalgia     Fracture of pelvis ([facility])     left    History of left hip replacement     History of total left knee replacement     History of total right knee replacement     Light sensitivity     bilateral eye    Osteoarthritis 1977    Osteoporosis     Rheumatoid arthritis ([facility])     Sjogrens syndrome ([facility])        Past Surgical History:   Procedure Laterality Date    CATARACT REMOVAL WITH IMPLANT  01/2013    bilateral    CHOLECYSTECTOMY      EYE SURGERY      JOINT REPLACEMENT      OTHER SURGICAL HISTORY      bilateral eye implant    OVARY REMOVAL  06/2000    right    REVISION TOTAL HIP ARTHROPLASTY  10/2012    left    TOTAL HIP ARTHROPLASTY  04/1990    left    TOTAL KNEE ARTHROPLASTY  08/1997    right and left       Family History   Problem Relation Age of Onset    Arthritis  Mother        Social History     Socioeconomic History    Marital status:      Spouse name: None    Number of children: None    Years of education: None    Highest education level: None   Tobacco Use    Smoking status: Never    Smokeless tobacco: Never   Vaping Use    Vaping Use: Never used   Substance and Sexual Activity    Alcohol use: Never    Drug use: Never    Sexual activity: Not Currently     Social Determinants of Health     Financial Resource Strain: Low Risk     Difficulty of Paying Living Expenses: Not hard at all   Food Insecurity: No Food Insecurity    Worried About Running Out of Food in the Last Year: Never true    Ran Out of Food in the Last Year: Never true   Physical Activity: Inactive    Days of Exercise per Week: 0 days    Minutes of Exercise per Session: 0 min       Allergies   Allergen Reactions    Hydrocodone-Acetaminophen Other (See Comments)     Dizzy, Nausea. Hydroxychloroquine Other (See Comments)     Nausea, stomach upset    Indomethacin Other (See Comments)     Dizziness, nausea, rash and vomiting    Amoxicillin Diarrhea and Nausea And Vomiting    Lactose Nausea And Vomiting    Other      \"Cheap metals\"  rash       Current Outpatient Medications   Medication Sig Dispense Refill    acetaminophen (TYLENOL) 500 MG tablet Take 500 mg by mouth      oxyCODONE (OXY-IR) 15 MG immediate release tablet Take 1 tablet by mouth every 4 hours as needed for Pain (moderate to severe pain) for up to 30 days. Max Daily Amount: 90 mg 180 tablet 0    pregabalin (LYRICA) 20 MG/ML SOLN solution Take 3 mLs by mouth in the morning, at noon, in the evening, and at bedtime for 90 days. Max Daily Amount: 240 mg 360 mL 2    diazePAM (VALIUM) 2 MG tablet Take 1 tablet by mouth 2 times daily as needed for Anxiety or Sleep for up to 30 days.  Max Daily Amount: 4 mg 60 tablet 0    ondansetron (ZOFRAN-ODT) 4 MG disintegrating tablet Take 1 tablet by mouth every 8 hours as needed for Nausea or Vomiting 30 tablet 3    naloxone 4 MG/0.1ML LIQD nasal spray ADMINISTER A SINGLE SPRAY IN ONE NOSTRIL UPON SIGNS OF OPIOID OVERDOSE. CALL 911. REPEAT AFTER 3 MINUTES IF NO RESPONSE.      midodrine (PROAMATINE) 2.5 MG tablet Take 1 tablet by mouth 3 times daily 270 tablet 3    metoclopramide (REGLAN) 10 MG tablet Take 0.5 tablets by mouth 3 times daily (with meals) 270 tablet 3    furosemide (LASIX) 20 MG tablet Take 0.5 tablets by mouth daily as needed (edema) 90 tablet 3    pantoprazole (PROTONIX) 40 MG tablet Take 1 tablet by mouth every morning (before breakfast) 30 tablet 11    etanercept (ENBREL) 50 MG/ML injection Inject into the skin once a week Takes monday      folic acid (FOLVITE) 1 MG tablet TAKE 1 TABLET BY MOUTH ONCE DAILY      predniSONE (DELTASONE) 5 MG tablet Take 5 mg by mouth daily      Calcium Carbonate-Vit D-Min (CALTRATE 600+D PLUS PO) Take by mouth daily      levothyroxine (SYNTHROID) 100 MCG tablet TAKE 1 TABLET BY MOUTH ONCE DAILY      Lifitegrast (XIIDRA) 5 % SOLN Place 1 drop into both eyes 2 times daily      zoledronic acid (RECLAST) 5 MG/100ML SOLN Infuse 5 mg intravenously once Yearly      Multiple Vitamins-Minerals (CENTRUM ADULTS PO) Take by mouth daily      Carboxymethylcellul-Glycerin (REFRESH RELIEVA OP) Apply to eye as needed       No current facility-administered medications for this visit. Review of Systems:   Review of Systems   Constitutional:  Positive for fatigue. Negative for diaphoresis. HENT: Negative. Eyes: Negative. Respiratory: Negative. Negative for cough, chest tightness, shortness of breath, wheezing and stridor. Cardiovascular: Negative. Negative for chest pain, palpitations and leg swelling. Gastrointestinal: Negative. Negative for blood in stool and nausea. Genitourinary: Negative. Musculoskeletal:  Positive for arthralgias and gait problem. Skin: Negative. Neurological:  Positive for weakness. Negative for dizziness, syncope and light-headedness. Hematological: Negative. Psychiatric/Behavioral: Negative. Physical Examination:    /76 (Site: Left Upper Arm, Position: Sitting, Cuff Size: Child)    Physical Exam   Constitutional: She appears cachectic. No distress. HENT:   Normal cephalic and Atraumatic   Eyes: Pupils are equal, round, and reactive to light. Neck: Thyroid normal. No JVD present. No neck adenopathy. No thyromegaly present. Cardiovascular: Normal rate, regular rhythm, normal heart sounds, intact distal pulses and normal pulses. Pulmonary/Chest: Effort normal and breath sounds normal. She has no wheezes. She has no rales. She exhibits no tenderness. Abdominal: Soft. Bowel sounds are normal. There is no abdominal tenderness. Musculoskeletal:         General: No tenderness or edema. Normal range of motion. Cervical back: Normal range of motion and neck supple. Neurological: She is alert and oriented to person, place, and time. Skin: Skin is warm. No cyanosis. Nails show no clubbing.      LABS:  CBC:   Lab Results   Component Value Date/Time    WBC 7.4 01/06/2023 02:45 PM    RBC 4.16 01/06/2023 02:45 PM    HGB 11.7 01/06/2023 02:45 PM    HCT 36.4 01/06/2023 02:45 PM    MCV 87.6 01/06/2023 02:45 PM    MCH 28.1 01/06/2023 02:45 PM    MCHC 32.1 01/06/2023 02:45 PM    RDW 15.9 01/06/2023 02:45 PM     01/06/2023 02:45 PM     Lipids:No results found for: CHOL  No results found for: TRIG  No results found for: HDL  No results found for: LDLCHOLESTEROL, LDLCALC  No results found for: LABVLDL, VLDL  No results found for: CHOLHDLRATIO  CMP:    Lab Results   Component Value Date/Time     01/06/2023 02:45 PM    K 4.7 01/06/2023 02:45 PM    K 2.9 10/02/2021 06:35 AM    CL 97 01/06/2023 02:45 PM    CO2 27 01/06/2023 02:45 PM    BUN 7 01/06/2023 02:45 PM    CREATININE 0.32 01/06/2023 02:45 PM    GFRAA >60.0 07/08/2022 12:29 PM    LABGLOM >60.0 01/06/2023 02:45 PM    GLUCOSE 97 01/06/2023 02:45 PM    PROT 6.9 01/06/2023 02:45 PM    LABALBU 3.4 01/06/2023 02:45 PM    CALCIUM 9.1 01/06/2023 02:45 PM    BILITOT 0.4 01/06/2023 02:45 PM    ALKPHOS 80 01/06/2023 02:45 PM    AST 20 01/06/2023 02:45 PM    ALT 7 01/06/2023 02:45 PM     BMP:    Lab Results   Component Value Date/Time     01/06/2023 02:45 PM    K 4.7 01/06/2023 02:45 PM    K 2.9 10/02/2021 06:35 AM    CL 97 01/06/2023 02:45 PM    CO2 27 01/06/2023 02:45 PM    BUN 7 01/06/2023 02:45 PM    LABALBU 3.4 01/06/2023 02:45 PM    CREATININE 0.32 01/06/2023 02:45 PM    CALCIUM 9.1 01/06/2023 02:45 PM    GFRAA >60.0 07/08/2022 12:29 PM    LABGLOM >60.0 01/06/2023 02:45 PM    GLUCOSE 97 01/06/2023 02:45 PM     Magnesium:    Lab Results   Component Value Date/Time    MG 2.0 01/06/2023 04:13 PM     TSH:  Lab Results   Component Value Date    TSH 2.810 12/03/2021             Patient Active Problem List   Diagnosis    Age-related osteoporosis without current pathological fracture    Anemia, unspecified    Hypothyroidism, unspecified    Rheu arthritis w rheu factor [facility] w/o org/sys involv ([facility])    Chronic pain    Dry eye syndrome, bilateral    Secondary Sjogren's syndrome ([facility])    Steroid long-term use    Colitis    Generalized muscle ache    Long-term current use of opiate analgesic    Acquired deformity joint foot    Acquired deformity of hand    Long term (current) use of systemic steroids    Muscle weakness (generalized)    Abnormality of gait    Dysphagia    Fibromyalgia, secondary    Palliative care encounter    Depression    Chronic congestive heart failure ([facility])    Edema    Neuropathy    Contracture of joint of multiple sites    Intestinal malabsorption, unspecified    Keratitis sicca ([facility])    RA (rheumatoid arthritis) ([facility])    Nausea    Disturbance of sleep pattern associated with pain    Spasticity    Underweight    Neuropathic pain    Myelopathy concurrent with and due to stenosis of lumbar spine ([facility])    Paraparesis of both lower limbs ([address]    Acquired spondylolisthesis of lumbosacral region       There are no discontinued medications. Modified Medications    No medications on file       No orders of the defined types were placed in this encounter. Assessment/Plan:    1. Acute congestive heart failure, unspecified heart failure type ([address]  Compensated. 2. Essential hypertension     - Echo 2D w doppler w color complete; Future - stable. 3. Need to eat more. 4. Dizzy low BP - add Low dose Midodrine. 5. Preop Lumbar surgery- she is severely debilitated. She is at hig risk of perioperative complications. Discussed in detail w pt and . Pt wants to proceed w surgery. Should she proceed she will need Melody SPECT. Counseling:  Heart Healthy Lifestyle, Improve BMI, Low Salt Diet, Increase Nutritional Intake and Walk Daily    Return in about 3 months (around 5/7/2023).     Electronically signed by Williams Doe MD on 2/7/2023 at 3:39 PM

## 2023-02-08 DIAGNOSIS — Z51.5 ENCOUNTER FOR PALLIATIVE CARE: ICD-10-CM

## 2023-02-08 DIAGNOSIS — F41.9 ANXIETY: ICD-10-CM

## 2023-02-08 DIAGNOSIS — R25.1 TREMORS OF NERVOUS SYSTEM: ICD-10-CM

## 2023-02-08 DIAGNOSIS — K21.9 GASTROESOPHAGEAL REFLUX DISEASE, UNSPECIFIED WHETHER ESOPHAGITIS PRESENT: ICD-10-CM

## 2023-02-08 RX ORDER — PANTOPRAZOLE SODIUM 40 MG/1
40 TABLET, DELAYED RELEASE ORAL
Qty: 30 TABLET | Refills: 11 | OUTPATIENT
Start: 2023-02-08

## 2023-02-08 RX ORDER — PANTOPRAZOLE SODIUM 40 MG/1
40 TABLET, DELAYED RELEASE ORAL
Qty: 30 TABLET | Refills: 11 | Status: SHIPPED | OUTPATIENT
Start: 2023-02-08

## 2023-02-08 RX ORDER — DIAZEPAM 2 MG/1
2 TABLET ORAL 2 TIMES DAILY PRN
Qty: 60 TABLET | Refills: 0 | Status: SHIPPED | OUTPATIENT
Start: 2023-02-12 | End: 2023-03-14

## 2023-02-13 ENCOUNTER — HOSPITAL ENCOUNTER (OUTPATIENT)
Dept: NUCLEAR MEDICINE | Age: 68
Discharge: HOME | End: 2023-02-15
Payer: MEDICARE

## 2023-02-13 ENCOUNTER — HOSPITAL ENCOUNTER (OUTPATIENT)
Dept: NON INVASIVE DIAGNOSTICS | Age: 68
Discharge: HOME | End: 2023-02-13
Payer: MEDICARE

## 2023-02-13 DIAGNOSIS — Z01.810 PREOP CARDIOVASCULAR EXAM: ICD-10-CM

## 2023-02-13 PROCEDURE — 78452 HT MUSCLE IMAGE SPECT MULT: CPT

## 2023-02-13 PROCEDURE — 2580000003 HC RX 258: Performed by: INTERNAL MEDICINE

## 2023-02-13 PROCEDURE — A9502 TC99M TETROFOSMIN: HCPCS | Performed by: INTERNAL MEDICINE

## 2023-02-13 PROCEDURE — 6360000002 HC RX W HCPCS: Performed by: INTERNAL MEDICINE

## 2023-02-13 PROCEDURE — 93017 CV STRESS TEST TRACING ONLY: CPT

## 2023-02-13 PROCEDURE — 3430000000 HC RX DIAGNOSTIC RADIOPHARMACEUTICAL: Performed by: INTERNAL MEDICINE

## 2023-02-13 RX ORDER — SODIUM CHLORIDE 0.9 % (FLUSH) 0.9 %
10 SYRINGE (ML) INJECTION PRN
Status: COMPLETED | OUTPATIENT
Start: 2023-02-13 | End: 2023-02-13

## 2023-02-13 RX ADMIN — TETROFOSMIN 35.1 MILLICURIE: 1.38 INJECTION, POWDER, LYOPHILIZED, FOR SOLUTION INTRAVENOUS at 11:36

## 2023-02-13 RX ADMIN — TETROFOSMIN 11.7 MILLICURIE: 1.38 INJECTION, POWDER, LYOPHILIZED, FOR SOLUTION INTRAVENOUS at 10:25

## 2023-02-13 RX ADMIN — REGADENOSON 0.4 MG: 0.08 INJECTION, SOLUTION INTRAVENOUS at 11:35

## 2023-02-13 RX ADMIN — Medication 10 ML: at 11:35

## 2023-02-13 RX ADMIN — Medication 10 ML: at 11:36

## 2023-02-13 RX ADMIN — Medication 10 ML: at 10:25

## 2023-02-13 NOTE — PROGRESS NOTES
Reviewed history, allergies, and medications. Patient took her home medications prior to testing. Consent confirmed. Lexiscan exam explained. Placed patient on monitor. @ [place] here to inject Leonmegan Oseguera. SOB noted during recovery phase. Denied chest pain. No ectopy noted. Doctor to further review and interpret results. Patient off monitor and instructed to eat, will have last part of exam in 1 hour.

## 2023-02-14 ENCOUNTER — TELEPHONE (OUTPATIENT)
Dept: PALLATIVE CARE | Age: 68
End: 2023-02-14

## 2023-02-14 NOTE — TELEPHONE ENCOUNTER
Discussed care with spouse. Valilum is helping, just doesn't last the full 12 hours for patient's tremors. Gave okay for patient to take the valium 2 mg po TID prn tremoring.

## 2023-02-14 NOTE — TELEPHONE ENCOUNTER
Pt  calling in requesting if they can up the Valium 3 tablets a day to see if the dose increase will give her the relief that she needs.

## 2023-02-15 ENCOUNTER — TELEPHONE (OUTPATIENT)
Dept: CARDIOLOGY CLINIC | Age: 68
End: 2023-02-15

## 2023-02-24 ENCOUNTER — OFFICE VISIT (OUTPATIENT)
Dept: PALLATIVE CARE | Age: 68
End: 2023-02-24
Payer: MEDICARE

## 2023-02-24 VITALS
TEMPERATURE: 98.3 F | HEART RATE: 83 BPM | OXYGEN SATURATION: 94 % | SYSTOLIC BLOOD PRESSURE: 97 MMHG | DIASTOLIC BLOOD PRESSURE: 63 MMHG

## 2023-02-24 DIAGNOSIS — Z51.5 PALLIATIVE CARE ENCOUNTER: ICD-10-CM

## 2023-02-24 DIAGNOSIS — I50.9 CHRONIC CONGESTIVE HEART FAILURE, UNSPECIFIED HEART FAILURE TYPE (HCC): ICD-10-CM

## 2023-02-24 DIAGNOSIS — R25.2 SPASTICITY: ICD-10-CM

## 2023-02-24 DIAGNOSIS — R25.1 TREMORS OF NERVOUS SYSTEM: ICD-10-CM

## 2023-02-24 DIAGNOSIS — M81.0 AGE-RELATED OSTEOPOROSIS WITHOUT CURRENT PATHOLOGICAL FRACTURE: ICD-10-CM

## 2023-02-24 DIAGNOSIS — G99.2 MYELOPATHY CONCURRENT WITH AND DUE TO STENOSIS OF LUMBAR SPINE (HCC): ICD-10-CM

## 2023-02-24 DIAGNOSIS — Z78.9 IMPAIRED MOBILITY AND ADLS: Primary | ICD-10-CM

## 2023-02-24 DIAGNOSIS — M24.50 CONTRACTURE OF JOINT OF MULTIPLE SITES: ICD-10-CM

## 2023-02-24 DIAGNOSIS — M79.7 FIBROMYALGIA, SECONDARY: ICD-10-CM

## 2023-02-24 DIAGNOSIS — M48.061 MYELOPATHY CONCURRENT WITH AND DUE TO STENOSIS OF LUMBAR SPINE (HCC): ICD-10-CM

## 2023-02-24 DIAGNOSIS — M79.2 NEUROPATHIC PAIN: ICD-10-CM

## 2023-02-24 DIAGNOSIS — G89.29 OTHER CHRONIC PAIN: ICD-10-CM

## 2023-02-24 DIAGNOSIS — M43.17 ACQUIRED SPONDYLOLISTHESIS OF LUMBOSACRAL REGION: ICD-10-CM

## 2023-02-24 DIAGNOSIS — G82.20 PARAPARESIS OF BOTH LOWER LIMBS (HCC): ICD-10-CM

## 2023-02-24 DIAGNOSIS — Z74.09 IMPAIRED MOBILITY AND ADLS: Primary | ICD-10-CM

## 2023-02-24 DIAGNOSIS — R60.9 EDEMA, UNSPECIFIED TYPE: ICD-10-CM

## 2023-02-24 DIAGNOSIS — M05.79 RHEU ARTHRITIS W RHEU FACTOR MULT SITE W/O ORG/SYS INVOLV (HCC): ICD-10-CM

## 2023-02-24 PROBLEM — F32.0 CURRENT MILD EPISODE OF MAJOR DEPRESSIVE DISORDER WITHOUT PRIOR EPISODE (HCC): Status: ACTIVE | Noted: 2023-02-24

## 2023-02-24 PROBLEM — E46 PROTEIN CALORIE MALNUTRITION (HCC): Status: ACTIVE | Noted: 2023-02-24

## 2023-02-24 PROCEDURE — G8419 CALC BMI OUT NRM PARAM NOF/U: HCPCS | Performed by: NURSE PRACTITIONER

## 2023-02-24 PROCEDURE — 1123F ACP DISCUSS/DSCN MKR DOCD: CPT | Performed by: NURSE PRACTITIONER

## 2023-02-24 PROCEDURE — G8484 FLU IMMUNIZE NO ADMIN: HCPCS | Performed by: NURSE PRACTITIONER

## 2023-02-24 PROCEDURE — 3017F COLORECTAL CA SCREEN DOC REV: CPT | Performed by: NURSE PRACTITIONER

## 2023-02-24 PROCEDURE — 1090F PRES/ABSN URINE INCON ASSESS: CPT | Performed by: NURSE PRACTITIONER

## 2023-02-24 PROCEDURE — 1036F TOBACCO NON-USER: CPT | Performed by: NURSE PRACTITIONER

## 2023-02-24 PROCEDURE — 99349 HOME/RES VST EST MOD MDM 40: CPT | Performed by: NURSE PRACTITIONER

## 2023-02-24 RX ORDER — OXYCODONE HYDROCHLORIDE 15 MG/1
15 TABLET ORAL EVERY 4 HOURS PRN
Qty: 180 TABLET | Refills: 0 | Status: SHIPPED | OUTPATIENT
Start: 2023-02-24 | End: 2023-03-26

## 2023-02-24 ASSESSMENT — ENCOUNTER SYMPTOMS
BACK PAIN: 1
COUGH: 0
DIARRHEA: 0
WHEEZING: 0
TROUBLE SWALLOWING: 1
CONSTIPATION: 0
GASTROINTESTINAL NEGATIVE: 1
SHORTNESS OF BREATH: 0
ABDOMINAL PAIN: 0
COLOR CHANGE: 0
ALLERGIC/IMMUNOLOGIC NEGATIVE: 1
NAUSEA: 0

## 2023-02-24 NOTE — PROGRESS NOTES
Subjective:      Patient Id: Loretta Menomonie at home in [place] for follow-up palliative medicine visit. She was accompanied to the appointment by: Spouse. Chief Complaint   Patient presents with    Tremors    Other     Inability to ambulate- ongoing, scheduling back surgery           HPI       Lisa Espino is a 75 y.o. female seen for symptom management. Jadyn Hector has complex medical history that includes fibromyagia, OA, CHF, and End stage RA with multiple contractures. General: Patient is A+Ox4 and in NAD. Functional Status: Baseline functional status is up walking with rollator, weak gait. However, she has not been able to walk since December due to a complete collapse in her lumbar spine interverbal disc. Patient saw Dr. Amanda Peacock who referred patient to a tertiary care center. Now following Dr. Valdez July. Got cardiac clearance. Next step is scheduling procedure. Therapy has been coming and working with patient. She would like OT to help work on her upper body strength as well as a home health aide. Would like to know about resources for at home post surgery if she were to need more help at home. Pain: Pain has remained an ongoing issue but is tolerable on current regimen. She does have intermittent pain exacerbations. Patient with history of chronic pain due to severe RA, fibromyalgia, and neuropathy. Pain occurs in joints throughout body. Has multiple contractures and degeneration of joints. Also has muscular pain. Currently rates the pain at a 4-5/10 and describes pain as a \"vise-like and pressure\" and \"pushing everything inward\". Currently taking oxycodone 15 mg po 1 tab every 4 hrs PRN and Lyrica 60 mg p.o. 4 times daily. Denies Sedation, constipation, or other adverse effects on current regime. Pt follows with Dr. Precious Marte at [facility] for rheumatology. CHF: Denies SOB. Edema at baseline. Some leg swelling in left leg. No coughing or wheezing. Follows with cardiology Dr. Alban Vaughn.  On Midodrine 2.5 mg PO TID for hypotension. Tremoring: Continues to be an ongoing issue for patient. Recently increased valium frequency due it not last long enough. Taking 2 mg po TID prn. This has helped improve the tremors. Has appointment Monday at 12:30 with Dr. Freda Ron from .       Past Medical History:   Diagnosis Date    CHF (congestive heart failure) (HCC)     Fibromyalgia     Fracture of pelvis (HCC)     left    History of left hip replacement     History of total left knee replacement     History of total right knee replacement     Light sensitivity     bilateral eye    Osteoarthritis 1977    Osteoporosis     Rheumatoid arthritis ([facility] [address]     Sjogrens syndrome ([facility] [address]      Past Surgical History:   Procedure Laterality Date    CATARACT REMOVAL WITH IMPLANT  01/2013    bilateral    CHOLECYSTECTOMY      EYE SURGERY      JOINT REPLACEMENT      OTHER SURGICAL HISTORY      bilateral eye implant    OVARY REMOVAL  06/2000    right    REVISION TOTAL HIP ARTHROPLASTY  10/2012    left    TOTAL HIP ARTHROPLASTY  04/1990    left    TOTAL KNEE ARTHROPLASTY  08/1997    right and left     Social History     Socioeconomic History    Marital status:      Spouse name: Not on file    Number of children: Not on file    Years of education: Not on file    Highest education level: Not on file   Occupational History    Not on file   Tobacco Use    Smoking status: Never    Smokeless tobacco: Never   Vaping Use    Vaping Use: Never used   Substance and Sexual Activity    Alcohol use: Never    Drug use: Never    Sexual activity: Not Currently   Other Topics Concern    Not on file   Social History Narrative    Not on file     Social Determinants of Health     Financial Resource Strain: Low Risk     Difficulty of Paying Living Expenses: Not hard at all   Food Insecurity: No Food Insecurity    Worried About Running Out of Food in the Last Year: Never true    Ran Out of Food in the Last Year: Never true   Transportation Needs: Not on file   Physical Activity: Inactive    Days of Exercise per Week: 0 days    Minutes of Exercise per Session: 0 min   Stress: Not on file   Social Connections: Not on file   Intimate Partner Violence: Not on file   Housing Stability: Not on file     Family History   Problem Relation Age of Onset    Arthritis Mother      Allergies   Allergen Reactions    Hydrocodone-Acetaminophen Other (See Comments)     Dizzy, Nausea. Hydroxychloroquine Other (See Comments)     Nausea, stomach upset    Indomethacin Other (See Comments)     Dizziness, nausea, rash and vomiting    Amoxicillin Diarrhea and Nausea And Vomiting    Lactose Nausea And Vomiting    Other      \"Cheap metals\"  rash     Current Outpatient Medications on File Prior to Visit   Medication Sig Dispense Refill    acetaminophen (TYLENOL) 500 MG tablet Take 250 mg by mouth every 4 hours as needed      diazePAM (VALIUM) 2 MG tablet Take 1 tablet by mouth 2 times daily as needed for Anxiety or Sleep for up to 30 days. Max Daily Amount: 4 mg (Patient taking differently: Take 2 mg by mouth 3 times daily as needed for Anxiety or Sleep.) 60 tablet 0    pantoprazole (PROTONIX) 40 MG tablet Take 1 tablet by mouth every morning (before breakfast) 30 tablet 11    pregabalin (LYRICA) 20 MG/ML SOLN solution Take 3 mLs by mouth in the morning, at noon, in the evening, and at bedtime for 90 days. Max Daily Amount: 240 mg 360 mL 2    ondansetron (ZOFRAN-ODT) 4 MG disintegrating tablet Take 1 tablet by mouth every 8 hours as needed for Nausea or Vomiting 30 tablet 3    naloxone 4 MG/0.1ML LIQD nasal spray ADMINISTER A SINGLE SPRAY IN ONE NOSTRIL UPON SIGNS OF OPIOID OVERDOSE. CALL 911.  REPEAT AFTER 3 MINUTES IF NO RESPONSE.      midodrine (PROAMATINE) 2.5 MG tablet Take 1 tablet by mouth 3 times daily 270 tablet 3    metoclopramide (REGLAN) 10 MG tablet Take 0.5 tablets by mouth 3 times daily (with meals) 270 tablet 3    furosemide (LASIX) 20 MG tablet Take 0.5 tablets by mouth daily as needed (edema) 90 tablet 3    etanercept (ENBREL) 50 MG/ML injection Inject into the skin once a week Takes monday      folic acid (FOLVITE) 1 MG tablet TAKE 1 TABLET BY MOUTH ONCE DAILY      predniSONE (DELTASONE) 5 MG tablet Take 5 mg by mouth daily      Calcium Carbonate-Vit D-Min (CALTRATE 600+D PLUS PO) Take by mouth daily      levothyroxine (SYNTHROID) 100 MCG tablet TAKE 1 TABLET BY MOUTH ONCE DAILY      Lifitegrast (XIIDRA) 5 % SOLN Place 1 drop into both eyes 2 times daily      zoledronic acid (RECLAST) 5 MG/100ML SOLN Infuse 5 mg intravenously once Yearly      Multiple Vitamins-Minerals (CENTRUM ADULTS PO) Take by mouth daily      Carboxymethylcellul-Glycerin (REFRESH RELIEVA OP) Apply to eye as needed       No current facility-administered medications on file prior to visit. Review of Systems   Constitutional:  Negative for activity change, appetite change and unexpected weight change. HENT:  Positive for trouble swallowing (none if she eats very slow). Respiratory:  Negative for cough, shortness of breath and wheezing. Cardiovascular:  Negative for chest pain and leg swelling. Gastrointestinal: Negative. Negative for abdominal pain, constipation, diarrhea and nausea. Endocrine: Negative. Genitourinary: Negative. Musculoskeletal:  Positive for arthralgias, back pain, joint swelling and myalgias. Skin: Negative. Negative for color change and rash. Allergic/Immunologic: Negative. Neurological:  Positive for weakness (ongoing). Negative for dizziness, tremors and syncope. Hematological: Negative. Psychiatric/Behavioral:  Negative for confusion. The patient is nervous/anxious (due to upcoming procedure). Objective:   BP 97/63   Pulse 83   Temp 98.3 °F (36.8 °C)   SpO2 94%    Wt Readings from Last 3 Encounters:   01/31/23 88 lb (39.9 kg)   01/06/23 88 lb (39.9 kg)   12/20/22 85 lb (38.6 kg)     Physical Exam  Constitutional:       General: She is not in acute distress. Appearance: She is underweight. HENT:      Head: Normocephalic and atraumatic. Nose: No rhinorrhea. Eyes:      General: No scleral icterus. Right eye: No discharge. Left eye: No discharge. Extraocular Movements: Extraocular movements intact. Conjunctiva/sclera: Conjunctivae normal.   Cardiovascular:      Rate and Rhythm: Normal rate and regular rhythm. Pulmonary:      Effort: Pulmonary effort is normal.      Breath sounds: Decreased breath sounds present. No wheezing or rales. Abdominal:      General: Bowel sounds are normal. There is no distension. Palpations: Abdomen is soft. Tenderness: There is no abdominal tenderness. Musculoskeletal:         General: Deformity (significant hand deformity) present. Cervical back: Neck supple. Right lower leg: No edema. Left lower leg: Edema (+2 left ankle) present. Skin:     General: Skin is warm and dry. Neurological:      Mental Status: She is alert and oriented to person, place, and time. Motor: Weakness present. Psychiatric:         Mood and Affect: Mood normal.         Behavior: Behavior normal.         Thought Content: Thought content normal.         Judgment: Judgment normal.       Assessment and Plan:      1. Age-related osteoporosis without current pathological fracture  2. Rheu arthritis w rheu factor mult site w/o org/sys involv (HCC)  Significant debility due to severe RA. Continue to follow with rheumatology. 3. Contracture of joint of multiple sites  4. Other chronic pain  5. Fibromyalgia  6. Neuropathic pain  7. Spasticity  Pain is controlled on current regimen of oxycodone 15 mg po q 4 hours prn and lyrica 60 mg po QID. Pt has tried patches in the past, does not tolerate. Unable to prescribe extended release medications due to patient having to crush medications in setting of dysphagia. Pt is tolerating current pain meds without adverse effects or over sedation.  Lowest effective dose used. Pt advised to call and notify palliative care for any adverse effects or sedation  Pt is able to maintain adequate functional level and participate in ADLs  OARRS reviewed. There is no indication of aberrant behavior  Pt advised to call for increasing or uncontrolled pain. Risk vs benefit assessed. Pt advised to take only as prescribed and not to change frequency of pain meds without consulting palliative care first.   Tran Cerda is at pt house if needed. Pt spouse is very involved in pt care and health status. 8. Impaired mobility and ADLs  9. Acquired spondylolisthesis of lumbosacral region  10. 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 Paraparesis of both lower limbs ([address]  Patient to have surgery scheduled through [facility]. She has not been able to ambulate in several months. Will consult OT for upper body strengthening. Patient would also like [address] aide. Discussed rehab post surgery. Patient and spouse would like to know about resources in the home should patient need more assistance with ADLs post surgery. Will have appointment set up with social work. 12. Edema, unspecified type  13. Chronic congestive heart failure, unspecified heart failure type ([address]  Some edema in left lower extremity. Stable. -Continue diuretic as prescribed  -Daily weights, call if you gain 3 lbs in one day or 5 lbs. in one week, or for increased edema, sob, cough, orthopnea, or chest pain    -Maintain follow up with cardiology- Dr. To Lewis    14. Tremors of nervous system  Stable on valium 2 mg po TID prn. Patient to see neurology on Monday. Call the office after appointment to review recommendations. 15. Palliative care encounter  Discussed symptom management related to chronic disease/condition. Provided emotional support and active listening. Patient understands and is agreeable to current plan.           Due to acuity, symptomatology and high-risk medication management, I advised patient to Return in about 2 months (around 4/24/2023). -Patient having scheduled spine surgery and will also be in rehab after.     MDM: ROBIN Barakat - CNP    Collaborating physician: Dr. Steve Olivo

## 2023-02-26 PROBLEM — M53.2X9: Status: ACTIVE | Noted: 2023-02-26

## 2023-02-26 PROBLEM — M41.9 SCOLIOSIS: Status: ACTIVE | Noted: 2023-02-26

## 2023-02-26 PROBLEM — M43.9 ACQUIRED SPINAL DEFORMITY: Status: ACTIVE | Noted: 2023-02-26

## 2023-02-26 PROBLEM — Z74.09 IMPAIRED MOBILITY AND ADLS: Status: ACTIVE | Noted: 2023-02-26

## 2023-02-26 PROBLEM — G95.9 CERVICAL MYELOPATHY (HCC): Status: ACTIVE | Noted: 2023-02-26

## 2023-02-26 PROBLEM — R25.1 TREMORS OF NERVOUS SYSTEM: Status: ACTIVE | Noted: 2023-02-26

## 2023-02-26 PROBLEM — Z78.9 IMPAIRED MOBILITY AND ADLS: Status: ACTIVE | Noted: 2023-02-26

## 2023-02-27 ENCOUNTER — TELEPHONE (OUTPATIENT)
Dept: PALLATIVE CARE | Age: 68
End: 2023-02-27

## 2023-02-27 DIAGNOSIS — F41.9 ANXIETY: ICD-10-CM

## 2023-02-27 DIAGNOSIS — R25.1 TREMORS OF NERVOUS SYSTEM: ICD-10-CM

## 2023-02-27 RX ORDER — DIAZEPAM 2 MG/1
2 TABLET ORAL 3 TIMES DAILY PRN
Qty: 90 TABLET | Refills: 0 | Status: SHIPPED | OUTPATIENT
Start: 2023-02-27 | End: 2023-03-29

## 2023-02-27 NOTE — TELEPHONE ENCOUNTER
Nisa Agrawal calling in just to have on record that the pharmacy only had 140 tablets of the patients oxycodone and they will eventually need a partial script called in when she is low on her meds.

## 2023-02-27 NOTE — TELEPHONE ENCOUNTER
Care discussed with Angelina Rosenthal. Patient saw neurology. They feel she is experiencing myoclonus due to her spinal cord issues. They feel the surgery is urgent, and this needs to be addressed first. They recommend magnesium and continuing with valium at this time. Patient is still able to lift legs. She does not have any bowel or bladder incontinence or saddle anesthesia. However, weakness is progressing. Recommended ER as to not delay intervention further.

## 2023-03-07 ENCOUNTER — OFFICE VISIT (OUTPATIENT)
Dept: PALLATIVE CARE | Age: 68
End: 2023-03-07

## 2023-03-07 DIAGNOSIS — F41.9 ANXIETY: Primary | ICD-10-CM

## 2023-03-07 NOTE — Clinical Note
Patient and spouse are requesting HHA and possible OT, prior to her surgery on 4/15/23. (currently receiving PT). I will contact [address] today.

## 2023-03-08 NOTE — PROGRESS NOTES
.            Psychosocial Assessment  SHIN King, RENETTA  3/8/2023     Maria L Coral   1955       Time spent with Patient: 60 minutes  [facility]. Reason for Consult:  New Patient Assessment  Referring Provider: Leonardo Coello MD    Pt (and/or legal guardian) provided informed consent for the behavioral health program. Discussed with patient model of service to include the limits of confidentiality (i.e. abuse reporting, suicide intervention, etc.) and short-term intervention focused approach. Pt (and/or legal guardian) indicated understanding. Feedback given to PCP. Summary:  Pt is a 66y/o female who lives with her  in their home in [facility]. Pt was observed in her bedroom, presents as cooperative, anxious and A/Ox4. , Dasia Galvez asked if SW could help patient to  receive HHA, and OT. Dasia Galvez is PCG, he prepares food, medication, and assists patient with ADLs, toileting and transfers. He expressed having some support, from his children occasionally. He states he has to be involved with pt's total care due to her frailty. Pt will be having surgery on her spine on April 15, 2023 at [facility] in [facility]. SW gave directions as Dasia Galvez had never been to [facility]. Josiane Christensen was a recommendation that she go to [facility] ,we still don't know if it will be an outpatient or if she'll be staying for a few days post surgery due to her condition. \"  Dasia Galvez is desiring that pt rehab at home once discharged. He is not interested in a Nursing Home setting for rehab, and explained that it would be difficult to take her to an outpatient rehab facility. SHAYLEE recommended Dasia Galvez be in contact with SW after pt's surgery, and gave contact information. SW explained that the d/c plan will be made at [facility] and for him to have this conversation with pt's physician. He voice understanding. SW will contact Home Health to see if pt is eligible for HHA and OT.   SW recommended Dasia Galvez consider taking some time for himself and asking for help  to allow for self-care. SW educated on warning signs of Caregiver burnout. Tania Luc[employer] delivered meals, volunteer services. MSE:  Appearance    alert, cooperative, moderate distress  Appetite poor  Sleep disturbance No  Fatigue Yes  Loss of pleasure Yes  Impulsive behavior No  Speech    slow  Mood    Anxious, Despair  Affect    anxiety  Thought Content    hopelessness, helplessness, and excessive guilt  Thought Process    coherent  Associations    logical connections  Insight    Fair  Judgment    Intact  Orientation    oriented to person, place, time, and general circumstances  Memory    recent and remote memory intact  Attention/Concentration    intact  Morbid ideation No  Suicide Assessment    no suicidal ideation      History:    Medications:   Current Outpatient Medications   Medication Sig Dispense Refill    diazePAM (VALIUM) 2 MG tablet Take 1 tablet by mouth 3 times daily as needed (myoclonus) for up to 30 days. Max Daily Amount: 6 mg 90 tablet 0    oxyCODONE (OXY-IR) 15 MG immediate release tablet Take 1 tablet by mouth every 4 hours as needed for Pain (moderate to severe pain) for up to 30 days. Max Daily Amount: 90 mg 180 tablet 0    pantoprazole (PROTONIX) 40 MG tablet Take 1 tablet by mouth every morning (before breakfast) 30 tablet 11    acetaminophen (TYLENOL) 500 MG tablet Take 250 mg by mouth every 4 hours as needed      pregabalin (LYRICA) 20 MG/ML SOLN solution Take 3 mLs by mouth in the morning, at noon, in the evening, and at bedtime for 90 days. Max Daily Amount: 240 mg 360 mL 2    ondansetron (ZOFRAN-ODT) 4 MG disintegrating tablet Take 1 tablet by mouth every 8 hours as needed for Nausea or Vomiting 30 tablet 3    naloxone 4 MG/0.1ML LIQD nasal spray ADMINISTER A SINGLE SPRAY IN ONE NOSTRIL UPON SIGNS OF OPIOID OVERDOSE. CALL 911.  REPEAT AFTER 3 MINUTES IF NO RESPONSE.      midodrine (PROAMATINE) 2.5 MG tablet Take 1 tablet by mouth 3 times daily 270 tablet 3    metoclopramide (REGLAN) 10 MG tablet Take 0.5 tablets by mouth 3 times daily (with meals) 270 tablet 3    furosemide (LASIX) 20 MG tablet Take 0.5 tablets by mouth daily as needed (edema) 90 tablet 3    etanercept (ENBREL) 50 MG/ML injection Inject into the skin once a week Takes monday      folic acid (FOLVITE) 1 MG tablet TAKE 1 TABLET BY MOUTH ONCE DAILY      predniSONE (DELTASONE) 5 MG tablet Take 5 mg by mouth daily      Calcium Carbonate-Vit D-Min (CALTRATE 600+D PLUS PO) Take by mouth daily      levothyroxine (SYNTHROID) 100 MCG tablet TAKE 1 TABLET BY MOUTH ONCE DAILY      Lifitegrast (XIIDRA) 5 % SOLN Place 1 drop into both eyes 2 times daily      zoledronic acid (RECLAST) 5 MG/100ML SOLN Infuse 5 mg intravenously once Yearly      Multiple Vitamins-Minerals (CENTRUM ADULTS PO) Take by mouth daily      Carboxymethylcellul-Glycerin (REFRESH RELIEVA OP) Apply to eye as needed       No current facility-administered medications for this visit.        Social History:   Social History     Socioeconomic History    Marital status:      Spouse name: Not on file    Number of children: Not on file    Years of education: Not on file    Highest education level: Not on file   Occupational History    Not on file   Tobacco Use    Smoking status: Never    Smokeless tobacco: Never   Vaping Use    Vaping Use: Never used   Substance and Sexual Activity    Alcohol use: Never    Drug use: Never    Sexual activity: Not Currently   Other Topics Concern    Not on file   Social History Narrative    Not on file     Social Determinants of Health     Financial Resource Strain: Low Risk     Difficulty of Paying Living Expenses: Not hard at all   Food Insecurity: No Food Insecurity    Worried About Running Out of Food in the Last Year: Never true    [address] in the Last Year: Never true   Transportation Needs: Not on file   Physical Activity: Inactive    Days of Exercise per Week: 0 days Minutes of Exercise per Session: 0 min   Stress: Not on file   Social Connections: Not on file   Intimate Partner Violence: Not on file   Housing Stability: Not on file       TOBACCO:   reports that she has never smoked. She has never used smokeless tobacco.  ETOH:   reports no history of alcohol use. Family History:   Family History   Problem Relation Age of Onset    Arthritis Mother      Diagnosis:    Generalized anxiety disorder      Diagnosis Date    CHF (congestive heart failure) (HCC)     Fibromyalgia     Fracture of pelvis (HCC)     left    History of left hip replacement     History of total left knee replacement     History of total right knee replacement     Light sensitivity     bilateral eye    Osteoarthritis 1977    Osteoporosis     Rheumatoid arthritis ([facility] [address]     Sjogrens syndrome ([facility] [address]    Plan:  Pt and spouse request HHA, OT assistance. Pt interventions:  Established rapport and Conducted functional assessment      Marisa MCINTOSH/Brandie Jasso      Please note this report has been partially produced using speech recognition software  And may cause contain errors related to that system including grammar, punctuation and spelling as well as words and phrases that may seem inappropriate. If there are questions or concerns please feel free to contact me to clarify.

## 2023-03-10 ENCOUNTER — TELEPHONE (OUTPATIENT)
Dept: PALLATIVE CARE | Age: 68
End: 2023-03-10

## 2023-03-10 NOTE — TELEPHONE ENCOUNTER
Pt  LM stating that Edward Alvarenga has developed a bed sore on her Left Hip, he would like to discuss plan of care for the bed sore.

## 2023-03-10 NOTE — TELEPHONE ENCOUNTER
Based on spouse's discription after further discussion, wound is likely at a stage II. Recommended cleansing with mild soap and water or saline and applying a silicone border dressing. Change dressing every 72 hours. Recommended using one on right hip as well to pad and protect as this is showing some redness. Wounds at this time do not appear to be infected. Spouse will call with s/s of infection. Turn and reposition frequently.

## 2023-03-17 DIAGNOSIS — M24.50 CONTRACTURE OF JOINT OF MULTIPLE SITES: ICD-10-CM

## 2023-03-17 DIAGNOSIS — M79.2 NEUROPATHIC PAIN: ICD-10-CM

## 2023-03-17 DIAGNOSIS — G89.29 OTHER CHRONIC PAIN: ICD-10-CM

## 2023-03-17 DIAGNOSIS — M05.79 RHEU ARTHRITIS W RHEU FACTOR MULT SITE W/O ORG/SYS INVOLV (HCC): ICD-10-CM

## 2023-03-17 RX ORDER — OXYCODONE HYDROCHLORIDE 15 MG/1
15 TABLET ORAL EVERY 4 HOURS PRN
Qty: 180 TABLET | Refills: 0 | Status: SHIPPED | OUTPATIENT
Start: 2023-03-21 | End: 2023-04-20

## 2023-03-31 DIAGNOSIS — R25.1 TREMORS OF NERVOUS SYSTEM: ICD-10-CM

## 2023-03-31 DIAGNOSIS — F41.9 ANXIETY: ICD-10-CM

## 2023-03-31 RX ORDER — DIAZEPAM 2 MG/1
2 TABLET ORAL 3 TIMES DAILY PRN
Qty: 90 TABLET | Refills: 0 | Status: SHIPPED | OUTPATIENT
Start: 2023-04-04 | End: 2023-05-04

## 2023-04-17 ENCOUNTER — TELEPHONE (OUTPATIENT)
Dept: PALLATIVE CARE | Age: 68
End: 2023-04-17

## 2023-04-17 NOTE — TELEPHONE ENCOUNTER
Lorie Peterson- patient's spouse Brittnee Seals called in asking me to get a message to you. He is hoping you will call him at your earliest convenience. He wants to update you on Brandie's current status, an operation she was supposed to have did not happen, and she has developed new symptoms. He wants to discuss this information with you personally. You have some time tomorrow I can make a VV maybe or if you are just going to call him, let me know. Thank you!

## 2023-04-18 NOTE — TELEPHONE ENCOUNTER
Called Kishan last night. Patient did not end up having surgery as her and family met with the surgical team and decided the risks outweighed the benefits. Patient does not want PT/OT anymore as it is becoming too much for her to have people in and out of the house at different times. Patient is having worsening tremors. Sees neurology on Thursday. Will call spouse Thursday to see what neurology suggests.

## 2023-04-20 NOTE — TELEPHONE ENCOUNTER
Per spouse, Dr. Robert Shaffer wants to switch from valium to clonazepam. Will re-evaluate at upcoming appointment.

## 2023-04-26 ENCOUNTER — OFFICE VISIT (OUTPATIENT)
Dept: PALLATIVE CARE | Age: 68
End: 2023-04-26

## 2023-04-26 VITALS
SYSTOLIC BLOOD PRESSURE: 97 MMHG | DIASTOLIC BLOOD PRESSURE: 62 MMHG | HEART RATE: 90 BPM | OXYGEN SATURATION: 92 % | TEMPERATURE: 98.1 F

## 2023-04-26 DIAGNOSIS — M79.7 FIBROMYALGIA, SECONDARY: ICD-10-CM

## 2023-04-26 DIAGNOSIS — M79.2 NEUROPATHIC PAIN: ICD-10-CM

## 2023-04-26 DIAGNOSIS — R53.1 GENERALIZED WEAKNESS: ICD-10-CM

## 2023-04-26 DIAGNOSIS — M43.17 ACQUIRED SPONDYLOLISTHESIS OF LUMBOSACRAL REGION: ICD-10-CM

## 2023-04-26 DIAGNOSIS — Z78.9 IMPAIRED MOBILITY AND ADLS: ICD-10-CM

## 2023-04-26 DIAGNOSIS — R60.9 EDEMA, UNSPECIFIED TYPE: ICD-10-CM

## 2023-04-26 DIAGNOSIS — R41.0 CONFUSION: ICD-10-CM

## 2023-04-26 DIAGNOSIS — R63.0 ANOREXIA: ICD-10-CM

## 2023-04-26 DIAGNOSIS — G25.3 MYOCLONUS: ICD-10-CM

## 2023-04-26 DIAGNOSIS — G99.2 MYELOPATHY CONCURRENT WITH AND DUE TO STENOSIS OF LUMBAR SPINE (HCC): ICD-10-CM

## 2023-04-26 DIAGNOSIS — Z74.09 IMPAIRED MOBILITY AND ADLS: ICD-10-CM

## 2023-04-26 DIAGNOSIS — M48.061 MYELOPATHY CONCURRENT WITH AND DUE TO STENOSIS OF LUMBAR SPINE (HCC): ICD-10-CM

## 2023-04-26 DIAGNOSIS — M24.50 CONTRACTURE OF JOINT OF MULTIPLE SITES: ICD-10-CM

## 2023-04-26 DIAGNOSIS — M81.0 AGE-RELATED OSTEOPOROSIS WITHOUT CURRENT PATHOLOGICAL FRACTURE: ICD-10-CM

## 2023-04-26 DIAGNOSIS — Z71.89 GOALS OF CARE, COUNSELING/DISCUSSION: ICD-10-CM

## 2023-04-26 DIAGNOSIS — M05.79 RHEU ARTHRITIS W RHEU FACTOR MULT SITE W/O ORG/SYS INVOLV (HCC): ICD-10-CM

## 2023-04-26 DIAGNOSIS — Z51.5 PALLIATIVE CARE ENCOUNTER: ICD-10-CM

## 2023-04-26 DIAGNOSIS — G89.29 OTHER CHRONIC PAIN: ICD-10-CM

## 2023-04-26 DIAGNOSIS — I50.9 CHRONIC CONGESTIVE HEART FAILURE, UNSPECIFIED HEART FAILURE TYPE (HCC): ICD-10-CM

## 2023-04-26 DIAGNOSIS — Z71.89 ADVANCED CARE PLANNING/COUNSELING DISCUSSION: ICD-10-CM

## 2023-04-26 DIAGNOSIS — R53.83 FATIGUE, UNSPECIFIED TYPE: ICD-10-CM

## 2023-04-26 DIAGNOSIS — G82.20 PARAPARESIS OF BOTH LOWER LIMBS (HCC): ICD-10-CM

## 2023-04-26 DIAGNOSIS — R53.83 LETHARGY: Primary | ICD-10-CM

## 2023-04-26 DIAGNOSIS — R25.2 SPASTICITY: ICD-10-CM

## 2023-04-26 DIAGNOSIS — E43 SEVERE PROTEIN-CALORIE MALNUTRITION (HCC): ICD-10-CM

## 2023-04-26 RX ORDER — MULTIVITAMIN WITH IRON
125 TABLET ORAL DAILY
COMMUNITY

## 2023-04-26 RX ORDER — CLONAZEPAM 0.5 MG/1
0.25 TABLET ORAL 2 TIMES DAILY
COMMUNITY
Start: 2023-04-21

## 2023-04-26 ASSESSMENT — ENCOUNTER SYMPTOMS
COLOR CHANGE: 0
BACK PAIN: 1
ALLERGIC/IMMUNOLOGIC NEGATIVE: 1
ABDOMINAL PAIN: 0
COUGH: 0
SHORTNESS OF BREATH: 0
CONSTIPATION: 0
DIARRHEA: 0
GASTROINTESTINAL NEGATIVE: 1
WHEEZING: 0
NAUSEA: 0

## 2023-04-26 NOTE — PROGRESS NOTES
Subjective:      Patient Id: Manuel Boyer at home in [place] for follow-up palliative medicine visit. She was accompanied to the appointment by: Spouse. Chief Complaint   Patient presents with    Fatigue    Extremity Weakness    Anorexia           HPI       Jun Noriega is a 75 y.o. female seen for symptom management. Chelita Shaw has complex medical history that includes fibromyagia, OA, CHF, and End stage RA with multiple contractures. General: Patient is alert to person, place, and month. Disoriented to year. Patient is lethargic and lying in bed. Cachetic-appearing. EOMs not intact. Has some confusion. Patient has been very weak, fatigued, and very poor oral intake over the last few days. Patient also having issues with dysphagia. Functional Status: Baseline functional status is up walking with rollator, weak gait. However, she has not been able to walk since December due to a complete collapse in her lumbar spine interverbal disc. Patient saw Dr. Tessie Mak who referred patient to a tertiary care center. Followed up with Dr. Abril Moncada. After meeting with neurosurgery and Dr. Abril Moncada, patient decided against surgery due to her high risk and medical complexity. Pain: Patient reports pain has been alright. Patient with history of chronic pain due to severe RA, fibromyalgia, and neuropathy. Pain occurs in joints throughout body. Has multiple contractures and degeneration of joints. Also has muscular pain. Describes pain as a \"vise-like and pressure\" and \"pushing everything inward\". Currently taking oxycodone 15 mg po 1 tab every 4 hrs PRN and Lyrica 60 mg p.o. 4 times daily. Pt follows with Dr. Glendy Schwarz at [facility] for [facility]ology. CHF: Denies SOB. No current edema. No coughing or wheezing. Follows with cardiology Dr. Magaly Lamas. On Midodrine 2.5 mg PO TID for hypotension. Spouse requesting virtual visit May 9th instead of in-person. Tremoring: Continues to be an ongoing issue for patient.  Recently changed

## 2023-04-27 ENCOUNTER — TELEPHONE (OUTPATIENT)
Dept: PALLATIVE CARE | Age: 68
End: 2023-04-27

## 2023-04-27 NOTE — TELEPHONE ENCOUNTER
Spoke with Claritza Ballesteros. Recommended eliminated a couple pills such as multiple vitamin and folic acid. No other changes in patient's condition/concerns at this time. He reports she did well with the crushed medications in applesauce.

## 2023-04-27 NOTE — TELEPHONE ENCOUNTER
I called Juan Antonio De La Cruz to see how Sulema Parra is doing today. He said no \"real change\" compared to yesterday. He believes her breathing is a bit \"better\". She got up this morning and ate a small breakfast. She has been drinking water and taking her medications. She is sleeping a lot and when she wakes up she is in pain. Afebrile, BP on left side was 94/68. He is questioning if any of her other meds can be changed to liquid form or if he should be concentrating on certain meds because it is getting hard for her to swallow (even the crushed pills).

## 2023-04-28 PROBLEM — I11.0 HYPERTENSIVE HEART DISEASE WITH HEART FAILURE (HCC): Status: ACTIVE | Noted: 2023-03-28

## 2023-04-30 PROBLEM — G25.3 MYOCLONUS: Status: ACTIVE | Noted: 2023-04-30

## 2023-04-30 PROBLEM — F32.0 CURRENT MILD EPISODE OF MAJOR DEPRESSIVE DISORDER WITHOUT PRIOR EPISODE (HCC): Status: RESOLVED | Noted: 2023-02-24 | Resolved: 2023-04-30

## 2023-04-30 ASSESSMENT — ENCOUNTER SYMPTOMS: TROUBLE SWALLOWING: 1

## 2023-05-03 ENCOUNTER — TELEPHONE (OUTPATIENT)
Dept: PALLATIVE CARE | Age: 68
End: 2023-05-03

## 2023-05-03 RX ORDER — PREGABALIN 20 MG/ML
80 SOLUTION ORAL 4 TIMES DAILY
COMMUNITY

## 2023-05-03 NOTE — TELEPHONE ENCOUNTER
Had conversation with Luis Eduardo Salazar. He feels patient would like a [facility] as she does not want to go to the ER. However, she is currently sleeping. He will call me back when she wakes up, so I can discuss this with her as well.

## 2023-05-03 NOTE — TELEPHONE ENCOUNTER
Dat Fritz called in to [facility], and placed it on speaker phone for me to hear as well. He explains that Yael Guzman is not doing well. She is having extreme 10/10 BLE pain, it \"hurts to touch\". She is now bedridden and unable to get up. He is wondering if maybe her pain meds could be adjusted, \"add something and or subtract something\". Her sleep patterns are also an issue- he explains that she is up all night and sleeps all day.  He is asking if this is normal.

## 2023-05-04 ENCOUNTER — TELEPHONE (OUTPATIENT)
Dept: CARDIOLOGY CLINIC | Age: 68
End: 2023-05-04

## 2023-05-04 NOTE — TELEPHONE ENCOUNTER
Appointment canceled for Kenyatta Harmon ([id number])   Visit Type: [facility] SPECIAL USE CASE   Date        Time      Length    Provider                  Department   5/9/2023     3:15 PM  15 mins. MD Sean Ling 93      Reason for Cancellation: Other     Appointment Scheduled  (Newest Message First)  Kenyatta Harmon  Patient Appointment Cancel Request Pool 49 minutes ago (1:31 PM)       Appointment canceled for Kenyatta Harmon ([id number])  Visit Type: [facility] SPECIAL USE CASE  Date        Time      Length    Provider                  Department  5/9/2023     3:15 PM  15 mins. MD Sean Ling 93     Reason for Cancellation: Other       Batch Job User Marie  Ruiz Lana 2 months ago     MychartZain 68 2 months ago     PM  Appointment Information:      Visit Type: Office Visit          Date: 5/9/2023                  Dept: [facility]                  Provider: Alba ALEXANDRA                  Time: 3:15 PM                  Length: 15 min     Appt Status: Scheduled          This [employer] message has not been read.

## 2023-05-04 NOTE — TELEPHONE ENCOUNTER
Spouse and patient called on speaker phone. Discussed code status and patient wishes. Patient wants to be a [facility] and have comfort measures only. She understands that further adjustments in her medication regimen have the potential to cause respiratory depression and death, however, her goal is to remain comfortable at this point in time. She wishes to remain out of the hospital and discontinue aggressive treatment measures. She wishes to continue comfort care with palliative care and not sign with hospice at this time. Patient has been having severe pain in her legs. She is now completely bedbound. She has had some urinary incontinence. Will increase lyrica to 4 mL (80 mg) po QID. Please call office on Friday to report how medication adjustment is working or if pain worsens.

## 2023-05-05 ENCOUNTER — TELEPHONE (OUTPATIENT)
Dept: MEDSURG UNIT | Age: 68
End: 2023-05-05

## 2023-05-05 DIAGNOSIS — G99.2 MYELOPATHY CONCURRENT WITH AND DUE TO STENOSIS OF LUMBAR SPINE (HCC): ICD-10-CM

## 2023-05-05 DIAGNOSIS — M24.50 CONTRACTURE OF JOINT OF MULTIPLE SITES: ICD-10-CM

## 2023-05-05 DIAGNOSIS — Z66 DNR (DO NOT RESUSCITATE): Primary | ICD-10-CM

## 2023-05-05 DIAGNOSIS — R06.03 RESPIRATORY DISTRESS: ICD-10-CM

## 2023-05-05 DIAGNOSIS — M48.061 MYELOPATHY CONCURRENT WITH AND DUE TO STENOSIS OF LUMBAR SPINE (HCC): ICD-10-CM

## 2023-05-05 DIAGNOSIS — Z51.5 PALLIATIVE CARE ENCOUNTER: ICD-10-CM

## 2023-05-05 DIAGNOSIS — M05.79 RHEUMATOID ARTHRITIS INVOLVING MULTIPLE SITES WITH POSITIVE RHEUMATOID FACTOR (HCC): ICD-10-CM

## 2023-05-05 DIAGNOSIS — M79.2 NEUROPATHIC PAIN: ICD-10-CM

## 2023-05-05 RX ORDER — LORAZEPAM 2 MG/ML
0.5 CONCENTRATE ORAL EVERY 4 HOURS PRN
Qty: 11 ML | Refills: 0 | Status: SHIPPED | OUTPATIENT
Start: 2023-05-05 | End: 2023-05-05

## 2023-05-05 RX ORDER — OXYCODONE HCL 20 MG/ML
15 CONCENTRATE, ORAL ORAL EVERY 4 HOURS PRN
Qty: 32 ML | Refills: 0 | Status: SHIPPED | OUTPATIENT
Start: 2023-05-05 | End: 2023-05-12

## 2023-05-05 RX ORDER — LORAZEPAM 2 MG/ML
0.5 CONCENTRATE ORAL EVERY 4 HOURS PRN
Qty: 11 ML | Refills: 0 | Status: SHIPPED | OUTPATIENT
Start: 2023-05-05 | End: 2023-05-12

## 2023-05-05 RX ORDER — OXYCODONE HCL 20 MG/ML
15 CONCENTRATE, ORAL ORAL EVERY 4 HOURS PRN
Qty: 32 ML | Refills: 0 | Status: SHIPPED | OUTPATIENT
Start: 2023-05-05 | End: 2023-05-05

## 2023-05-05 NOTE — TELEPHONE ENCOUNTER
Spouse called reporting another change in patient's condition and asking for guidance related to comfort measures. Wicho Pate stopped eating and drinking. She has been refusing to take any medications. She is incontinent at all times and lethargic. She is breathing heavily and rapidly. We discussed hospice care again. Spouse is comfortable taking care of Wicho Pate on his own and continuing with comfort care measures through palliative care. Advised spouse that Wicho Pate is likely in the active phases of dying. Discussed having family come see her. Discussed limited life expectancy at this point. Stop all meds except the following: Oxycodone 15 mg sublingual q 4 hours prn and ativan 0.5 mg sublingual every 4 hours.

## 2023-05-06 DIAGNOSIS — Z66 DNR (DO NOT RESUSCITATE): ICD-10-CM

## 2023-05-06 DIAGNOSIS — R09.89 TERMINAL RESPIRATORY SECRETIONS: Primary | ICD-10-CM

## 2023-05-06 RX ORDER — HYOSCYAMINE SULFATE 0.125 MG
125 TABLET ORAL EVERY 4 HOURS PRN
Qty: 28 TABLET | Refills: 1 | Status: SHIPPED | OUTPATIENT
Start: 2023-05-06

## 2023-05-06 NOTE — PROGRESS NOTES
Patient's spouse called reporting audible respiratory secretions. Will start levsin 125 mcg sublingual q 4 hours prn.